# Patient Record
Sex: FEMALE | Employment: UNEMPLOYED | URBAN - METROPOLITAN AREA
[De-identification: names, ages, dates, MRNs, and addresses within clinical notes are randomized per-mention and may not be internally consistent; named-entity substitution may affect disease eponyms.]

---

## 2017-02-06 ENCOUNTER — PRE VISIT (OUTPATIENT)
Dept: MATERNAL FETAL MEDICINE | Facility: CLINIC | Age: 37
End: 2017-02-06

## 2017-02-08 ENCOUNTER — DOCUMENTATION ONLY (OUTPATIENT)
Dept: MATERNAL FETAL MEDICINE | Facility: CLINIC | Age: 37
End: 2017-02-08

## 2017-02-08 ENCOUNTER — OFFICE VISIT (OUTPATIENT)
Dept: MATERNAL FETAL MEDICINE | Facility: CLINIC | Age: 37
End: 2017-02-08
Attending: OBSTETRICS & GYNECOLOGY
Payer: COMMERCIAL

## 2017-02-08 DIAGNOSIS — Z31.69 ENCOUNTER FOR PRECONCEPTION CONSULTATION: ICD-10-CM

## 2017-02-08 NOTE — NURSING NOTE
Pt presents to [facility] for preconception consult due to PPROM previable delivery of twins, GDMA2, AMA. Pt very tearful today. Here with partner. States she is wanting to get pregnant again soon. Met with Dr. Nuñez and Dr. Ruelas. See consult note in epic for today's recommendations and discussion. Questions answered. No further follow up at [facility] at this time. Robinson stable. Janine Xiong RN

## 2017-02-08 NOTE — PROGRESS NOTES
Dear Dr. Quintero,    Thank you for your request for maternal fetal medicine consult for Ms. Major for a history of previable PPROM.     HPI:  Sylvain Major is a 36 year old  with a history of di di twin gestation and previable PPROM at 19w2d with subsequent expectant management until 21w5d. Her pregnancy was also notable for T2DM and AMA. When she initially present at 19w2d, her cervix was noted to be closed with anhydramnios of twin A. She elected for expectant management of PPROM, and was discharged home and at 21w5d began to have vaginal bleeding and pressure. She was admitted with a WBC of 18.2 and vaginal bleeding consistent with possible abruption. She progressed spontaneously to 8cm. She had an uncomplicated  at 21w5d of twin A. She desired interval and expectant management of twin B. Approximately 24 hours later, she experienced contractions, vaginal bleeding and twin B presented with a bulging bag of water. She then spontaneously delivered twin B. Both placentas delivered spontaneously without complication. Autopsy was not performed, but limited external examination of fetuses revealed no anomalies and weight/foot length consistent with gestational age. Placental pathology revealed chorioamnionitis and possible abruption.    Regarding her history of T2DM, she was diagnosed in 2015 while undergoing IVF stimulation and on glucocorticoids. Her A1C at that time was 6.7, meeting criteria for DM2 diagnosis. However, the patient denies a formal diagnosis of DM2 and has never been treated or formally followed up for this. A1C was repeated in pregnancy and was . Her last A1C was 6.3. She currently takes no diabetes medication. She denies any history of renal or cardiovascular disease but has not been screened.    Obstetric History       T0      TAB0   SAB0   E0   M0   L0       # Outcome Date GA Lbr Jose/2nd Weight Sex Delivery Anes PTL Lv   1  16 21w6d 05:13  / 00:24 0.4 kg (14.1 oz) M Vag-Bree[facility] IV [facility] Y ND      Name: FRANCYBABYPatricia JASMINE      Apgar1:  2                Apgar5: 1        GynHx:  - Denies h/o STI  - Denies abnormal pap smear or cervical procedure history    Patient Active Problem List   Diagnosis     CARDIOVASCULAR SCREENING; LDL GOAL LESS THAN 160      premature rupture of membranes (PPROM) delivered, current hospitalization     Insulin controlled gestational diabetes mellitus (GDM) in second trimester     Type 2 diabetes mellitus affecting pregnancy, antepartum     Dichorionic diamniotic twin gestation      premature rupture of membranes (PPROM) with unknown onset of labor     Oligohydramnios antepartum, second trimester, fetus 1     PROM (premature rupture of membranes)     No past surgical history on file.    Meds:  Prenatal vitamins    Allergies:   No Known Allergies    PFH:  - Noncontributory    SocHx:  - Denies tobacco, etoh and illicit drug use    Assessment and Plan:  Sylvain Major is a 36 year old  here for preconception counseling with history of PPROM at 19w2d. We discussed that the patient s history is not consistent with cervical insufficiency. She had no cervical dilation,  labor, or bulging membranes that would be consistent with cervical insufficiency at the time of presentation. At this time it is reasonable to consider close cervical length monitoring with serial transvaginal ultrasounds. We discussed that if the cervix did begin to shorten then an indicated cerclage could be placed. While vaginal progesterone and 17 OHP injections and prophylactic cerclage have not been studied in PPROM prior to 20 weeks, there is likely some element of  labor behind these events, and it would be reasonable to initiate 17 Hydroxyprogesterone Caproate injections at 16 weeks and weekly until 37 weeks. We reviewed that use of 17-OHP has been shown to reduce risk of recurrent delivery by 30  percent.    The patient did have questions regarding precautions, and many questions regarding what may have caused the PPROM and  delivery. We discussed that since there is no etiology to explain the 19w2d week PPROM other than twin gestation and possible chorioamnionitis, there are no specific precautions such as lifting restrictions or pelvic rest she should follow. The patient is pursuing single embryo transfer. We discussed that single embryo transfer does lower her risk of multiple gestation pregnancy, thereby reducing her risks of recurrent PPROM,  delivery, and other multiple pregnancy complications including hypertensive disorders, hemorrhage, hyperemesis, and  delivery. We do not suspect the presence of a subchorionic hematoma caused this. The patient inquired about the use of aspirin. It would be reasonable to initiate aspirin after 6 weeks gestation and continue 81 mg daily throughout pregnancy. Placental pathology revealed possible abruption and acute chorioamnionitis, however the infection and abruption most likely occurred secondary to the PPROM and were not likely the cause of these events.    We also discussed with the patient that pregestational diabetes mellitus is associated with an increased risk of miscarriage, stillbirth, macrosomia, and birth injury.  We discussed that a higher rate of birth defects is also present, and that the risk increases with increasing blood glucose. We discussed strict glucose control and frequent blood glucose monitoring. We discussed target glucose levels during pregnancy including fasting <90, 1 hour postprandial <140 and 2 hour postprandial <120. Due to the increased risk of birth defects, particularly cardiac defects, we recommend a level 2 ultrasound at 18-20 weeks and a fetal echocardiogram at 20-22 weeks. To evaluate growth, we recommend a comprehensive growth ultrasound every four weeks thereafter. We discussed that for mothers with type  2 diabetes, an increased risk for preeclampsia, gestational hypertension, and  delivery exist. We discussed the need for baseline laboratory evaluation including CBC with platelets, creatinine, and urine protein to creatinine ratio.  We would also recommend an ophthalmologic evaluation annually.  We would also recommend weekly  testing starting at 32 weeks gestation. She voices understanding and will obtain testing for diabetes prior to pursuing IVF stimulation and transfer.    Recommendations for pregnancy:  1) Serial transvaginal ultrasounds: 16w limited anatomy scan and transvaginal, 18w transvaginal, 20w comprehensive anatomy ultrasound and transvaginal ultrasound.   2) Optimize nutrition, consult with nutritionist recommended prior to conception and in early pregnancy  3) Recommend annual ophthalmologic examination to evaluate for presence of retinal vascular disease.  4) Obtain a baseline EKG in early pregnancy  5) Optimize blood glucose control prior to pregnancy. Establish and continue close follow up with endocrinology.   6) Baseline creatinine and urine protein:creatine ratio in early pregnancy  7) Imaging: Level two comprehensive ultrasound at 18-20 weeks, fetal echocardiography at 20-22 weeks, serial growth ultrasounds every 4 weeks  8)  surveillance with weekly BPP s starting at 32 weeks  9) 17-OHP injections starting at 16 weeks and continuing weekly until term  10) Reasonable to take 81 mg ASA daily after 6 weeks gestation     Thank you for the opportunity to participate in the care of this patient. If you have questions regarding today's evaluation or if we can be of further service, please contact the Maternal Fetal Medicine Clinic. Approximate face to face time was 45 minutes. The majority of time was spent on counseling and coordination of care of this patient.    VICENTEATTEMEKA    Ms. Major is a pleasant 36 year old female was seen today  in  consultation for   Patient Active Problem List   Diagnosis     CARDIOVASCULAR SCREENING; LDL GOAL LESS THAN 160      premature rupture of membranes (PPROM) delivered, current hospitalization     Insulin controlled gestational diabetes mellitus (GDM) in second trimester     Type 2 diabetes mellitus affecting pregnancy, antepartum     Dichorionic diamniotic twin gestation      premature rupture of membranes (PPROM) with unknown onset of labor     Oligohydramnios antepartum, second trimester, fetus 1     PROM (premature rupture of membranes)   .  I have reviewed the note and discussed the case with the resident.  I have personallyreviewed patient records and labs.  I agree with discussion and plan of care.    Chavez Nuñez M.D.

## 2017-03-17 ENCOUNTER — CARE COORDINATION (OUTPATIENT)
Dept: CASE MANAGEMENT | Facility: CLINIC | Age: 37
End: 2017-03-17

## 2017-03-24 ENCOUNTER — CARE COORDINATION (OUTPATIENT)
Dept: CASE MANAGEMENT | Facility: CLINIC | Age: 37
End: 2017-03-24

## 2017-08-01 ENCOUNTER — TRANSFERRED RECORDS (OUTPATIENT)
Dept: HEALTH INFORMATION MANAGEMENT | Facility: CLINIC | Age: 37
End: 2017-08-01

## 2017-08-01 LAB — PAP SMEAR - HIM PATIENT REPORTED: NEGATIVE

## 2017-09-13 ENCOUNTER — TRANSFERRED RECORDS (OUTPATIENT)
Dept: HEALTH INFORMATION MANAGEMENT | Facility: CLINIC | Age: 37
End: 2017-09-13

## 2017-09-13 LAB
ALT SERPL-CCNC: 59 IU/L (ref 12–68)
AST SERPL-CCNC: 28 IU/L (ref 12–37)
CHOLEST SERPL-MCNC: 260 MG/DL
CREAT SERPL-MCNC: 0.57 MG/DL (ref 0.55–1.02)
GFR SERPL CREATININE-BSD FRML MDRD: >60 ML/MIN/1.73M2
GLUCOSE SERPL-MCNC: 203 MG/DL (ref 74–106)
HBA1C MFR BLD: 9.1 % (ref 0–5.7)
HDLC SERPL-MCNC: 51 MG/DL
LDLC SERPL CALC-MCNC: 174 MG/DL
POTASSIUM SERPL-SCNC: 4 MMOL/L (ref 3.5–5.1)
TRIGL SERPL-MCNC: 177 MG/DL

## 2017-09-14 ENCOUNTER — TRANSFERRED RECORDS (OUTPATIENT)
Dept: HEALTH INFORMATION MANAGEMENT | Facility: CLINIC | Age: 37
End: 2017-09-14

## 2017-10-17 ENCOUNTER — OFFICE VISIT (OUTPATIENT)
Dept: FAMILY MEDICINE | Facility: CLINIC | Age: 37
End: 2017-10-17
Payer: COMMERCIAL

## 2017-10-17 VITALS
DIASTOLIC BLOOD PRESSURE: 82 MMHG | HEIGHT: 64 IN | BODY MASS INDEX: 25.1 KG/M2 | WEIGHT: 147 LBS | SYSTOLIC BLOOD PRESSURE: 125 MMHG | OXYGEN SATURATION: 98 % | TEMPERATURE: 97.7 F | HEART RATE: 48 BPM

## 2017-10-17 DIAGNOSIS — E78.5 HYPERLIPIDEMIA LDL GOAL <70: ICD-10-CM

## 2017-10-17 DIAGNOSIS — E11.9 TYPE 2 DIABETES MELLITUS WITHOUT COMPLICATION, WITHOUT LONG-TERM CURRENT USE OF INSULIN (H): Primary | ICD-10-CM

## 2017-10-17 DIAGNOSIS — R07.89 ATYPICAL CHEST PAIN: ICD-10-CM

## 2017-10-17 PROCEDURE — 99214 OFFICE O/P EST MOD 30 MIN: CPT | Performed by: INTERNAL MEDICINE

## 2017-10-17 PROCEDURE — 93000 ELECTROCARDIOGRAM COMPLETE: CPT | Performed by: INTERNAL MEDICINE

## 2017-10-17 RX ORDER — ATORVASTATIN CALCIUM 40 MG/1
40 TABLET, FILM COATED ORAL DAILY
Qty: 90 TABLET | Refills: 1 | Status: SHIPPED | OUTPATIENT
Start: 2017-10-17 | End: 2017-12-19

## 2017-10-17 RX ORDER — PRENATAL VIT/IRON FUM/FOLIC AC 27MG-0.8MG
1 TABLET ORAL DAILY
COMMUNITY
End: 2019-09-10

## 2017-10-17 NOTE — PROGRESS NOTES
SUBJECTIVE:   Sylvain Major is a 37 year old female who presents to clinic today for the following health issues:      Concern -  lab results   Onset: 2017      Description:   [facility] was sent to primary to go over results     Intensity:     Progression of Symptoms:      Accompanying Signs & Symptoms:      Previous history of similar problem:       Precipitating factors:   Worsened by:     Alleviating factors:  Improved by:     Therapies Tried and outcome:     Sylvain is a 38 y/o female with history of  premature rupture of membranes resulting in loss of dizygotic twins at 21 weeks gestation. She was found to have gestational diabetes and there was some concern that she may have had pregestational diabetes. She just had labs done recently which confirmed that she does have Diabetes - her A1c was 9.1%.     Concerned also about some chest pain she had in the last few days. Only lasted a few seconds and was substernal. Not related to exercise.     Had an eye exam within the last 12 months.     Problem list and histories reviewed & adjusted, as indicated.  Additional history: as documented    Patient Active Problem List   Diagnosis     CARDIOVASCULAR SCREENING; LDL GOAL LESS THAN 160      premature rupture of membranes (PPROM) delivered, current hospitalization     Insulin controlled gestational diabetes mellitus (GDM) in second trimester     Type 2 diabetes mellitus affecting pregnancy, antepartum     Dichorionic diamniotic twin gestation      premature rupture of membranes (PPROM) with unknown onset of labor     Oligohydramnios antepartum, second trimester, fetus 1     PROM (premature rupture of membranes)     No past surgical history on file.    Social History   Substance Use Topics     Smoking status: Never Smoker     Smokeless tobacco: Never Used     Alcohol use 0.0 oz/week     0 Standard drinks or equivalent per week      Comment: occ     Family  "History   Problem Relation Age of Onset     HEART DISEASE Mother      DIABETES Father      DIABETES Paternal Grandmother      DIABETES Paternal Grandfather              Reviewed and updated as needed this visit by clinical staff     Reviewed and updated as needed this visit by Provider         ROS:  Constitutional, HEENT, cardiovascular, pulmonary, gi and gu systems are negative, except as otherwise noted.      OBJECTIVE:   /82 (BP Location: Right arm, Cuff Size: Adult Regular)  Pulse (!) 48  Temp 97.7  F (36.5  C) (Oral)  Ht 5' 4\" (1.626 m)  Wt 147 lb (66.7 kg)  LMP 2017  SpO2 98%  BMI 25.23 kg/m2  Body mass index is 25.23 kg/(m^2).  GENERAL: healthy, alert and no distress  NECK: no adenopathy, no asymmetry, masses, or scars and thyroid normal to palpation  RESP: lungs clear to auscultation - no rales, rhonchi or wheezes  CV: regular rate and rhythm, normal S1 S2, no S3 or S4, no murmur, click or rub, no peripheral edema and peripheral pulses strong  PSYCH: mentation appears normal, affect normal/bright    Diagnostic Test Results:  ECG: Normal sinus rhythm, no ST changes    Review of outside labs:  Hemoglobin A1c 9.1%  TC: 260  LDL: 174  HDL: 51  T  ASSESSMENT/PLAN:     1. Type 2 diabetes mellitus without complication, without long-term current use of insulin (H)  38 y/o female with history of gestational diabetes now with Type 2 Diabetes. She is hoping to undergo in vitro fertilization in the future. Will plan to start her on Metformin and Lipitor today (though Lipitor would need to be discontinued prior to attempts at conception). Also recommending aspirin daily 81 mg.   - metFORMIN (GLUCOPHAGE) 500 MG tablet; Start taking 500 mg twice daily and after 2 weeks increase to 1000 mg twice daily.  Dispense: 120 tablet; Refill: 1  - DIABETES EDUCATOR REFERRAL  - atorvastatin (LIPITOR) 40 MG tablet; Take 1 tablet (40 mg) by mouth daily  Dispense: 90 tablet; Refill: 1  - aspirin 81 MG EC tablet; " Take 1 tablet (81 mg) by mouth daily  Dispense: 90 tablet; Refill: 3    2. Hyperlipidemia LDL goal <70  - atorvastatin (LIPITOR) 40 MG tablet; Take 1 tablet (40 mg) by mouth daily  Dispense: 90 tablet; Refill: 1    3. Atypical chest pain  Negative ECG. Unlikely cardiac.   - EKG 12-lead complete w/read - Clinics    Follow up in 2 month(s) or sooner if needed      Janine Barrett MD  [facility]

## 2017-10-17 NOTE — MR AVS SNAPSHOT
After Visit Summary   10/17/2017    Sylvain Major    MRN: [id number]           Patient Information     Date Of Birth          1980        Visit Information        Provider Department      10/17/2017 3:00 PM Janine Barrett MD [facility] [place]        Today's Diagnoses     Type 2 diabetes mellitus without complication, without long-term current use of insulin (H)    -  1    Hyperlipidemia LDL goal <70        Atypical chest pain           Follow-ups after your visit        Additional Services     DIABETES EDUCATOR REFERRAL       DIABETES SELF MANAGEMENT TRAINING (DSMT)      Your provider has referred you to Diabetes Education: FMG: Diabetes Education - All [facility] [phone]   [email or web address]     If an urgent visit is needed or A1C is above 12, Care Team to call the Diabetes  Education Team at [phone] or send an In Basket message to the Diabetes Education Pool (P DIAB ED-PATIENT CARE).    A  will call you to make your appointment. If it has been more than 3 business days since your referral was placed, please call the above phone number to schedule.    Type of training and number of hours: Previous Diagnosis: Follow-up DSMT - 2 hours.    Medicare covers: 10 hours of initial DSMT in 12 month period from the time of first visit, plus 2 hours of follow-up DSMT annually, and additional hours as requested for insulin training.    Diabetes Type: Type 2 - On Oral Medication             Diabetes Co-Morbidities: none               A1C Goal:  <7.0       A1C is: Lab Results       Component                Value               Date                       A1C                      6.3                 10/17/2016              Diabetes Education Topics: Comprehensive Knowledge Assessment and Instruction    Special Educational Needs Requiring Individual DSMT: None       MEDICAL NUTRITION THERAPY (MNT) for  Diabetes    Medical Nutrition Therapy with a Registered Dietitian can be provided in coordination with Diabetes Self-Management Training to assist in achieving optimal diabetes management.    MNT Type and Hours: Previous diagnosis: Annual follow-up MNT - 2 hours                       Medicare will cover: 3 hours initial MNT in 12 month period after first visit, plus 2 hours of follow-up MNT annually    Please be aware that coverage of these services is subject to the terms and limitations of your health insurance plan.  Call member services at your health plan to determine Diabetes Self-Management Training (Codes  &amp; ) and Medical Nutrition Therapy (Codes 22220 & 53218) benefits and ask which blood glucose monitor brands are covered by your plan.  Please bring the following with you to your appointment:    (1)  List of current medications   (2)  List of Blood Glucose Monitor brands that are covered by your insurance plan  (3)  Blood Glucose Monitor and log book  (4)   Food records for the 3 days prior to your visit    The Certified Diabetes Educator may make diabetes medication adjustments per the CDE Protocol and Collaborative Practice Agreement.                  Who to contact     If you have questions or need follow up information about today's clinic visit or your schedule please contact [facility] [place] directly at [phone].  Normal or non-critical lab and imaging results will be communicated to you by MyChart, letter or phone within 4 business days after the clinic has received the results. If you do not hear from us within 7 days, please contact the clinic through Instant[employer] or phone. If you have a critical or abnormal lab result, we will notify you by phone as soon as possible.  Submit refill requests through [employer] or call your pharmacy and they will forward the refill request to us. Please allow 3 business days for your refill to be completed.          Additional Information  "About Your Visit        [facility] Information     happin! lets you send messages to your doctor, view your test results, renew your prescriptions, schedule appointments and more. To sign up, go to [email or web address] . Click on \"Log in\" on the left side of the screen, which will take you to the Welcome page. Then click on \"Sign up Now\" on the right side of the page.     You will be asked to enter the access code listed below, as well as some personal information. Please follow the directions to create your username and password.     Your access code is: [email or web address]  Expires: 2018  9:52 AM     Your access code will  in 90 days. If you need help or a new code, please call your [facility] clinic or [phone].        Care EveryWhere ID     This is your Care EveryWhere ID. This could be used by other organizations to access your [facility] medical records  [id number]        Your Vitals Were     Pulse Temperature Height Last Period Pulse Oximetry BMI (Body Mass Index)    48 97.7  F (36.5  C) (Oral) 5' 4\" (1.626 m) 2017 98% 25.23 kg/m2       Blood Pressure from Last 3 Encounters:   10/17/17 125/82   16 97/61   10/25/16 110/68    Weight from Last 3 Encounters:   10/17/17 147 lb (66.7 kg)   10/25/16 153 lb (69.4 kg)   10/17/16 152 lb (68.9 kg)              We Performed the Following     DIABETES EDUCATOR REFERRAL     EKG 12-lead complete w/read - Clinics          Today's Medication Changes          These changes are accurate as of: 10/17/17 11:59 PM.  If you have any questions, ask your nurse or doctor.               Start taking these medicines.        Dose/Directions    atorvastatin 40 MG tablet   Commonly known as:  LIPITOR   Used for:  Type 2 diabetes mellitus without complication, without long-term current use of insulin (H), Hyperlipidemia LDL goal <70   Started by:  Janine Barrett MD        Dose:  40 mg   Take 1 tablet (40 mg) by mouth daily   Quantity:  90 tablet   Refills:  1       " metFORMIN 500 MG tablet   Commonly known as:  GLUCOPHAGE   Used for:  Type 2 diabetes mellitus without complication, without long-term current use of insulin (H)   Started by:  Janine Barrett MD        Start taking 500 mg twice daily and after 2 weeks increase to 1000 mg twice daily.   Quantity:  120 tablet   Refills:  1            Where to get your medicines      [facility] [address] [place] [address] AT [facility] [address], [facility] [address]    Hours:  24-hours Phone:  [phone]     atorvastatin 40 MG tablet    metFORMIN 500 MG tablet                Primary Care Provider    Physician No Ref-Primary       [address] [place] [address] DR  [place] [address]        Equal Access to Services     JOVON MURILLO AH: Hadii susy yang hadasho Sobreanna, waaxda luqadaha, qaybta kaalmada adeegyada, waxay citlaliin shashi manrique. So [facility] [phone].    ATENCIÓN: Si habla español, tiene a galvez disposición servicios gratuitos de asistencia lingüística. [facility] [phone].    We comply with applicable federal civil rights laws and [place] laws. We do not discriminate on the basis of race, color, national origin, age, disability, sex, sexual orientation, or gender identity.            Thank you!     Thank you for choosing [facility]  for your care. Our goal is always to provide you with excellent care. Hearing back from our patients is one way we can continue to improve our services. Please take a few minutes to complete the written survey that you may receive in the mail after your visit with us. Thank you!             Your Updated Medication List - Protect others around you: Learn how to safely use, store and throw away your medicines at www.disposemymeds.org.          This list is accurate as of: 10/17/17 11:59 PM.  Always use your most recent med list.                   Brand Name Dispense Instructions for use  Diagnosis    ACE/ARB NOT PRESCRIBED (INTENTIONAL)      ACE & ARB not prescribed due to Current Pregnancy        aspirin 81 MG EC tablet     90 tablet    Take 1 tablet (81 mg) by mouth daily    Type 2 diabetes mellitus without complication, without long-term current use of insulin (H)       atorvastatin 40 MG tablet    LIPITOR    90 tablet    Take 1 tablet (40 mg) by mouth daily    Type 2 diabetes mellitus without complication, without long-term current use of insulin (H), Hyperlipidemia LDL goal <70       FISH OIL PO           metFORMIN 500 MG tablet    GLUCOPHAGE    120 tablet    Start taking 500 mg twice daily and after 2 weeks increase to 1000 mg twice daily.    Type 2 diabetes mellitus without complication, without long-term current use of insulin (H)       prenatal multivitamin plus iron 27-0.8 MG Tabs per tablet      Take 1 tablet by mouth daily

## 2017-10-19 ENCOUNTER — TELEPHONE (OUTPATIENT)
Dept: FAMILY MEDICINE | Facility: CLINIC | Age: 37
End: 2017-10-19

## 2017-10-19 NOTE — TELEPHONE ENCOUNTER
Diabetes Education Scheduling Outreach #1:    Call to patient to schedule. Patient declined to schedule.        Alex Fischer  [facility]  Diabetes and Nutrition Scheduling

## 2017-10-20 PROBLEM — R07.89 ATYPICAL CHEST PAIN: Status: ACTIVE | Noted: 2017-10-20

## 2017-10-20 PROBLEM — E78.5 HYPERLIPIDEMIA LDL GOAL <70: Status: ACTIVE | Noted: 2017-10-20

## 2017-10-20 PROBLEM — E11.9 TYPE 2 DIABETES MELLITUS WITHOUT COMPLICATION, WITHOUT LONG-TERM CURRENT USE OF INSULIN (H): Status: ACTIVE | Noted: 2017-10-20

## 2017-11-22 ENCOUNTER — TELEPHONE (OUTPATIENT)
Dept: MATERNAL FETAL MEDICINE | Facility: CLINIC | Age: 37
End: 2017-11-22

## 2017-11-22 DIAGNOSIS — Z53.9 ERRONEOUS ENCOUNTER--DISREGARD: Primary | ICD-10-CM

## 2017-11-26 ENCOUNTER — HEALTH MAINTENANCE LETTER (OUTPATIENT)
Age: 37
End: 2017-11-26

## 2017-11-27 NOTE — TELEPHONE ENCOUNTER
Sylvain called regarding request for mfm consultation to be sent to her reproductive medicine clinic. I informed Sylvain that [facility] is faxing the MFM consultation to her reproductive medicine clinic. JUAN Tai at [facility] is faxing today.

## 2017-11-29 ENCOUNTER — TELEPHONE (OUTPATIENT)
Dept: MATERNAL FETAL MEDICINE | Facility: CLINIC | Age: 37
End: 2017-11-29

## 2017-11-29 NOTE — TELEPHONE ENCOUNTER
Spoke with Sylvain regarding her request for her MFM Consultation to be faxed to her IVF provider. I spoke with Abida at her primary MD office to verify they had received the consultation. Abida at primary MD office faxed MFM consult to Sylvain's IVF provider.

## 2017-12-11 ENCOUNTER — TELEPHONE (OUTPATIENT)
Dept: FAMILY MEDICINE | Facility: CLINIC | Age: 37
End: 2017-12-11

## 2017-12-11 DIAGNOSIS — E11.9 TYPE 2 DIABETES MELLITUS WITHOUT COMPLICATION, WITHOUT LONG-TERM CURRENT USE OF INSULIN (H): Primary | ICD-10-CM

## 2017-12-11 NOTE — TELEPHONE ENCOUNTER
Reason for Call: Request for an order or referral:    Order or referral being requested: LIPID/SUGAR Labs     Date needed: as soon as possible    Has the patient been seen by the PCP for this problem? YES    Additional comments: Pt was told at [facility] in October to return for follow up lab work in two months.     Phone number Patient can be reached at:  Home number on file [phone] (home)    Best Time:  Anytime     Can we leave a detailed message on this number?  YES    Call taken on 12/11/2017 at 2:55 PM by Viviana Heard

## 2017-12-12 NOTE — TELEPHONE ENCOUNTER
patient has appointment scheduled.    Ny Colunga RN  [facility]  [phone]

## 2017-12-12 NOTE — TELEPHONE ENCOUNTER
Orders placed. I would like patient to schedule a lab appointment and then a follow up with me to discuss results and review how things are going on her new medications.

## 2017-12-15 DIAGNOSIS — E11.9 TYPE 2 DIABETES MELLITUS WITHOUT COMPLICATION, WITHOUT LONG-TERM CURRENT USE OF INSULIN (H): ICD-10-CM

## 2017-12-15 LAB — HBA1C MFR BLD: 6.9 % (ref 4.3–6)

## 2017-12-15 PROCEDURE — 36415 COLL VENOUS BLD VENIPUNCTURE: CPT | Performed by: INTERNAL MEDICINE

## 2017-12-15 PROCEDURE — 80053 COMPREHEN METABOLIC PANEL: CPT | Performed by: INTERNAL MEDICINE

## 2017-12-15 PROCEDURE — 80061 LIPID PANEL: CPT | Performed by: INTERNAL MEDICINE

## 2017-12-15 PROCEDURE — 83036 HEMOGLOBIN GLYCOSYLATED A1C: CPT | Performed by: INTERNAL MEDICINE

## 2017-12-16 LAB
ALBUMIN SERPL-MCNC: 3.9 G/DL (ref 3.4–5)
ALP SERPL-CCNC: 68 U/L (ref 40–150)
ALT SERPL W P-5'-P-CCNC: 24 U/L (ref 0–50)
ANION GAP SERPL CALCULATED.3IONS-SCNC: 11 MMOL/L (ref 3–14)
AST SERPL W P-5'-P-CCNC: 14 U/L (ref 0–45)
BILIRUB SERPL-MCNC: 0.4 MG/DL (ref 0.2–1.3)
BUN SERPL-MCNC: 13 MG/DL (ref 7–30)
CALCIUM SERPL-MCNC: 8.6 MG/DL (ref 8.5–10.1)
CHLORIDE SERPL-SCNC: 107 MMOL/L (ref 94–109)
CHOLEST SERPL-MCNC: 108 MG/DL
CO2 SERPL-SCNC: 20 MMOL/L (ref 20–32)
CREAT SERPL-MCNC: 0.47 MG/DL (ref 0.52–1.04)
GFR SERPL CREATININE-BSD FRML MDRD: >90 ML/MIN/1.7M2
GLUCOSE SERPL-MCNC: 118 MG/DL (ref 70–99)
HDLC SERPL-MCNC: 50 MG/DL
LDLC SERPL CALC-MCNC: 38 MG/DL
NONHDLC SERPL-MCNC: 58 MG/DL
POTASSIUM SERPL-SCNC: 4 MMOL/L (ref 3.4–5.3)
PROT SERPL-MCNC: 7.5 G/DL (ref 6.8–8.8)
SODIUM SERPL-SCNC: 138 MMOL/L (ref 133–144)
TRIGL SERPL-MCNC: 99 MG/DL

## 2017-12-19 ENCOUNTER — OFFICE VISIT (OUTPATIENT)
Dept: FAMILY MEDICINE | Facility: CLINIC | Age: 37
End: 2017-12-19
Payer: COMMERCIAL

## 2017-12-19 VITALS
WEIGHT: 145 LBS | HEART RATE: 108 BPM | SYSTOLIC BLOOD PRESSURE: 110 MMHG | DIASTOLIC BLOOD PRESSURE: 70 MMHG | BODY MASS INDEX: 24.89 KG/M2 | TEMPERATURE: 97.1 F

## 2017-12-19 DIAGNOSIS — E78.5 HYPERLIPIDEMIA LDL GOAL <70: ICD-10-CM

## 2017-12-19 DIAGNOSIS — E11.9 TYPE 2 DIABETES MELLITUS WITHOUT COMPLICATION, WITHOUT LONG-TERM CURRENT USE OF INSULIN (H): Primary | ICD-10-CM

## 2017-12-19 PROCEDURE — 99214 OFFICE O/P EST MOD 30 MIN: CPT | Performed by: INTERNAL MEDICINE

## 2017-12-19 RX ORDER — ATORVASTATIN CALCIUM 40 MG/1
40 TABLET, FILM COATED ORAL DAILY
Qty: 90 TABLET | Refills: 1 | Status: SHIPPED | OUTPATIENT
Start: 2017-12-19 | End: 2018-04-13

## 2017-12-19 RX ORDER — DESOGESTREL AND ETHINYL ESTRADIOL 0.15-0.03
1 KIT ORAL DAILY
Refills: 3 | COMMUNITY
Start: 2017-12-01 | End: 2018-04-13

## 2017-12-19 NOTE — PROGRESS NOTES
SUBJECTIVE:   Sylvain Major is a 37 year old female who presents to clinic today for the following health issues:      Diabetes Follow-up - discuss lab results       Patient is checking blood sugars: once times a week, AM fasting readings 130-150s     Diabetic concerns: None     Symptoms of hypoglycemia (low blood sugar): none     Paresthesias (numbness or burning in feet) or sores: No     Date of last diabetic eye exam: 2016    BP Readings from Last 2 Encounters:   17 110/70   10/17/17 125/82     Hemoglobin A1C (%)   Date Value   12/15/2017 6.9 (H)   2017 9.1 (H)     LDL Cholesterol Calculated (mg/dL)   Date Value   12/15/2017 38   2017 174 (H)         Amount of exercise or physical activity: None    Problems taking medications regularly: No    Medication side effects: none    Patient is hoping to start in-vitro fertilization but her physician won't start until her Diabetes and cholesterol is under better control. When I saw her last I put her on Metformin and Atorvastatin. Her A1c in September had been 9.1% and LDL cholesterol had been 174 mg/dL          Problem list and histories reviewed & adjusted, as indicated.  Additional history: as documented    Patient Active Problem List   Diagnosis     CARDIOVASCULAR SCREENING; LDL GOAL LESS THAN 160      premature rupture of membranes (PPROM) delivered, current hospitalization     Insulin controlled gestational diabetes mellitus (GDM) in second trimester     Type 2 diabetes mellitus affecting pregnancy, antepartum     Dichorionic diamniotic twin gestation      premature rupture of membranes (PPROM) with unknown onset of labor     Oligohydramnios antepartum, second trimester, fetus 1     PROM (premature rupture of membranes)     Type 2 diabetes mellitus without complication, without long-term current use of insulin (H)     Hyperlipidemia LDL goal <70     Atypical chest pain     No past surgical history on file.    Social  History   Substance Use Topics     Smoking status: Never Smoker     Smokeless tobacco: Never Used     Alcohol use 0.0 oz/week     0 Standard drinks or equivalent per week      Comment: occ     Family History   Problem Relation Age of Onset     HEART DISEASE Mother      DIABETES Father      DIABETES Paternal Grandmother      DIABETES Paternal Grandfather              Reviewed and updated as needed this visit by clinical staffTobacco  Allergies  Meds       Reviewed and updated as needed this visit by Provider         ROS:  Constitutional, HEENT, cardiovascular, pulmonary, gi and gu systems are negative, except as otherwise noted.      OBJECTIVE:   /70  Pulse 108  Temp 97.1  F (36.2  C) (Tympanic)  Wt 145 lb (65.8 kg)  LMP 11/30/2017 (Exact Date)  BMI 24.89 kg/m2  Body mass index is 24.89 kg/(m^2).  GENERAL: healthy, alert and no distress  RESP: lungs clear to auscultation - no rales, rhonchi or wheezes  CV: regular rate and rhythm, normal S1 S2, no S3 or S4, no murmur, click or rub, no peripheral edema and peripheral pulses strong  PSYCH: mentation appears normal, affect normal/bright    Diagnostic Test Results:  Hemoglobin A1c 6.9%  LDL 38    ASSESSMENT/PLAN:       1. Type 2 diabetes mellitus without complication, without long-term current use of insulin (H)  Doing well on Metformin 500 mg BID. Will continue at this dose for now. Will recheck A1c in 3 months.   - metFORMIN (GLUCOPHAGE) 500 MG tablet; Start taking 500 mg twice daily and after 2 weeks increase to 1000 mg twice daily.  Dispense: 120 tablet; Refill: 1  - atorvastatin (LIPITOR) 40 MG tablet; Take 1 tablet (40 mg) by mouth daily  Dispense: 90 tablet; Refill: 1    2. Hyperlipidemia LDL goal <70  Continuing statin for now but patient understands that she will need to stop this when she attempts IVF.   - atorvastatin (LIPITOR) 40 MG tablet; Take 1 tablet (40 mg) by mouth daily  Dispense: 90 tablet; Refill: 1    Follow up in 3 month(s) or sooner  if needed      Janine Barrett MD  [facility] [place]

## 2017-12-19 NOTE — MR AVS SNAPSHOT
"              After Visit Summary   2017    Sylvain Major    MRN: [id number]           Patient Information     Date Of Birth          1980        Visit Information        Provider Department      2017 4:20 PM Janine Barrett MD [facility] [place]        Today's Diagnoses     Type 2 diabetes mellitus without complication, without long-term current use of insulin (H)    -  1    Hyperlipidemia LDL goal <70           Follow-ups after your visit        Who to contact     If you have questions or need follow up information about today's clinic visit or your schedule please contact [facility] [place] directly at [phone].  Normal or non-critical lab and imaging results will be communicated to you by MyChart, letter or phone within 4 business days after the clinic has received the results. If you do not hear from us within 7 days, please contact the clinic through MyChart or phone. If you have a critical or abnormal lab result, we will notify you by phone as soon as possible.  Submit refill requests through linkedFA or call your pharmacy and they will forward the refill request to us. Please allow 3 business days for your refill to be completed.          Additional Information About Your Visit        MyChart Information     linkedFA lets you send messages to your doctor, view your test results, renew your prescriptions, schedule appointments and more. To sign up, go to [email or web address] . Click on \"Log in\" on the left side of the screen, which will take you to the Welcome page. Then click on \"Sign up Now\" on the right side of the page.     You will be asked to enter the access code listed below, as well as some personal information. Please follow the directions to create your username and password.     Your access code is: [email or web address]  Expires: 2018  8:52 AM     Your access code will  in 90 days. If you need help or a new code, please call your [place] " clinic or [phone].        Care EveryWhere ID     This is your Care EveryWhere ID. This could be used by other organizations to access your [place] medical records  [id number]        Your Vitals Were     Pulse Temperature Last Period BMI (Body Mass Index)          108 97.1  F (36.2  C) (Tympanic) 11/30/2017 (Exact Date) 24.89 kg/m2         Blood Pressure from Last 3 Encounters:   12/19/17 110/70   10/17/17 125/82   11/03/16 97/61    Weight from Last 3 Encounters:   12/19/17 145 lb (65.8 kg)   10/17/17 147 lb (66.7 kg)   10/25/16 153 lb (69.4 kg)              Today, you had the following     No orders found for display         Where to get your medicines      These medications were sent to [facility]/pharmacy #[id number] - [place], [place] - [address]  [address] [place] [address]     Phone:  [phone]     atorvastatin 40 MG tablet    metFORMIN 500 MG tablet          Primary Care Provider Office Phone # Fax #    Janine Barrett [phone]       [address]  [place] [address]        Equal Access to Services     ELMA MURILLO : Hadii susy ku hadasho Soomaali, waaxda luqadaha, qaybta kaalmada adeegyada, waxay citlaliin hayhelion donna manrique. So [facility] [phone].    ATENCIÓN: Si habla español, tiene a galvez disposición servicios gratuitos de asistencia lingüística. Llame al [phone].    We comply with applicable federal civil rights laws and [place] laws. We do not discriminate on the basis of race, color, national origin, age, disability, sex, sexual orientation, or gender identity.            Thank you!     Thank you for choosing [facility] [place]  for your care. Our goal is always to provide you with excellent care. Hearing back from our patients is one way we can continue to improve our services. Please take a few minutes to complete the written survey that you may receive in the mail after your visit with us. Thank you!             Your Updated  Medication List - Protect others around you: Learn how to safely use, store and throw away your medicines at www.disposemymeds.org.          This list is accurate as of: 12/19/17  8:32 PM.  Always use your most recent med list.                   Brand Name Dispense Instructions for use Diagnosis    ACE/ARB/ARNI NOT PRESCRIBED (INTENTIONAL)      ACE & ARB not prescribed due to Current Pregnancy        APRI 0.15-30 MG-MCG per tablet   Generic drug:  desogestrel-ethinyl estradiol      1 tablet daily        aspirin 81 MG EC tablet     90 tablet    Take 1 tablet (81 mg) by mouth daily    Type 2 diabetes mellitus without complication, without long-term current use of insulin (H)       atorvastatin 40 MG tablet    LIPITOR    90 tablet    Take 1 tablet (40 mg) by mouth daily    Type 2 diabetes mellitus without complication, without long-term current use of insulin (H), Hyperlipidemia LDL goal <70       FISH OIL PO           metFORMIN 500 MG tablet    GLUCOPHAGE    120 tablet    Start taking 500 mg twice daily and after 2 weeks increase to 1000 mg twice daily.    Type 2 diabetes mellitus without complication, without long-term current use of insulin (H)       prenatal multivitamin plus iron 27-0.8 MG Tabs per tablet      Take 1 tablet by mouth daily

## 2017-12-19 NOTE — NURSING NOTE
"Chief Complaint   Patient presents with     Diabetes     discuss lab results        Initial /70  Pulse 108  Temp 97.1  F (36.2  C) (Tympanic)  Wt 145 lb (65.8 kg)  LMP 11/30/2017 (Exact Date)  BMI 24.89 kg/m2 Estimated body mass index is 24.89 kg/(m^2) as calculated from the following:    Height as of 10/17/17: 5' 4\" (1.626 m).    Weight as of this encounter: 145 lb (65.8 kg).  Medication Reconciliation: complete    Current Outpatient Prescriptions   Medication Sig Dispense Refill     APRI 0.15-30 MG-MCG per tablet 1 tablet daily  3     Omega-3 Fatty Acids (FISH OIL PO)        Prenatal Vit-Fe Fumarate-FA (PRENATAL MULTIVITAMIN PLUS IRON) 27-0.8 MG TABS per tablet Take 1 tablet by mouth daily       metFORMIN (GLUCOPHAGE) 500 MG tablet Start taking 500 mg twice daily and after 2 weeks increase to 1000 mg twice daily. 120 tablet 1     atorvastatin (LIPITOR) 40 MG tablet Take 1 tablet (40 mg) by mouth daily 90 tablet 1     aspirin 81 MG EC tablet Take 1 tablet (81 mg) by mouth daily 90 tablet 3     ACE/ARB NOT PRESCRIBED, INTENTIONAL, ACE & ARB not prescribed due to Current Pregnancy (Patient not taking: Reported on 10/17/2017)         Dl WOLFF CMA  "

## 2017-12-19 NOTE — Clinical Note
Please abstract the following data from this visit with this patient into the appropriate field in Epic:  Eye exam with ophthalmology on this date: [facility] 12/2016

## 2017-12-19 NOTE — PROGRESS NOTES
"  SUBJECTIVE:   Sylvain Major is a 37 year old female who presents to clinic today for the following health issues:      Concern - Pt is here to discuss the results from her labs    {additional problems for provider to add:104330}    Problem list and histories reviewed & adjusted, as indicated.  Additional history: {NONE - AS DOCUMENTED:679888::\"as documented\"}    {HIST REVIEW/ LINKS 2:052579}    Reviewed and updated as needed this visit by clinical staff     Reviewed and updated as needed this visit by Provider         {PROVIDER CHARTING PREFERENCE:468952}  "

## 2017-12-19 NOTE — LETTER
[facility]          [address] [place] [address]                            [phone]  Fax: [phone]    Sylvain Major  [address] [address]    [id number]    December 19, 2017      To whom it may concern    Sylvain Major is a patient under my professional care. I am treating her for Diabetes and hyperlipidemia. She has responded very well to her medications (Metformin and Atorvastatin). I've attached her lab results for your review.       If you have any other questions or concerns please feel free to contact me at anytime.        Sincerely,        Angella Barrett MD

## 2017-12-19 NOTE — Clinical Note
Please abstract the following data from this visit with this patient into the appropriate field in Epic:  Pap smear done on this date: Aug 2017 (approximately), by this group: [facility], results were normal.

## 2017-12-21 ENCOUNTER — TELEPHONE (OUTPATIENT)
Dept: FAMILY MEDICINE | Facility: CLINIC | Age: 37
End: 2017-12-21

## 2017-12-21 DIAGNOSIS — E11.9 TYPE 2 DIABETES MELLITUS WITHOUT COMPLICATION, WITHOUT LONG-TERM CURRENT USE OF INSULIN (H): ICD-10-CM

## 2017-12-21 NOTE — TELEPHONE ENCOUNTER
Patient requesting 90 day supply. It appears sig needs to be changed as well. Changes made to pended med, please review and order if appropriate.   Orly Cho RN   [facility] - Triage

## 2017-12-21 NOTE — TELEPHONE ENCOUNTER
Patient takes metformin 500 mg (2 tab = 1000mg) BID.  Will need 360 tabs per 90 days supply.    Last sent for only 180 tabs as below -  New Rx sent for 360 tab as requested    metFORMIN (GLUCOPHAGE) 500 MG tablet 180 tablet 1 12/21/2017  No   Sig: Take 2 tablets (1,000 mg) by mouth 2 times daily (with meals)       Kelly Simpson RN

## 2017-12-21 NOTE — TELEPHONE ENCOUNTER
Reason for Call:  Medication or medication refill:    Do you use a [facility]?  Name of the pharmacy and phone number for the current request:  [facility] [address] - [phone]    Name of the medication requested: [facility] [place] called. want Dr Barrett to give prior authorization for medication metFORMIN (GLUCOPHAGE) 500 MG tablet. insurance require 3 months authorizations.pt is out of medication as of 12/21/2017.    Other request: na    Can we leave a detailed message on this number? NO    Phone number patient can be reached at: Other phone number: [phone]    Best Time: any    Call taken on 12/21/2017 at 11:02 AM by Jerome Pierre

## 2018-02-12 ENCOUNTER — OFFICE VISIT (OUTPATIENT)
Dept: FAMILY MEDICINE | Facility: CLINIC | Age: 38
End: 2018-02-12
Payer: COMMERCIAL

## 2018-02-12 VITALS
HEIGHT: 64 IN | SYSTOLIC BLOOD PRESSURE: 112 MMHG | TEMPERATURE: 98.4 F | WEIGHT: 142 LBS | DIASTOLIC BLOOD PRESSURE: 84 MMHG | OXYGEN SATURATION: 98 % | BODY MASS INDEX: 24.24 KG/M2 | HEART RATE: 104 BPM

## 2018-02-12 DIAGNOSIS — G43.909 MIGRAINE WITHOUT STATUS MIGRAINOSUS, NOT INTRACTABLE, UNSPECIFIED MIGRAINE TYPE: ICD-10-CM

## 2018-02-12 DIAGNOSIS — R52 BODY ACHES: Primary | ICD-10-CM

## 2018-02-12 LAB
FLUAV+FLUBV AG SPEC QL: NEGATIVE
FLUAV+FLUBV AG SPEC QL: NEGATIVE
SPECIMEN SOURCE: NORMAL

## 2018-02-12 PROCEDURE — 99213 OFFICE O/P EST LOW 20 MIN: CPT | Performed by: FAMILY MEDICINE

## 2018-02-12 PROCEDURE — 87804 INFLUENZA ASSAY W/OPTIC: CPT | Performed by: FAMILY MEDICINE

## 2018-02-12 RX ORDER — SUMATRIPTAN 25 MG/1
25-50 TABLET, FILM COATED ORAL
Qty: 18 TABLET | Refills: 1 | Status: SHIPPED | OUTPATIENT
Start: 2018-02-12 | End: 2018-11-14

## 2018-02-12 RX ORDER — CHOLECALCIFEROL (VITAMIN D3) 50 MCG
2000 TABLET ORAL
COMMUNITY
End: 2019-12-04

## 2018-02-12 NOTE — MR AVS SNAPSHOT
"              After Visit Summary   2/12/2018    Sylvain Major    MRN: [id number]           Patient Information     Date Of Birth          1980        Visit Information        Provider Department      2/12/2018 6:20 PM Moody Kaur, DO [facility]        Today's Diagnoses     Body aches    -  1    Migraine without status migrainosus, not intractable, unspecified migraine type        Viral syndrome           Follow-ups after your visit        Follow-up notes from your care team     Return if symptoms worsen or fail to improve.      Your next 10 appointments already scheduled     Feb 27, 2018  1:30 PM CST   (Arrive by 1:15 PM)   NEW DIABETES with Edilma Rodriguez MD   [facility] ([facility])    [address] [place] [address]  [address] [facility] [address] [phone]              Who to contact     If you have questions or need follow up information about today's clinic visit or your schedule please contact [facility] [place] directly at [phone].  Normal or non-critical lab and imaging results will be communicated to you by Textinglyhart, letter or phone within 4 business days after the clinic has received the results. If you do not hear from us within 7 days, please contact the clinic through Textinglyhart or phone. If you have a critical or abnormal lab result, we will notify you by phone as soon as possible.  Submit refill requests through Arlettie or call your pharmacy and they will forward the refill request to us. Please allow 3 business days for your refill to be completed.          Additional Information About Your Visit        Textinglyhart Information     Arlettie lets you send messages to your doctor, view your test results, renew your prescriptions, schedule appointments and more. To sign up, go to [email or web address] . Click on \"Log in\" on the left side of the screen, which will take you to the Welcome page. Then click on \"Sign up Now\" on the right " "side of the page.     You will be asked to enter the access code listed below, as well as some personal information. Please follow the directions to create your username and password.     Your access code is: [email or web address]  Expires: 2018  2:34 PM     Your access code will  in 90 days. If you need help or a new code, please call your [facility] or [phone].        Care EveryWhere ID     This is your Care EveryWhere ID. This could be used by other organizations to access your [place] medical records  [id number]        Your Vitals Were     Pulse Temperature Height Pulse Oximetry BMI (Body Mass Index)       104 98.4  F (36.9  C) (Oral) 5' 4\" (1.626 m) 98% 24.37 kg/m2        Blood Pressure from Last 3 Encounters:   18 112/84   17 110/70   10/17/17 125/82    Weight from Last 3 Encounters:   18 142 lb (64.4 kg)   17 145 lb (65.8 kg)   10/17/17 147 lb (66.7 kg)              We Performed the Following     Influenza A/B antigen          Today's Medication Changes          These changes are accurate as of 18  7:16 PM.  If you have any questions, ask your nurse or doctor.               Start taking these medicines.        Dose/Directions    SUMAtriptan 25 MG tablet   Commonly known as:  IMITREX   Used for:  Migraine without status migrainosus, not intractable, unspecified migraine type, Viral syndrome   Started by:  Moody Kaur,         Dose:  25-50 mg   Take 1-2 tablets (25-50 mg) by mouth at onset of headache for migraine May repeat in 2 hours. Max 8 tablets/24 hours.   Quantity:  18 tablet   Refills:  1            Where to get your medicines      These medications were sent to [facility]/pharmacy #[id number] - [place] - [address]  [address], [place] [address]     Phone:  [phone]     SUMAtriptan 25 MG tablet                Primary Care Provider Office Phone # Fax #    Janine Barrett [phone]       [address] [place] [address]        Equal Access to Services     [facility] : Hadii susy yang kyalhmemo Kiaali, wathierryda luqadaha, qaybta kaalmaalina bernal. So [facility] [phone].    ATENCIÓN: Si habla español, tiene a galvez disposición servicios gratuitos de asistencia lingüística. Jerardoame al [phone].    We comply with applicable federal civil rights laws and [place] laws. We do not discriminate on the basis of race, color, national origin, age, disability, sex, sexual orientation, or gender identity.            Thank you!     Thank you for choosing [facility] [place]  for your care. Our goal is always to provide you with excellent care. Hearing back from our patients is one way we can continue to improve our services. Please take a few minutes to complete the written survey that you may receive in the mail after your visit with us. Thank you!             Your Updated Medication List - Protect others around you: Learn how to safely use, store and throw away your medicines at www.disposemymeds.org.          This list is accurate as of 2/12/18  7:16 PM.  Always use your most recent med list.                   Brand Name Dispense Instructions for use Diagnosis    ACE/ARB/ARNI NOT PRESCRIBED (INTENTIONAL)      ACE & ARB not prescribed due to Current Pregnancy        APRI 0.15-30 MG-MCG per tablet   Generic drug:  desogestrel-ethinyl estradiol      1 tablet daily        aspirin 81 MG EC tablet     90 tablet    Take 1 tablet (81 mg) by mouth daily    Type 2 diabetes mellitus without complication, without long-term current use of insulin (H)       atorvastatin 40 MG tablet    LIPITOR    90 tablet    Take 1 tablet (40 mg) by mouth daily    Type 2 diabetes mellitus without complication, without long-term current use of insulin (H), Hyperlipidemia LDL goal <70       FISH OIL PO           metFORMIN 500 MG tablet    GLUCOPHAGE    360 tablet    Take 2 tablets (1,000 mg) by mouth 2 times daily  (with meals)    Type 2 diabetes mellitus without complication, without long-term current use of insulin (H)       prenatal multivitamin plus iron 27-0.8 MG Tabs per tablet      Take 1 tablet by mouth daily        SUMAtriptan 25 MG tablet    IMITREX    18 tablet    Take 1-2 tablets (25-50 mg) by mouth at onset of headache for migraine May repeat in 2 hours. Max 8 tablets/24 hours.    Migraine without status migrainosus, not intractable, unspecified migraine type, Viral syndrome       vitamin D 2000 UNITS tablet      Take 2,000 Units by mouth

## 2018-02-13 NOTE — PROGRESS NOTES
SUBJECTIVE:   Sylvain Major is a 37 year old female who presents to clinic today for the following health issues:      Acute Illness   Acute illness concerns: headache/Body aches   Onset: X4 Days     Fever: YES- 99    Chills/Sweats: YES, both - not terrible though    Headache (location?): YES- migraine on Friday , right side of forehead and around eyes which got worse last night      Sinus Pressure:no    Conjunctivitis:  no    Ear Pain: no    Rhinorrhea: no     Congestion: no     Sore Throat: no      Cough: no    Wheeze: no    Decreased Appetite: YES- no taste, but drinking fluids okay.    Nausea: YES    Vomiting: no    Diarrhea:  no    Dysuria/Freq.: no    Fatigue/Achiness: YES- body ache and shoulder pain     Sick/Strep Exposure: no     Therapies Tried and outcome: Excedrin,advil - slight relief not helping headache        On Friday, she states she had a right-sided headache surrounding her right eye.  She thought it was sinus pressure or a migraine.  She tried taking Excedrin Migraine over the weekend without significant relief she also tried adding Advil without any difference.  This morning had body aches along with her headache.  She states that with typical migraines will have an aura.  She has not had this with this current headache.    Problem list and histories reviewed & adjusted, as indicated.  Additional history: as documented    Patient Active Problem List   Diagnosis     CARDIOVASCULAR SCREENING; LDL GOAL LESS THAN 160      premature rupture of membranes (PPROM) delivered, current hospitalization     Insulin controlled gestational diabetes mellitus (GDM) in second trimester     Type 2 diabetes mellitus affecting pregnancy, antepartum     Dichorionic diamniotic twin gestation      premature rupture of membranes (PPROM) with unknown onset of labor     Oligohydramnios antepartum, second trimester, fetus 1     PROM (premature rupture of membranes)     Type 2 diabetes mellitus  "without complication, without long-term current use of insulin (H)     Hyperlipidemia LDL goal <70     Atypical chest pain     History reviewed. No pertinent surgical history.    Social History   Substance Use Topics     Smoking status: Never Smoker     Smokeless tobacco: Never Used     Alcohol use 0.0 oz/week     0 Standard drinks or equivalent per week      Comment: occ     Family History   Problem Relation Age of Onset     HEART DISEASE Mother      DIABETES Father      DIABETES Paternal Grandmother      DIABETES Paternal Grandfather            Reviewed and updated as needed this visit by clinical staff  Tobacco  Allergies  Meds  Problems  Med Hx  Surg Hx  Fam Hx  Soc Hx        Reviewed and updated as needed this visit by Provider  Allergies  Meds  Problems         ROS:  Constitutional, HEENT, cardiovascular, pulmonary, gi and gu systems are negative, except as otherwise noted.    OBJECTIVE:     /84  Pulse 104  Temp 98.4  F (36.9  C) (Oral)  Ht 5' 4\" (1.626 m)  Wt 142 lb (64.4 kg)  SpO2 98%  BMI 24.37 kg/m2  Body mass index is 24.37 kg/(m^2).  GENERAL: healthy, alert and no distress  EYES: Eyes grossly normal to inspection, PERRL and conjunctivae and sclerae normal  HENT: ear canals and TM's normal, nose and mouth without ulcers or lesions  NECK: no adenopathy and no asymmetry, masses, or scars  RESP: lungs clear to auscultation - no rales, rhonchi or wheezes  CV: regular rate and rhythm, normal S1 S2, no S3 or S4, no murmur, click or rub, no peripheral edema and peripheral pulses strong  ABDOMEN: soft, nontender, no hepatosplenomegaly, no masses and bowel sounds normal  MS: no gross musculoskeletal defects noted, no edema  NEURO: Normal strength and tone, sensory exam grossly normal, mentation intact, oriented times 3, speech normal and cranial nerves 2-12 intact    Diagnostic Test Results:  Influenza Ag - negative    ASSESSMENT/PLAN:   1. Body aches: negative flu testing.  - Influenza A/B " antigen    2. Migraine without status migrainosus, not intractable, unspecified migraine type: Unilateral headache with accompanying nausea.  She does have history of migraines.  Will try treating with sumatriptan.  Discussed drinking plenty fluids and getting rest.  If symptoms not helped with this regimen, return to clinic for further evaluation.  - SUMAtriptan (IMITREX) 25 MG tablet; Take 1-2 tablets (25-50 mg) by mouth at onset of headache for migraine May repeat in 2 hours. Max 8 tablets/24 hours.  Dispense: 18 tablet; Refill: 1    Moody Kaur DO  [facility]

## 2018-02-13 NOTE — NURSING NOTE
"Chief Complaint   Patient presents with     Headache       Initial /84  Pulse 104  Temp 98.4  F (36.9  C) (Oral)  Ht 5' 4\" (1.626 m)  Wt 142 lb (64.4 kg)  SpO2 98%  BMI 24.37 kg/m2 Estimated body mass index is 24.37 kg/(m^2) as calculated from the following:    Height as of this encounter: 5' 4\" (1.626 m).    Weight as of this encounter: 142 lb (64.4 kg).  Medication Reconciliation: complete   Nathaly Torres Certified Medical Assistant    "

## 2018-02-27 ENCOUNTER — OFFICE VISIT (OUTPATIENT)
Dept: ENDOCRINOLOGY | Facility: CLINIC | Age: 38
End: 2018-02-27
Payer: COMMERCIAL

## 2018-02-27 VITALS
HEIGHT: 62 IN | DIASTOLIC BLOOD PRESSURE: 83 MMHG | HEART RATE: 102 BPM | SYSTOLIC BLOOD PRESSURE: 128 MMHG | BODY MASS INDEX: 25.86 KG/M2 | WEIGHT: 140.5 LBS

## 2018-02-27 DIAGNOSIS — Z13.29 SCREENING FOR THYROID DISORDER: ICD-10-CM

## 2018-02-27 DIAGNOSIS — E28.2 PCOS (POLYCYSTIC OVARIAN SYNDROME): ICD-10-CM

## 2018-02-27 DIAGNOSIS — E11.9 TYPE 2 DIABETES MELLITUS WITHOUT COMPLICATION, WITHOUT LONG-TERM CURRENT USE OF INSULIN (H): Primary | ICD-10-CM

## 2018-02-27 LAB — HBA1C MFR BLD: 6.1 % (ref 4.3–6)

## 2018-02-27 RX ORDER — GLUCOSAMINE HCL/CHONDROITIN SU 500-400 MG
1 CAPSULE ORAL DAILY
Qty: 100 EACH | Refills: 3 | Status: SHIPPED | OUTPATIENT
Start: 2018-02-27 | End: 2019-12-04

## 2018-02-27 NOTE — MR AVS SNAPSHOT
"              After Visit Summary   2018    Sylvain Major    MRN: [id number]           Patient Information     Date Of Birth          1980        Visit Information        Provider Department      2018 1:30 PM Edilma Rodriguez MD [facility]        Today's Diagnoses     Type 2 diabetes mellitus without complication, without long-term current use of insulin (H)    -  1    Screening for thyroid disorder        PCOS (polycystic ovarian syndrome)           Follow-ups after your visit        Follow-up notes from your care team     Return in about 4 months (around 2018).      Who to contact     Please call your clinic at [phone] to:    Ask questions about your health    Make or cancel appointments    Discuss your medicines    Learn about your test results    Speak to your doctor            Additional Information About Your Visit        MyChart Information     Doctolib is an electronic gateway that provides easy, online access to your medical records. With Doctolib, you can request a clinic appointment, read your test results, renew a prescription or communicate with your care team.     To sign up for Do[employer]olib visit the website at [email or web address]   You will be asked to enter the access code listed below, as well as some personal information. Please follow the directions to create your username and password.     Your access code is: [email or web address]  Expires: 2018  2:34 PM     Your access code will  in 90 days. If you need help or a new code, please contact your [facility] or call [phone] for assistance.        Care EveryWhere ID     This is your Care EveryWhere ID. This could be used by other organizations to access your [place] medical records  [id number]        Your Vitals Were     Pulse Height BMI (Body Mass Index)             102 1.581 m (5' 2.25\") 25.49 kg/m2          Blood Pressure from Last 3 Encounters:   18 128/83 "   02/12/18 112/84   12/19/17 110/70    Weight from Last 3 Encounters:   02/27/18 63.7 kg (140 lb 8 oz)   02/12/18 64.4 kg (142 lb)   12/19/17 65.8 kg (145 lb)              We Performed the Following     Hemoglobin A1c POCT          Today's Medication Changes          These changes are accurate as of 2/27/18 11:59 PM.  If you have any questions, ask your nurse or doctor.               Start taking these medicines.        Dose/Directions    BLOOD GLUCOSE TEST STRIPS Strp   Used for:  Type 2 diabetes mellitus without complication, without long-term current use of insulin (H)        Dose:  1 strip   1 strip by Lancet route daily   Quantity:  100 each   Refills:  3         These medicines have changed or have updated prescriptions.        Dose/Directions    * metFORMIN 500 MG tablet   Commonly known as:  GLUCOPHAGE   This may have changed:  Another medication with the same name was added. Make sure you understand how and when to take each.   Used for:  Type 2 diabetes mellitus without complication, without long-term current use of insulin (H)        Dose:  1000 mg   Take 2 tablets (1,000 mg) by mouth 2 times daily (with meals)   Quantity:  360 tablet   Refills:  0       * metFORMIN 1000 MG tablet   Commonly known as:  GLUCOPHAGE   This may have changed:  You were already taking a medication with the same name, and this prescription was added. Make sure you understand how and when to take each.   Used for:  Type 2 diabetes mellitus without complication, without long-term current use of insulin (H)        Dose:  1000 mg   Take 1 tablet (1,000 mg) by mouth 2 times daily (with meals)   Quantity:  180 tablet   Refills:  3       * Notice:  This list has 2 medication(s) that are the same as other medications prescribed for you. Read the directions carefully, and ask your doctor or other care provider to review them with you.         Where to get your medicines      These medications were sent to [facility]/pharmacy #[id number] - [place],  [place] - [address] [facility]  [address] [facility] [place] [address]     Phone:  [phone]     BLOOD GLUCOSE TEST STRIPS Strp    metFORMIN 1000 MG tablet                Primary Care Provider Office Phone # Fax #    Janine Barrett [phone] [address]  [place] [address]        Equal Access to Services     [facility]: Hadii aad ku hadasho Soomaali, waaxda luqadaha, qaybta kaalmada adeegyada, waxay idiin hayaan adeeg kharash la'aan ah. So [facility] [phone].    ATENCIÓN: Si skyler staton, tiene a galvez disposición servicios gratuitos de asistencia lingüística. Llame al [phone].    We comply with applicable federal civil rights laws and [place] laws. We do not discriminate on the basis of race, color, national origin, age, disability, sex, sexual orientation, or gender identity.            Thank you!     Thank you for choosing [facility] ENDO[facility]  for your care. Our goal is always to provide you with excellent care. Hearing back from our patients is one way we can continue to improve our services. Please take a few minutes to complete the written survey that you may receive in the mail after your visit with us. Thank you!             Your Updated Medication List - Protect others around you: Learn how to safely use, store and throw away your medicines at www.disposemymeds.org.          This list is accurate as of 2/27/18 11:59 PM.  Always use your most recent med list.                   Brand Name Dispense Instructions for use Diagnosis    ACE/ARB/ARNI NOT PRESCRIBED (INTENTIONAL)      ACE & ARB not prescribed due to Current Pregnancy        APRI 0.15-30 MG-MCG per tablet   Generic drug:  desogestrel-ethinyl estradiol      1 tablet daily        aspirin 81 MG EC tablet     90 tablet    Take 1 tablet (81 mg) by mouth daily    Type 2 diabetes mellitus without complication, without long-term current use of insulin (H)       atorvastatin 40 MG tablet    LIPITOR    90 tablet     Take 1 tablet (40 mg) by mouth daily    Type 2 diabetes mellitus without complication, without long-term current use of insulin (H), Hyperlipidemia LDL goal <70       BLOOD GLUCOSE TEST STRIPS Strp     100 each    1 strip by Lancet route daily    Type 2 diabetes mellitus without complication, without long-term current use of insulin (H)       FISH OIL PO           * metFORMIN 500 MG tablet    GLUCOPHAGE    360 tablet    Take 2 tablets (1,000 mg) by mouth 2 times daily (with meals)    Type 2 diabetes mellitus without complication, without long-term current use of insulin (H)       * metFORMIN 1000 MG tablet    GLUCOPHAGE    180 tablet    Take 1 tablet (1,000 mg) by mouth 2 times daily (with meals)    Type 2 diabetes mellitus without complication, without long-term current use of insulin (H)       prenatal multivitamin plus iron 27-0.8 MG Tabs per tablet      Take 1 tablet by mouth daily        SUMAtriptan 25 MG tablet    IMITREX    18 tablet    Take 1-2 tablets (25-50 mg) by mouth at onset of headache for migraine May repeat in 2 hours. Max 8 tablets/24 hours.    Migraine without status migrainosus, not intractable, unspecified migraine type       vitamin D 2000 UNITS tablet      Take 2,000 Units by mouth        * Notice:  This list has 2 medication(s) that are the same as other medications prescribed for you. Read the directions carefully, and ask your doctor or other care provider to review them with you.

## 2018-02-27 NOTE — LETTER
Patient:  Sylvain Major  :   1980  MRN:     [id number]        Ms.Dhananjanie Major  [address] [address]        2018    Dear doctors,    I saw Sylvain today at [facility].   Her A1C was 6.1%, she is compliant to metformin.   We will further increase the dose of metformin to 1000 mg BID and currently she is safe to be pregnant at this level of A1C. She needs to come back as soon as she becomes pregnant.             Edilma Rodriguez MD

## 2018-02-27 NOTE — LETTER
2018       RE: Sylvain Major  [address] [address]     Dear Colleague,    Thank you for referring your patient, Sylvain Major, to the [facility] END[facility] at [facility]. Please see a copy of my visit note below.                                                                               - Endocrinology Initial Consultation -    Reason for visit/consult: Diabetes type 2    Primary care provider: Janine Barrett    HPI: A 36 yo female here for DM2.  Different from her PCP.   Patient was diagnosed gestational diabetes in  while she was on twin pregnancy secondary to IVF.  She was on NPH insulin.  In 19 weeks, she lost one baby due to membrane rupture, and a few weeks later she lost second baby.     Patient had a history of PCO S, diagnosed age 21.  She  over 6 years and has had infertility.  She went to [facility] since .  So far she underwent fresh embryo transfer in  -did not work, 1 frozen embryo transfer -did not work.  2016 frozen eggs transfer worked, however she had a miscarriage described above.   Currently seen by Dr. Xie OB/GYN at [facility]. Since her miscarriage, she could not concentrate her health care, and she was told to take care DM and dynlipidemina.  Her hemoglobin A1c 2017 was 9.1%. 2017 started metformin 500 mg BID.  Recent fastin-118 all the time.  Of note, [place] is [place], strong family history of diabetes type 2 in her father side.     Today A1C 6.1%.    Current Regimens:  Metformin 500 mg BID    Life Style:  Wake up: 6:30  Breakfast:2 eggs, sausages, low carb bread, tuna salad  Lunch: brown rice, [place]  Snack: fruits and tea  Dinner: burger, salad  Bedtime: 10:30    Exercise:  Boxing class twice a week    DM complications:  Retinopathy: no  Nephropathy: no  Neuropathy: no  Most recent LDL:   LDL Cholesterol Calculated   Date Value Ref Range  "Status   12/15/2017 38 <100 mg/dL Final     Comment:     Desirable:       <100 mg/dl       Past Medical/Surgical History:  Past Medical History:   Diagnosis Date     CARDIOVASCULAR SCREENING; LDL GOAL LESS THAN 160 1/2/2013     No past surgical history on file.    Allergies:  No Known Allergies    Current Medications   Current Outpatient Prescriptions   Medication     metFORMIN (GLUCOPHAGE) 1000 MG tablet     Glucose Blood (BLOOD GLUCOSE TEST STRIPS) STRP     Cholecalciferol (VITAMIN D) 2000 UNITS tablet     SUMAtriptan (IMITREX) 25 MG tablet     metFORMIN (GLUCOPHAGE) 500 MG tablet     APRI 0.15-30 MG-MCG per tablet     atorvastatin (LIPITOR) 40 MG tablet     Omega-3 Fatty Acids (FISH OIL PO)     Prenatal Vit-Fe Fumarate-FA (PRENATAL MULTIVITAMIN PLUS IRON) 27-0.8 MG TABS per tablet     aspirin 81 MG EC tablet     ACE/ARB NOT PRESCRIBED, INTENTIONAL,     No current facility-administered medications for this visit.        Family History:  Family History   Problem Relation Age of Onset     HEART DISEASE Mother      DIABETES Father      DIABETES Paternal Grandmother      DIABETES Paternal Grandfather        Social History:  Social History   Substance Use Topics     Smoking status: Never Smoker     Smokeless tobacco: Never Used     Alcohol use 0.0 oz/week     0 Standard drinks or equivalent per week      Comment: occ   Live with , Job: , working home.     ROS:  Full review of systems taken with the help of the intake sheet. Otherwise a complete 14 point review of systems was taken and is negative unless stated in the history above.      Physical Exam:   Blood pressure 128/83, pulse 102, height 1.581 m (5' 2.25\"), weight 63.7 kg (140 lb 8 oz)  General: well appearing, no acute distress, pleasant and conversant,   Mental Status/neuro: alert and oriented  Face: symmetrical, normal facial color  Eyes: anicteric, PERRL, no proptosis or lid lag  Neck: suppler, no lymphadenopahty, no acanthosis " nigricans  Thyroid: normal size and texture, no nodule palpable, no bruits  Heart: regular rhythm, S1S2, no murmur appreciated  Lung: clear to auscultation bilaterally  Abdomen: soft, NT/ND, no hepatomegaly  Legs: no swelling or edema  Feet: no deformities or ulcers, 2+ DP pulses, normal monofilament sensation      Labs : I reviewed data from epic and extract and summarize the pertinent data here.        10/15/2015 18:04 10/17/2016 15:56 9/13/2017 00:00 12/15/2017 07:52   Hemoglobin A1C 6.7 (H) 6.3 (H) 9.1 (H) 6.9 (H)     Lab Results   Component Value Date     12/15/2017      Lab Results   Component Value Date    POTASSIUM 4.0 12/15/2017     Lab Results   Component Value Date    CHLORIDE 107 12/15/2017     Lab Results   Component Value Date    CARMELA 8.6 12/15/2017     Lab Results   Component Value Date    CO2 20 12/15/2017     Lab Results   Component Value Date    BUN 13 12/15/2017     Lab Results   Component Value Date    CR 0.47 12/15/2017     Lab Results   Component Value Date     12/15/2017     Lab Results   Component Value Date    A1C 6.9 12/15/2017       Glucose Log: Patient did not bring glucometer today, but recalled fasting glucose in the morning 118-126.  One hour postprandial 140-150.       Assessment and Plan  37 year old female with diabetes type 2, infertility, PCO S    #Now she is compliant to her medication and diet, her A1c much improved from 9.1 to 6.1 percent for the past 6 months.  I think it is safe to proceed next for fertility treatment,     -I wrote a letter to her OB and IVF clinic today  -Increase metformin from 500 twice daily to 1000 mg twice daily  -Recheck A1c in 6 weeks, as well as TSH, free T4, prolactin level  -Instructed the patient to return to clinic once she becomes pregnant    #PCO S  - Currently on Metformin, will increase to 1000 mg bid      I spent 45 minutes with this patient face to face and explained the conditions and plans (more than 50% of time was  counseling/coordination of care, goal of management of DM during the pregnancy, diet) . The patient understood and is satisfied with today's visit. Return to clinic with me to be determined.       ---------Copy of letter ------------------  Patient:  Sylvain Major  :   1980  MRN:     [id number]        Ms.Dhananjanie Major  [address] [facility] [address]        2018    Dear doctors,    I saw Sylvain today at [facility].   Her A1C was 6.1%, she is compliant to metformin.   We will further increase the dose of metformin to 1000 mg BID and currently she is safe to be pregnant at this level of A1C. She needs to come back as soon as she becomes pregnant.             MD Edilma Ng MD  Staff Physician  Endocrinology and Metabolism  License: [id number]

## 2018-02-27 NOTE — NURSING NOTE
"Chief Complaint   Patient presents with     Consult     DIABETES       Initial /83  Pulse 102  Ht 1.581 m (5' 2.25\")  Wt 63.7 kg (140 lb 8 oz)  BMI 25.49 kg/m2 Estimated body mass index is 25.49 kg/(m^2) as calculated from the following:    Height as of this encounter: 1.581 m (5' 2.25\").    Weight as of this encounter: 63.7 kg (140 lb 8 oz).  Medication Reconciliation: complete       Performed A1C test - patient tolerated well.    Narda Wang, CMA       "

## 2018-02-27 NOTE — PROGRESS NOTES
- Endocrinology Initial Consultation -    Reason for visit/consult: Diabetes type 2    Primary care provider: Janine Barrett    HPI: A 36 yo female here for DM2.  Different from her PCP.   Patient was diagnosed gestational diabetes in  while she was on twin pregnancy secondary to IVF.  She was on NPH insulin.  In 19 weeks, she lost one baby due to membrane rupture, and a few weeks later she lost second baby.     Patient had a history of PCO S, diagnosed age 21.  She  over 6 years and has had infertility.  She went to [facility] clinic since .  So far she underwent fresh embryo transfer in  -did not work, 1 frozen embryo transfer -did not work.  2016 frozen eggs transfer worked, however she had a miscarriage described above.   Currently seen by Dr. Xie OB/GYN at [facility]. Since her miscarriage, she could not concentrate her health care, and she was told to take care DM and dynlipidemina.  Her hemoglobin A1c 2017 was 9.1%. 2017 started metformin 500 mg BID.  Recent fastin-118 all the time.  Of note, she is [place], strong family history of diabetes type 2 in her father side.     Today A1C 6.1%.    Current Regimens:  Metformin 500 mg BID    Life Style:  Wake up: 6:30  Breakfast:2 eggs, sausages, low carb bread, tuna salad  Lunch: brown rice, [place]  Snack: fruits and tea  Dinner: burger, salad  Bedtime: 10:30    Exercise:  Boxing class twice a week    DM complications:  Retinopathy: no  Nephropathy: no  Neuropathy: no  Most recent LDL:   LDL Cholesterol Calculated   Date Value Ref Range Status   12/15/2017 38 <100 mg/dL Final     Comment:     Desirable:       <100 mg/dl       Past Medical/Surgical History:  Past Medical History:   Diagnosis Date     CARDIOVASCULAR SCREENING; LDL GOAL LESS THAN 160 2013     No past surgical history on file.    Allergies:  No Known  "Allergies    Current Medications   Current Outpatient Prescriptions   Medication     metFORMIN (GLUCOPHAGE) 1000 MG tablet     Glucose Blood (BLOOD GLUCOSE TEST STRIPS) STRP     Cholecalciferol (VITAMIN D) 2000 UNITS tablet     SUMAtriptan (IMITREX) 25 MG tablet     metFORMIN (GLUCOPHAGE) 500 MG tablet     APRI 0.15-30 MG-MCG per tablet     atorvastatin (LIPITOR) 40 MG tablet     Omega-3 Fatty Acids (FISH OIL PO)     Prenatal Vit-Fe Fumarate-FA (PRENATAL MULTIVITAMIN PLUS IRON) 27-0.8 MG TABS per tablet     aspirin 81 MG EC tablet     ACE/ARB NOT PRESCRIBED, INTENTIONAL,     No current facility-administered medications for this visit.        Family History:  Family History   Problem Relation Age of Onset     HEART DISEASE Mother      DIABETES Father      DIABETES Paternal Grandmother      DIABETES Paternal Grandfather        Social History:  Social History   Substance Use Topics     Smoking status: Never Smoker     Smokeless tobacco: Never Used     Alcohol use 0.0 oz/week     0 Standard drinks or equivalent per week      Comment: occ   Live with , Job: , working home.     ROS:  Full review of systems taken with the help of the intake sheet. Otherwise a complete 14 point review of systems was taken and is negative unless stated in the history above.      Physical Exam:   Blood pressure 128/83, pulse 102, height 1.581 m (5' 2.25\"), weight 63.7 kg (140 lb 8 oz)  General: well appearing, no acute distress, pleasant and conversant,   Mental Status/neuro: alert and oriented  Face: symmetrical, normal facial color  Eyes: anicteric, PERRL, no proptosis or lid lag  Neck: suppler, no lymphadenopahty, no acanthosis nigricans  Thyroid: normal size and texture, no nodule palpable, no bruits  Heart: regular rhythm, S1S2, no murmur appreciated  Lung: clear to auscultation bilaterally  Abdomen: soft, NT/ND, no hepatomegaly  Legs: no swelling or edema  Feet: no deformities or ulcers, 2+ DP pulses, normal " monofilament sensation      Labs : I reviewed data from epic and extract and summarize the pertinent data here.        10/15/2015 18:04 10/17/2016 15:56 2017 00:00 12/15/2017 07:52   Hemoglobin A1C 6.7 (H) 6.3 (H) 9.1 (H) 6.9 (H)     Lab Results   Component Value Date     12/15/2017      Lab Results   Component Value Date    POTASSIUM 4.0 12/15/2017     Lab Results   Component Value Date    CHLORIDE 107 12/15/2017     Lab Results   Component Value Date    CARMELA 8.6 12/15/2017     Lab Results   Component Value Date    CO2 20 12/15/2017     Lab Results   Component Value Date    BUN 13 12/15/2017     Lab Results   Component Value Date    CR 0.47 12/15/2017     Lab Results   Component Value Date     12/15/2017     Lab Results   Component Value Date    A1C 6.9 12/15/2017       Glucose Log: Patient did not bring glucometer today, but recalled fasting glucose in the morning 118-126.  One hour postprandial 140-150.       Assessment and Plan  37 year old female with diabetes type 2, infertility, PCO S    #Now she is compliant to her medication and diet, her A1c much improved from 9.1 to 6.1 percent for the past 6 months.  I think it is safe to proceed next for fertility treatment,     -I wrote a letter to her OB and IVF clinic today  -Increase metformin from 500 twice daily to 1000 mg twice daily  -Recheck A1c in 6 weeks, as well as TSH, free T4, prolactin level  -Instructed the patient to return to clinic once she becomes pregnant    #PCO S  - Currently on Metformin, will increase to 1000 mg bid      I spent 45 minutes with this patient face to face and explained the conditions and plans (more than 50% of time was counseling/coordination of care, goal of management of DM during the pregnancy, diet) . The patient understood and is satisfied with today's visit. Return to clinic with me to be determined.       ---------Copy of letter ------------------  Patient:  Sylvain Major  :   1980  MRN:      [id number]        Ms.Dhananjanie Major  [address] [address]        February 27, 2018    Dear doctors,    I saw Sylvain today at [facility].   Her A1C was 6.1%, she is compliant to metformin.   We will further increase the dose of metformin to 1000 mg BID and currently she is safe to be pregnant at this level of A1C. She needs to come back as soon as she becomes pregnant.             MD Edilma Ng MD  Staff Physician  Endocrinology and Metabolism  License: [id number]

## 2018-02-28 ENCOUNTER — TELEPHONE (OUTPATIENT)
Dept: ENDOCRINOLOGY | Facility: CLINIC | Age: 38
End: 2018-02-28

## 2018-02-28 NOTE — TELEPHONE ENCOUNTER
Pt called regarding metformin dose.    Pt understood that her metformin was to be increased.    Pt is currently taking 1000 mg twice daily.    Please callback pt at [phone], to clarify dose & update Rx.

## 2018-03-01 NOTE — TELEPHONE ENCOUNTER
02/27/2018 Notes state: -Increase metformin from 500 twice daily to 1000 mg twice daily    Pt states she has been taking 1000mg 2x daily since October 2017 and is requesting further clarification - as Dr Rodriguez stated she was increasing dose.     Forwarded to Dr Rodriguez for additional clarification.

## 2018-03-12 ENCOUNTER — DOCUMENTATION ONLY (OUTPATIENT)
Dept: ENDOCRINOLOGY | Facility: CLINIC | Age: 38
End: 2018-03-12

## 2018-03-12 NOTE — PROGRESS NOTES
I talked with the patient, patient mentioned she is already metformin 1000 mg twice daily since October 2017 and concerning whether need to increase more dose or not.   I instructed her to check fasting glucose in the bedtime glucose this week and send us back for the number so that we will consider, could to be very small dose of Levemir since, she is planning to go for fertility treatment.     Edilma Rodriguez MD  Staff Physician  Endocrinology and Metabolism  [facility]  License: [id number]  Pager: [phone]

## 2018-03-19 DIAGNOSIS — E11.9 TYPE 2 DIABETES MELLITUS WITHOUT COMPLICATION, WITHOUT LONG-TERM CURRENT USE OF INSULIN (H): ICD-10-CM

## 2018-03-19 NOTE — TELEPHONE ENCOUNTER
"Requested Prescriptions   Pending Prescriptions Disp Refills     metFORMIN (GLUCOPHAGE) 500 MG tablet [Pharmacy Med Name: METFORMIN  MG TABLET]  Last Written Prescription Date:  2/27/18  Last Fill Quantity: 180,  # refills: 3   Last office visit: 2/12/2018 with prescribing provider:  Vincent   Future Office Visit:     360 tablet 0     Sig: TAKE 2 TABLETS (1,000 MG) BY MOUTH 2 TIMES DAILY (WITH MEALS)    Biguanide Agents Failed    3/19/2018  2:12 AM       Failed - Patient has had a Microalbumin in the past 12 mos.    No lab results found.         Passed - Blood pressure less than 140/90 in past 6 months    BP Readings from Last 3 Encounters:   02/27/18 128/83   02/12/18 112/84   12/19/17 110/70                Passed - Patient has documented LDL within the past 12 mos.    Recent Labs   Lab Test  12/15/17   0752   LDL  38            Passed - Patient is age 10 or older       Passed - Patient has documented A1c within the specified period of time.    Recent Labs   Lab Test  12/15/17   0752   A1C  6.9*            Passed - Patient's CR is NOT>1.4 OR Patient's EGFR is NOT<45 within past 12 mos.    Recent Labs   Lab Test  12/15/17   0752   GFRESTIMATED  >90   GFRESTBLACK  >90       Recent Labs   Lab Test  12/15/17   0752   CR  0.47*            Passed - Patient does NOT have a diagnosis of CHF.       Passed - Patient is not pregnant       Passed - Patient has not had a positive pregnancy test within the past 12 mos.        Passed - Recent (6 mo) or future (30 days) visit within the authorizing provider's specialty    Patient had office visit in the last 6 months or has a visit in the next 30 days with authorizing provider or within the authorizing provider's specialty.  See \"Patient Info\" tab in inbasket, or \"Choose Columns\" in Meds & Orders section of the refill encounter.              "

## 2018-04-05 DIAGNOSIS — E11.9 TYPE 2 DIABETES MELLITUS WITHOUT COMPLICATION, UNSPECIFIED LONG TERM INSULIN USE STATUS: Primary | ICD-10-CM

## 2018-04-05 RX ORDER — INSULIN DETEMIR 100 [IU]/ML
10 INJECTION, SOLUTION SUBCUTANEOUS EVERY MORNING
Qty: 15 ML | Refills: 3 | Status: SHIPPED | OUTPATIENT
Start: 2018-04-05 | End: 2018-11-14

## 2018-04-05 NOTE — PROGRESS NOTES
Dear Dr. Rodriguez, So sorry about the delay of this update. I was down with a flu and missed glucose reading many times hence I repeated the readings from the very beginning. Please see below for fasting and before bed glucose readings   3/26/2018    8am:  141mg/dl       10pm : 160mg/dl   3/27/2018    7am : 133mg/dl       11pm : 132mg/dl   3/28/2018    7:25am : 130mg/dl   11pm : 110 mg/dl   3/29/2019    7:20am : 130mg/dl   10:30pm : 128mg/dl     Also my IVF calendar has been set up and I am scheduled for the retrieval on 4/23/2018 (tentatively) and the transfer is scheduled for June.     Thank you!   Aaron Major         By responding this message, I talked with her and will start levemir 10 units daily and check am fasting daily before empryo transfer.   She will make appointment to see Dr. Ana Alicea in June.     Edilma Rodriguez MD  Staff Physician  Endocrinology and Metabolism  [facility]  License: [id number]  Pager: [phone]

## 2018-04-13 ENCOUNTER — OFFICE VISIT (OUTPATIENT)
Dept: FAMILY MEDICINE | Facility: CLINIC | Age: 38
End: 2018-04-13
Payer: COMMERCIAL

## 2018-04-13 VITALS
HEIGHT: 62 IN | TEMPERATURE: 96.4 F | HEART RATE: 102 BPM | OXYGEN SATURATION: 97 % | WEIGHT: 138 LBS | DIASTOLIC BLOOD PRESSURE: 85 MMHG | SYSTOLIC BLOOD PRESSURE: 126 MMHG | BODY MASS INDEX: 25.4 KG/M2

## 2018-04-13 DIAGNOSIS — E11.9 TYPE 2 DIABETES MELLITUS WITHOUT COMPLICATION, WITHOUT LONG-TERM CURRENT USE OF INSULIN (H): ICD-10-CM

## 2018-04-13 DIAGNOSIS — E28.2 PCOS (POLYCYSTIC OVARIAN SYNDROME): ICD-10-CM

## 2018-04-13 DIAGNOSIS — Z01.818 PREOP GENERAL PHYSICAL EXAM: Primary | ICD-10-CM

## 2018-04-13 LAB
ANION GAP SERPL CALCULATED.3IONS-SCNC: 10 MMOL/L (ref 3–14)
BUN SERPL-MCNC: 8 MG/DL (ref 7–30)
CALCIUM SERPL-MCNC: 9.3 MG/DL (ref 8.5–10.1)
CHLORIDE SERPL-SCNC: 108 MMOL/L (ref 94–109)
CO2 SERPL-SCNC: 21 MMOL/L (ref 20–32)
CREAT SERPL-MCNC: 0.47 MG/DL (ref 0.52–1.04)
ERYTHROCYTE [DISTWIDTH] IN BLOOD BY AUTOMATED COUNT: 13.3 % (ref 10–15)
GFR SERPL CREATININE-BSD FRML MDRD: >90 ML/MIN/1.7M2
GLUCOSE SERPL-MCNC: 85 MG/DL (ref 70–99)
HBA1C MFR BLD: 6.1 % (ref 0–5.6)
HCT VFR BLD AUTO: 39.2 % (ref 35–47)
HGB BLD-MCNC: 13.1 G/DL (ref 11.7–15.7)
MCH RBC QN AUTO: 30.5 PG (ref 26.5–33)
MCHC RBC AUTO-ENTMCNC: 33.4 G/DL (ref 31.5–36.5)
MCV RBC AUTO: 91 FL (ref 78–100)
PLATELET # BLD AUTO: 259 10E9/L (ref 150–450)
POTASSIUM SERPL-SCNC: 3.8 MMOL/L (ref 3.4–5.3)
PROLACTIN SERPL-MCNC: 6 UG/L (ref 3–27)
RBC # BLD AUTO: 4.29 10E12/L (ref 3.8–5.2)
SODIUM SERPL-SCNC: 139 MMOL/L (ref 133–144)
T4 FREE SERPL-MCNC: 1.18 NG/DL (ref 0.76–1.46)
TSH SERPL DL<=0.005 MIU/L-ACNC: 2.08 MU/L (ref 0.4–4)
WBC # BLD AUTO: 11.4 10E9/L (ref 4–11)

## 2018-04-13 PROCEDURE — 84439 ASSAY OF FREE THYROXINE: CPT | Performed by: INTERNAL MEDICINE

## 2018-04-13 PROCEDURE — 80048 BASIC METABOLIC PNL TOTAL CA: CPT | Performed by: INTERNAL MEDICINE

## 2018-04-13 PROCEDURE — 36415 COLL VENOUS BLD VENIPUNCTURE: CPT | Performed by: INTERNAL MEDICINE

## 2018-04-13 PROCEDURE — 84443 ASSAY THYROID STIM HORMONE: CPT | Performed by: INTERNAL MEDICINE

## 2018-04-13 PROCEDURE — 84146 ASSAY OF PROLACTIN: CPT | Performed by: INTERNAL MEDICINE

## 2018-04-13 PROCEDURE — 85027 COMPLETE CBC AUTOMATED: CPT | Performed by: INTERNAL MEDICINE

## 2018-04-13 PROCEDURE — 83036 HEMOGLOBIN GLYCOSYLATED A1C: CPT | Performed by: INTERNAL MEDICINE

## 2018-04-13 PROCEDURE — 99214 OFFICE O/P EST MOD 30 MIN: CPT | Performed by: INTERNAL MEDICINE

## 2018-04-13 NOTE — PROGRESS NOTES
[facility] [place] [facility]  [address] [address]  [phone]  Dept: [phone]    PRE-OP EVALUATION:  Today's date: 2018    Sylvain Major (: 1980) presents for pre-operative evaluation assessment as requested by Dr. jimenez   She requires evaluation and anesthesia risk assessment prior to undergoing surgery/procedure for treatment of  Egg retrieval ivf     Proposed Surgery/ Procedure: ivf   Date of Surgery/ Procedure: 2018   Time of Surgery/ Procedure: unknown   Hospital/Surgical Facility:  [facility]   Fax number for surgical facility: [phone]   Primary Physician: Janine Barrett  Type of Anesthesia Anticipated: General    Patient has a Health Care Directive or Living Will:  NO    1. NO - Do you have a history of heart attack, stroke, stent, bypass or surgery on an artery in the head, neck, heart or legs?  2. NO - Do you ever have any pain or discomfort in your chest?  3. NO - Do you have a history of  Heart Failure?  4. NO - Are you troubled by shortness of breath when: walking on the level, up a slight hill or at night?  5. NO - Do you currently have a cold, bronchitis or other respiratory infection?  6. NO - Do you have a cough, shortness of breath or wheezing?  7. NO - Do you sometimes get pains in the calves of your legs when you walk?  8. NO - Do you or anyone in your family have previous history of blood clots?  9. NO - Do you or does anyone in your family have a serious bleeding problem such as prolonged bleeding following surgeries or cuts?  10. NO - Have you ever had problems with anemia or been told to take iron pills?  11. NO - Have you had any abnormal blood loss such as black, tarry or bloody stools, or abnormal vaginal bleeding?  12. NO - Have you ever had a blood transfusion?  13. NO - Have you or any of your relatives ever had problems with anesthesia?  14. NO - Do you have sleep apnea, excessive snoring or daytime  drowsiness?  15. NO - Do you have any prosthetic heart valves?  16. NO - Do you have prosthetic joints?  17. NO - Is there any chance that you may be pregnant?      HPI:     HPI related to upcoming procedure: Julia is a 38 y/o female with PCOS and Type 2 Diabetes who has undergone IVF in the past but unfortunately experienced two intra-uterine demises. She was diagnosed with Diabetes during that pregnancy. She has been seeing endocrinology and myself to get better control of her blood sugars and her cholesterol prior to attempting another IVF.     Fasting blood sugars are ranging 110's - 130 mg/dL. She saw her endocrinologist recently and had an A1c which was 6.1%. Metformin was increased 1000 mg BID. She then noted her post-prandial sugars were a little elevated and was advised to start Levemir 10 units in the morning after the egg retrieval.     See problem list for active medical problems.  Problems all longstanding and stable, except as noted/documented.  See ROS for pertinent symptoms related to these conditions.                                                                                                                                                          .    MEDICAL HISTORY:     Patient Active Problem List    Diagnosis Date Noted     Type 2 diabetes mellitus without complication, without long-term current use of insulin (H) 10/20/2017     Priority: Medium     Hyperlipidemia LDL goal <70 10/20/2017     Priority: Medium     Atypical chest pain 10/20/2017     Priority: Medium     PROM (premature rupture of membranes) 2016     Priority: Medium     Type 2 diabetes mellitus affecting pregnancy, antepartum 10/31/2016     Priority: Medium     Dichorionic diamniotic twin gestation 10/31/2016     Priority: Medium      premature rupture of membranes (PPROM) with unknown onset of labor 10/31/2016     Priority: Medium     PPROM fetus 1 10/16/16 (19+2 wks)       Oligohydramnios antepartum, second  trimester, fetus 1 10/31/2016     Priority: Medium     Insulin controlled gestational diabetes mellitus (GDM) in second trimester 10/25/2016     Priority: Medium      premature rupture of membranes (PPROM) delivered, current hospitalization 10/17/2016     Priority: Medium     CARDIOVASCULAR SCREENING; LDL GOAL LESS THAN 160 2013     Priority: Medium      Past Medical History:   Diagnosis Date     CARDIOVASCULAR SCREENING; LDL GOAL LESS THAN 160 2013     No past surgical history on file.  Current Outpatient Prescriptions   Medication Sig Dispense Refill     LEVEMIR FLEXPEN/FLEXTOUCH 100 UNIT/ML soln Inject 10 Units Subcutaneous every morning 15 mL 3     insulin pen needle (BD NANNETTE U/F) 32G X 4 MM Use 1 pen needles daily or as directed. 100 each 3     metFORMIN (GLUCOPHAGE) 1000 MG tablet Take 1 tablet (1,000 mg) by mouth 2 times daily (with meals) 180 tablet 3     Glucose Blood (BLOOD GLUCOSE TEST STRIPS) STRP 1 strip by Lancet route daily 100 each 3     Cholecalciferol (VITAMIN D) 2000 UNITS tablet Take 2,000 Units by mouth       SUMAtriptan (IMITREX) 25 MG tablet Take 1-2 tablets (25-50 mg) by mouth at onset of headache for migraine May repeat in 2 hours. Max 8 tablets/24 hours. 18 tablet 1     metFORMIN (GLUCOPHAGE) 500 MG tablet Take 2 tablets (1,000 mg) by mouth 2 times daily (with meals) 360 tablet 0     APRI 0.15-30 MG-MCG per tablet 1 tablet daily  3     atorvastatin (LIPITOR) 40 MG tablet Take 1 tablet (40 mg) by mouth daily 90 tablet 1     Omega-3 Fatty Acids (FISH OIL PO)        Prenatal Vit-Fe Fumarate-FA (PRENATAL MULTIVITAMIN PLUS IRON) 27-0.8 MG TABS per tablet Take 1 tablet by mouth daily       aspirin 81 MG EC tablet Take 1 tablet (81 mg) by mouth daily 90 tablet 3     ACE/ARB NOT PRESCRIBED, INTENTIONAL, ACE & ARB not prescribed due to Current Pregnancy       OTC products: None, except as noted above    No Known Allergies   Latex Allergy: NO    Social History   Substance Use Topics  "    Smoking status: Never Smoker     Smokeless tobacco: Never Used     Alcohol use 0.0 oz/week     0 Standard drinks or equivalent per week      Comment: occ     History   Drug Use No       REVIEW OF SYSTEMS:   CONSTITUTIONAL: NEGATIVE for fever, chills, change in weight  ENT/MOUTH: NEGATIVE for ear, mouth and throat problems  RESP: NEGATIVE for significant cough or SOB  CV: NEGATIVE for chest pain, palpitations or peripheral edema    EXAM:   /85 (BP Location: Right arm, Cuff Size: Adult Regular)  Pulse 102  Temp 96.4  F (35.8  C) (Oral)  Ht 5' 2.25\" (1.581 m)  Wt 138 lb (62.6 kg)  SpO2 97%  BMI 25.04 kg/m2  GENERAL APPEARANCE: healthy, alert and no distress  HENT: ear canals and TM's normal and nose and mouth without ulcers or lesions  RESP: lungs clear to auscultation - no rales, rhonchi or wheezes  CV: regular rate and rhythm, normal S1 S2, no S3 or S4 and no murmur, click or rub   ABDOMEN: soft, nontender, no HSM or masses and bowel sounds normal  NEURO: Normal strength and tone, sensory exam grossly normal, mentation intact and speech normal    DIAGNOSTICS:     Labs Resulted Today:   Results for orders placed or performed in visit on 04/13/18   Hemoglobin A1c   Result Value Ref Range    Hemoglobin A1C 6.1 (H) 0 - 5.6 %   Prolactin   Result Value Ref Range    Prolactin 6 3 - 27 ug/L   TSH   Result Value Ref Range    TSH 2.08 0.40 - 4.00 mU/L   T4, free   Result Value Ref Range    T4 Free 1.18 0.76 - 1.46 ng/dL   Basic metabolic panel   Result Value Ref Range    Sodium 139 133 - 144 mmol/L    Potassium 3.8 3.4 - 5.3 mmol/L    Chloride 108 94 - 109 mmol/L    Carbon Dioxide 21 20 - 32 mmol/L    Anion Gap 10 3 - 14 mmol/L    Glucose 85 70 - 99 mg/dL    Urea Nitrogen 8 7 - 30 mg/dL    Creatinine 0.47 (L) 0.52 - 1.04 mg/dL    GFR Estimate >90 >60 mL/min/1.7m2    GFR Estimate If Black >90 >60 mL/min/1.7m2    Calcium 9.3 8.5 - 10.1 mg/dL   CBC with platelets   Result Value Ref Range    WBC 11.4 (H) 4.0 - " 11.0 10e9/L    RBC Count 4.29 3.8 - 5.2 10e12/L    Hemoglobin 13.1 11.7 - 15.7 g/dL    Hematocrit 39.2 35.0 - 47.0 %    MCV 91 78 - 100 fl    MCH 30.5 26.5 - 33.0 pg    MCHC 33.4 31.5 - 36.5 g/dL    RDW 13.3 10.0 - 15.0 %    Platelet Count 259 150 - 450 10e9/L       Recent Labs   Lab Test  12/15/17   0752 09/13/17 11/03/16   1213  11/01/16   2313  10/17/16   1556  10/15/15   1804   HGB   --    --   11.9  13.2  12.5  13.8   PLT   --    --    --   194  215   --    INR   --    --    --   1.06   --    --    NA  138   --    --    --    --   137   POTASSIUM  4.0  4.0   --    --    --   3.6   CR  0.47*  0.57   --    --    --   0.50*   A1C  6.9*  9.1*   --    --   6.3*  6.7*      IMPRESSION:   Reason for surgery/procedure: IVF retrieval  Diagnosis/reason for consult: Pre-op for the above    The proposed surgical procedure is considered INTERMEDIATE risk.    REVISED CARDIAC RISK INDEX  The patient has the following serious cardiovascular risks for perioperative complications such as (MI, PE, VFib and 3  AV Block):  No serious cardiac risks  INTERPRETATION: 0 risks: Class I (very low risk - 0.4% complication rate)    The patient has the following additional risks for perioperative complications:  No identified additional risks      ICD-10-CM    1. Preop general physical exam Z01.818        RECOMMENDATIONS:       --Patient is to take all scheduled medications on the day of surgery EXCEPT for modifications listed below.    Diabetes Medication Use  -----Hold usual oral and non-insulin diabetic meds (e.g. Metformin, Actos, Glipizide) while NPO.       APPROVAL GIVEN to proceed with proposed procedure, without further diagnostic evaluation       Signed Electronically by: Janine Barrett MD    Copy of this evaluation report is provided to requesting physician.    [facility] Preop Guidelines    Revised Cardiac Risk Index

## 2018-04-13 NOTE — MR AVS SNAPSHOT
After Visit Summary   4/13/2018    Sylvain Major    MRN: [id number]           Patient Information     Date Of Birth          1980        Visit Information        Provider Department      4/13/2018 9:20 AM Janine Barrett MD [facility] [place]        Today's Diagnoses     Preop general physical exam    -  1    Type 2 diabetes mellitus without complication, without long-term current use of insulin (H)        PCOS (polycystic ovarian syndrome)          Care Instructions      Before Your Surgery      Call your surgeon if there is any change in your health. This includes signs of a cold or flu (such as a sore throat, runny nose, cough, rash or fever).    Do not smoke, drink alcohol or take over the counter medicine (unless your surgeon or primary care doctor tells you to) for the 24 hours before and after surgery.    If you take prescribed drugs: Follow your doctor s orders about which medicines to take and which to stop until after surgery.    Eating and drinking prior to surgery: follow the instructions from your surgeon    Take a shower or bath the night before surgery. Use the soap your surgeon gave you to gently clean your skin. If you do not have soap from your surgeon, use your regular soap. Do not shave or scrub the surgery site.  Wear clean pajamas and have clean sheets on your bed.           Follow-ups after your visit        Your next 10 appointments already scheduled     May 09, 2018 10:30 AM CDT   (Arrive by 10:15 AM)   RETURN DIABETES with ISIDRA Morocho [facility] ([facility])    [address] [place] [id number] [phone]              Who to contact     If you have questions or need follow up information about today's clinic visit or your schedule please contact [facility] [place] directly at [phone].  Normal or non-critical lab and imaging results will be communicated  "to you by [employer], letter or phone within 4 business days after the clinic has received the results. If you do not hear from us within 7 days, please contact the clinic through [employer] or phone. If you have a critical or abnormal lab result, we will notify you by phone as soon as possible.  Submit refill requests through [employer] or call your pharmacy and they will forward the refill request to us. Please allow 3 business days for your refill to be completed.          Additional Information About Your Visit        [employer] Information     [employer] gives you secure access to your electronic health record. If you see a primary care provider, you can also send messages to your care team and make appointments. If you have questions, please call your primary care clinic.  If you do not have a primary care provider, please call [phone] and they will assist you.        Care EveryWhere ID     This is your Care EveryWhere ID. This could be used by other organizations to access your [place] [facility] medical records  [id number]        Your Vitals Were     Pulse Temperature Height Pulse Oximetry BMI (Body Mass Index)       102 96.4  F (35.8  C) (Oral) 5' 2.25\" (1.581 m) 97% 25.04 kg/m2        Blood Pressure from Last 3 Encounters:   04/13/18 126/85   02/27/18 128/83   02/12/18 112/84    Weight from Last 3 Encounters:   04/13/18 138 lb (62.6 kg)   02/27/18 140 lb 8 oz (63.7 kg)   02/12/18 142 lb (64.4 kg)              We Performed the Following     Basic metabolic panel     CBC with platelets     Hemoglobin A1c     Prolactin     T4, free     TSH          Today's Medication Changes          These changes are accurate as of 4/13/18 11:59 PM.  If you have any questions, ask your nurse or doctor.               Stop taking these medicines if you haven't already. Please contact your care team if you have questions.     atorvastatin 40 MG tablet   Commonly known as:  LIPITOR   Stopped by:  Janine Barrett MD                    Primary " Care Provider Office Phone # Fax #    Janine Barrett [phone] [address]  [place] [address]        Equal Access to Services     JOVON MURILLO : Hadii aad ku hadevyo Soomaali, waaxda luqadaha, qaybta kaalmada adeegyada, waxdenisha godwinin vandanan donna huff laMurrayannalise manrique. So [facility] [phone].    ATENCIÓN: Si habla español, tiene a galvez disposición servicios gratuitos de asistencia lingüística. Llame al [phone].    We comply with applicable federal civil rights laws and [place] laws. We do not discriminate on the basis of race, color, national origin, age, disability, sex, sexual orientation, or gender identity.            Thank you!     Thank you for choosing [facility] [place]  for your care. Our goal is always to provide you with excellent care. Hearing back from our patients is one way we can continue to improve our services. Please take a few minutes to complete the written survey that you may receive in the mail after your visit with us. Thank you!             Your Updated Medication List - Protect others around you: Learn how to safely use, store and throw away your medicines at www.disposemymeds.org.          This list is accurate as of 4/13/18 11:59 PM.  Always use your most recent med list.                   Brand Name Dispense Instructions for use Diagnosis    ACE/ARB/ARNI NOT PRESCRIBED (INTENTIONAL)      ACE & ARB not prescribed due to Current Pregnancy        aspirin 81 MG EC tablet     90 tablet    Take 1 tablet (81 mg) by mouth daily    Type 2 diabetes mellitus without complication, without long-term current use of insulin (H)       BLOOD GLUCOSE TEST STRIPS Strp     100 each    1 strip by Lancet route daily    Type 2 diabetes mellitus without complication, without long-term current use of insulin (H)       FISH OIL PO           insulin pen needle 32G X 4 MM    BD NANNETTE U/F    100 each    Use 1 pen needles daily or as directed.    Type 2 diabetes  mellitus without complication, unspecified long term insulin use status (H)       LEVEMIR FLEXPEN/FLEXTOUCH 100 UNIT/ML injection   Generic drug:  insulin detemir     15 mL    Inject 10 Units Subcutaneous every morning    Type 2 diabetes mellitus without complication, unspecified long term insulin use status (H)       metFORMIN 1000 MG tablet    GLUCOPHAGE    180 tablet    Take 1 tablet (1,000 mg) by mouth 2 times daily (with meals)    Type 2 diabetes mellitus without complication, without long-term current use of insulin (H)       prenatal multivitamin plus iron 27-0.8 MG Tabs per tablet      Take 1 tablet by mouth daily        SUMAtriptan 25 MG tablet    IMITREX    18 tablet    Take 1-2 tablets (25-50 mg) by mouth at onset of headache for migraine May repeat in 2 hours. Max 8 tablets/24 hours.    Migraine without status migrainosus, not intractable, unspecified migraine type       vitamin D 2000 units tablet      Take 2,000 Units by mouth

## 2018-05-22 ENCOUNTER — TRANSFERRED RECORDS (OUTPATIENT)
Dept: HEALTH INFORMATION MANAGEMENT | Facility: CLINIC | Age: 38
End: 2018-05-22

## 2018-06-02 ENCOUNTER — OFFICE VISIT (OUTPATIENT)
Dept: URGENT CARE | Facility: URGENT CARE | Age: 38
End: 2018-06-02
Payer: COMMERCIAL

## 2018-06-02 VITALS
TEMPERATURE: 98.2 F | RESPIRATION RATE: 16 BRPM | DIASTOLIC BLOOD PRESSURE: 70 MMHG | BODY MASS INDEX: 25.04 KG/M2 | HEART RATE: 100 BPM | WEIGHT: 138 LBS | SYSTOLIC BLOOD PRESSURE: 110 MMHG

## 2018-06-02 DIAGNOSIS — L29.9 ITCHING: ICD-10-CM

## 2018-06-02 DIAGNOSIS — L23.9 ALLERGIC CONTACT DERMATITIS, UNSPECIFIED TRIGGER: Primary | ICD-10-CM

## 2018-06-02 PROCEDURE — 99213 OFFICE O/P EST LOW 20 MIN: CPT | Performed by: PHYSICIAN ASSISTANT

## 2018-06-02 RX ORDER — CETIRIZINE HYDROCHLORIDE 10 MG/1
10 TABLET ORAL EVERY EVENING
Qty: 30 TABLET | Refills: 1 | Status: SHIPPED | OUTPATIENT
Start: 2018-06-02 | End: 2019-12-04

## 2018-06-02 RX ORDER — TRIAMCINOLONE ACETONIDE 1 MG/ML
LOTION TOPICAL 3 TIMES DAILY
Qty: 60 ML | Refills: 0 | Status: SHIPPED | OUTPATIENT
Start: 2018-06-02 | End: 2019-09-10

## 2018-06-02 RX ORDER — METHYLPREDNISOLONE 4 MG
TABLET, DOSE PACK ORAL
Qty: 21 TABLET | Refills: 0 | Status: ON HOLD | OUTPATIENT
Start: 2018-06-02 | End: 2018-11-13

## 2018-06-02 NOTE — MR AVS SNAPSHOT
After Visit Summary   6/2/2018    Sylvain Major    MRN: [id number]           Patient Information     Date Of Birth          1980        Visit Information        Provider Department      6/2/2018 3:05 PM Elpidio Mcbride PA-C [facility]        Today's Diagnoses     Allergic contact dermatitis, unspecified trigger    -  1    Itching           Follow-ups after your visit        Who to contact     If you have questions or need follow up information about today's clinic visit or your schedule please contact [facility] [place] directly at [phone].  Normal or non-critical lab and imaging results will be communicated to you by [employer], letter or phone within 4 business days after the clinic has received the results. If you do not hear from us within 7 days, please contact the clinic through [employer] or phone. If you have a critical or abnormal lab result, we will notify you by phone as soon as possible.  Submit refill requests through [employer] or call your pharmacy and they will forward the refill request to us. Please allow 3 business days for your refill to be completed.          Additional Information About Your Visit        MyChart Information     [employer] gives you secure access to your electronic health record. If you see a primary care provider, you can also send messages to your care team and make appointments. If you have questions, please call your primary care clinic.  If you do not have a primary care provider, please call [phone] and they will assist you.        Care EveryWhere ID     This is your Care EveryWhere ID. This could be used by other organizations to access your [place] medical records  [id number]        Your Vitals Were     Pulse Temperature Respirations BMI (Body Mass Index)          100 98.2  F (36.8  C) (Oral) 16 25.04 kg/m2         Blood Pressure from Last 3 Encounters:   06/02/18 110/70   04/13/18 126/85    02/27/18 128/83    Weight from Last 3 Encounters:   06/02/18 138 lb (62.6 kg)   04/13/18 138 lb (62.6 kg)   02/27/18 140 lb 8 oz (63.7 kg)              Today, you had the following     No orders found for display         Today's Medication Changes          These changes are accurate as of 6/2/18  3:59 PM.  If you have any questions, ask your nurse or doctor.               Start taking these medicines.        Dose/Directions    cetirizine 10 MG tablet   Commonly known as:  zyrTEC   Used for:  Allergic contact dermatitis, unspecified trigger, Itching   Started by:  Elpidio Mcbride PA-C        Dose:  10 mg   Take 1 tablet (10 mg) by mouth every evening   Quantity:  30 tablet   Refills:  1       methylPREDNISolone 4 MG tablet   Commonly known as:  MEDROL DOSEPAK   Used for:  Allergic contact dermatitis, unspecified trigger, Itching   Started by:  Elpidio Mcbride PA-C        Follow package instructions   Quantity:  21 tablet   Refills:  0       triamcinolone 0.1 % lotion   Commonly known as:  KENALOG   Used for:  Allergic contact dermatitis, unspecified trigger, Itching   Started by:  Elpidio Mcbride PA-C        Apply topically 3 times daily   Quantity:  60 mL   Refills:  0            Where to get your medicines      [facility] [address] [place] [address] AT [place] [address] & [address] [address]    Hours:  24-hours Phone:  [phone]     cetirizine 10 MG tablet    methylPREDNISolone 4 MG tablet    triamcinolone 0.1 % lotion                Primary Care Provider Office Phone # Fax #    Janine Barrett [phone]        [address]  [place] [address]        Equal Access to Services     ELMA MURILLO AH: Yadira Araujo, walondon muñiz, qaybta kaalmada belkis, alina manrique. So [facility] [phone].    ATENCIÓN: Si habla español, tiene a galvez disposición  servicios gratuitos de asistencia lingüística. Zelalem day [phone].    We comply with applicable federal civil rights laws and [place] laws. We do not discriminate on the basis of race, color, national origin, age, disability, sex, sexual orientation, or gender identity.            Thank you!     Thank you for choosing [facility]  for your care. Our goal is always to provide you with excellent care. Hearing back from our patients is one way we can continue to improve our services. Please take a few minutes to complete the written survey that you may receive in the mail after your visit with us. Thank you!             Your Updated Medication List - Protect others around you: Learn how to safely use, store and throw away your medicines at www.disposemymeds.org.          This list is accurate as of 6/2/18  3:59 PM.  Always use your most recent med list.                   Brand Name Dispense Instructions for use Diagnosis    ACE/ARB/ARNI NOT PRESCRIBED (INTENTIONAL)      ACE & ARB not prescribed due to Current Pregnancy        aspirin 81 MG EC tablet     90 tablet    Take 1 tablet (81 mg) by mouth daily    Type 2 diabetes mellitus without complication, without long-term current use of insulin (H)       BLOOD GLUCOSE TEST STRIPS Strp     100 each    1 strip by Lancet route daily    Type 2 diabetes mellitus without complication, without long-term current use of insulin (H)       cetirizine 10 MG tablet    zyrTEC    30 tablet    Take 1 tablet (10 mg) by mouth every evening    Allergic contact dermatitis, unspecified trigger, Itching       FISH OIL PO           insulin pen needle 32G X 4 MM    BD NANNETTE U/F    100 each    Use 1 pen needles daily or as directed.    Type 2 diabetes mellitus without complication, unspecified long term insulin use status (H)       LEVEMIR FLEXPEN/FLEXTOUCH 100 UNIT/ML injection   Generic drug:  insulin detemir     15 mL    Inject 10 Units Subcutaneous every  morning    Type 2 diabetes mellitus without complication, unspecified long term insulin use status (H)       metFORMIN 1000 MG tablet    GLUCOPHAGE    180 tablet    Take 1 tablet (1,000 mg) by mouth 2 times daily (with meals)    Type 2 diabetes mellitus without complication, without long-term current use of insulin (H)       methylPREDNISolone 4 MG tablet    MEDROL DOSEPAK    21 tablet    Follow package instructions    Allergic contact dermatitis, unspecified trigger, Itching       prenatal multivitamin plus iron 27-0.8 MG Tabs per tablet      Take 1 tablet by mouth daily        SUMAtriptan 25 MG tablet    IMITREX    18 tablet    Take 1-2 tablets (25-50 mg) by mouth at onset of headache for migraine May repeat in 2 hours. Max 8 tablets/24 hours.    Migraine without status migrainosus, not intractable, unspecified migraine type       triamcinolone 0.1 % lotion    KENALOG    60 mL    Apply topically 3 times daily    Allergic contact dermatitis, unspecified trigger, Itching       vitamin D 2000 units tablet      Take 2,000 Units by mouth

## 2018-06-02 NOTE — PROGRESS NOTES
SUBJECTIVE:  Sylvain Major is a 37 year old female who presents to the clinic today for a rash.  Onset of rash was 2 week(s) ago.   Rash is still present.  Location of the rash: exposed areas of arms and legs.  Quality/symptoms of rash: itching   Symptoms are mild to moderate and rash seems to be ongoing.  Previous history of a similar rash? yes  Recent exposure history: none known  Recent new medications: none  Associated symptoms include: itching and rash.    Past Medical History:   Diagnosis Date     CARDIOVASCULAR SCREENING; LDL GOAL LESS THAN 160 1/2/2013     ALLERGIES   No Known Allergies     Social History   Substance Use Topics     Smoking status: Never Smoker     Smokeless tobacco: Never Used     Alcohol use 0.0 oz/week     0 Standard drinks or equivalent per week      Comment: occ       ROS:  CONSTITUTIONAL:NEGATIVE for fever, chills, change in weight  INTEGUMENTARY/SKIN: POSITIVE for itching on exposed arms and legs  ENT/MOUTH: NEGATIVE for ear, mouth and throat problems  RESP:NEGATIVE for significant cough or SOB  CV: NEGATIVE for chest pain, palpitations or peripheral edema  GI: NEGATIVE for nausea, abdominal pain, heartburn, or change in bowel habits  MUSCULOSKELETAL: NEGATIVE for significant arthralgias or myalgia  NEURO: NEGATIVE for weakness, dizziness or paresthesias    EXAM:   /70 (Cuff Size: Adult Regular)  Pulse 100  Temp 98.2  F (36.8  C) (Oral)  Resp 16  Wt 138 lb (62.6 kg)  BMI 25.04 kg/m2  GENERAL: alert, no acute distress.  SKIN: Rash description:    Distribution: localized  Location: upper and lower legs bilaterally    Color: skin color,  Lesion type: macular, blotchy with itching  GENERAL APPEARANCE: healthy, alert and no distress  EYES: EOMI,  PERRL, conjunctiva clear  NECK: supple, non-tender to palpation, no adenopathy noted  RESP: lungs clear to auscultation - no rales, rhonchi or wheezes  CV: regular rates and rhythm, normal S1 S2, no murmur  noted    ASSESSMENT/PLAN:    ICD-10-CM    1. Allergic contact dermatitis, unspecified trigger L23.9 cetirizine (ZYRTEC) 10 MG tablet     methylPREDNISolone (MEDROL DOSEPAK) 4 MG tablet     triamcinolone (KENALOG) 0.1 % lotion   2. Itching L29.9 cetirizine (ZYRTEC) 10 MG tablet     methylPREDNISolone (MEDROL DOSEPAK) 4 MG tablet     triamcinolone (KENALOG) 0.1 % lotion       Orders Placed This Encounter     cetirizine (ZYRTEC) 10 MG tablet     methylPREDNISolone (MEDROL DOSEPAK) 4 MG tablet     triamcinolone (KENALOG) 0.1 % lotion       1) See today's orders.  2) Follow-up with primary clinic if not improving

## 2018-06-03 DIAGNOSIS — E78.5 HYPERLIPIDEMIA LDL GOAL <70: ICD-10-CM

## 2018-06-03 DIAGNOSIS — E11.9 TYPE 2 DIABETES MELLITUS WITHOUT COMPLICATION, WITHOUT LONG-TERM CURRENT USE OF INSULIN (H): ICD-10-CM

## 2018-06-04 RX ORDER — ATORVASTATIN CALCIUM 40 MG/1
TABLET, FILM COATED ORAL
Qty: 90 TABLET | Refills: 1 | OUTPATIENT
Start: 2018-06-04

## 2018-06-04 NOTE — TELEPHONE ENCOUNTER
Patient states that she is no longer taking atorvastatin. Stopped by Dr. Barrera due to patient starting IVF. Patient states that Dr. Barrett is aware that it would be stopped with IVF. Patient states that she is continuing metformin and Levemir. Kaylynn Victoria RN

## 2018-06-04 NOTE — TELEPHONE ENCOUNTER
atorvastatin (LIPITOR) 40 MG tablet      Last Written Prescription Date:  ?  Last Fill Quantity: ?,   # refills: ?  Last Office Visit: ?  Future Office visit:       Routing refill request to provider for review/approval because:  Drug not active on patient's medication list

## 2018-06-04 NOTE — TELEPHONE ENCOUNTER
Please call and check with the patient or pharmacy, what dose she is taking. When was it prescribed. When was the last time it was filled.

## 2018-10-01 ENCOUNTER — PRE VISIT (OUTPATIENT)
Dept: MATERNAL FETAL MEDICINE | Facility: CLINIC | Age: 38
End: 2018-10-01

## 2018-10-05 ENCOUNTER — TRANSFERRED RECORDS (OUTPATIENT)
Dept: HEALTH INFORMATION MANAGEMENT | Facility: CLINIC | Age: 38
End: 2018-10-05

## 2018-10-08 ENCOUNTER — HOSPITAL ENCOUNTER (OUTPATIENT)
Dept: ULTRASOUND IMAGING | Facility: CLINIC | Age: 38
Discharge: HOME | End: 2018-10-08
Attending: OBSTETRICS & GYNECOLOGY | Admitting: OBSTETRICS & GYNECOLOGY
Payer: COMMERCIAL

## 2018-10-08 ENCOUNTER — OFFICE VISIT (OUTPATIENT)
Dept: FAMILY MEDICINE | Facility: CLINIC | Age: 38
End: 2018-10-08
Payer: COMMERCIAL

## 2018-10-08 ENCOUNTER — OFFICE VISIT (OUTPATIENT)
Dept: MATERNAL FETAL MEDICINE | Facility: CLINIC | Age: 38
End: 2018-10-08
Attending: OBSTETRICS & GYNECOLOGY
Payer: COMMERCIAL

## 2018-10-08 VITALS
WEIGHT: 148 LBS | TEMPERATURE: 97.4 F | DIASTOLIC BLOOD PRESSURE: 70 MMHG | SYSTOLIC BLOOD PRESSURE: 130 MMHG | HEART RATE: 78 BPM | RESPIRATION RATE: 14 BRPM | HEIGHT: 62 IN | BODY MASS INDEX: 27.23 KG/M2 | OXYGEN SATURATION: 99 %

## 2018-10-08 DIAGNOSIS — O26.90 PREGNANCY RELATED CONDITION, ANTEPARTUM: ICD-10-CM

## 2018-10-08 DIAGNOSIS — O09.812 PREGNANCY RESULTING FROM IN VITRO FERTILIZATION IN SECOND TRIMESTER: ICD-10-CM

## 2018-10-08 DIAGNOSIS — J01.00 ACUTE NON-RECURRENT MAXILLARY SINUSITIS: Primary | ICD-10-CM

## 2018-10-08 DIAGNOSIS — O09.522 ELDERLY MULTIGRAVIDA IN SECOND TRIMESTER: ICD-10-CM

## 2018-10-08 DIAGNOSIS — O09.892 HISTORY OF PRETERM DELIVERY, CURRENTLY PREGNANT IN SECOND TRIMESTER: Primary | ICD-10-CM

## 2018-10-08 DIAGNOSIS — O09.812 PREGNANCY RESULTING FROM ASSISTED REPRODUCTIVE TECHNOLOGY IN SECOND TRIMESTER: ICD-10-CM

## 2018-10-08 PROCEDURE — 76811 OB US DETAILED SNGL FETUS: CPT

## 2018-10-08 PROCEDURE — 99213 OFFICE O/P EST LOW 20 MIN: CPT | Performed by: INTERNAL MEDICINE

## 2018-10-08 PROCEDURE — G0463 HOSPITAL OUTPT CLINIC VISIT: HCPCS

## 2018-10-08 PROCEDURE — 76817 TRANSVAGINAL US OBSTETRIC: CPT | Performed by: OBSTETRICS & GYNECOLOGY

## 2018-10-08 PROCEDURE — G0463 HOSPITAL OUTPT CLINIC VISIT: HCPCS | Mod: 25

## 2018-10-08 NOTE — MR AVS SNAPSHOT
After Visit Summary   10/8/2018    Sylvain Major    MRN: [id number]           Patient Information     Date Of Birth          1980        Visit Information        Provider Department      10/8/2018 9:15 AM Jaquan Black MD [facility]        Today's Diagnoses     History of  delivery, currently pregnant in second trimester    -  1    Pregnancy resulting from assisted reproductive technology in second trimester        Pregnancy resulting from in vitro fertilization in second trimester        Elderly multigravida in second trimester           Follow-ups after your visit        Your next 10 appointments already scheduled     Oct 08, 2018  6:40 PM CDT   Office Visit with Janine Barrett MD   [facility] ([facility])    [address] [place] [id number] [phone]           Bring a current list of meds and any records pertaining to this visit. For Physicals, please bring immunization records and any forms needing to be filled out. Please arrive 10 minutes early to complete paperwork.            Oct 25, 2018 10:15 AM CDT   MFM US OB TRANSVAGINAL with [id number]   [facility] ([facility])    [address] [place] [id number]   [phone]           Wear comfortable clothes and leave your valuables at home.            Oct 25, 2018 11:00 AM CDT   MFM READ SCREEN FETAL EHCO Ar[place]ondo with [id number]   [facility] ([facility])    [address] [place] [id number]   [phone]            Oct 25, 2018 11:30 AM CDT   Radiology MD with SH LESLEY CHRISTIAN   [facility] ([facility])    [address] [place] [id number]   [phone]           Please arrive at the time given  for your first appointment. This visit is used internally to schedule the physician's time during your ultrasound.              Future tests that were ordered for you today     Open Future Orders        Priority Expected Expires Ordered    MFM US OB Transvaginal Routine 10/25/2018 8/8/2019 10/8/2018    MFM Read Screen Fetal Echo Single Routine  8/8/2019 10/8/2018            Who to contact     If you have questions or need follow up information about today's clinic visit or your schedule please contact [facility] directly at [phone].  Normal or non-critical lab and imaging results will be communicated to you by Wortalhart, letter or phone within 4 business days after the clinic has received the results. If you do not hear from us within 7 days, please contact the clinic through [employer] or phone. If you have a critical or abnormal lab result, we will notify you by phone as soon as possible.  Submit refill requests through Maclear or call your pharmacy and they will forward the refill request to us. Please allow 3 business days for your refill to be completed.          Additional Information About Your Visit        MyChart Information     Maclear gives you secure access to your electronic health record. If you see a primary care provider, you can also send messages to your care team and make appointments. If you have questions, please call your primary care clinic.  If you do not have a primary care provider, please call [phone] and they will assist you.        Care EveryWhere ID     This is your Care EveryWhere ID. This could be used by other organizations to access your [place] medical records  [id number]        Your Vitals Were     Last Period                   05/29/2018            Blood Pressure from Last 3 Encounters:   06/02/18 110/70   04/13/18 126/85   02/27/18 128/83    Weight from Last 3 Encounters:   06/02/18 62.6 kg (138 lb)   04/13/18 62.6 kg (138 lb)   02/27/18  63.7 kg (140 lb 8 oz)               Primary Care Provider Office Phone # Fax #    Peter Quintero [phone]       [facility]ANNESSA [facility]LOYDFAGN [facility] [address] [facility] [address]  [facility] [address]        Equal Access to Services     JOVON MURILLO : Hadii aad ku hadasho Soomaali, waaxda luqadaha, qaybta kaalmada adeegyada, waxay citlaliin hayhelion donna fahadvikash kilpatrick . So [facility] [phone].    ATENCIÓN: Si habla español, tiene a galvez disposición servicios gratuitos de asistencia lingüística. Jerardofrancesca al [phone].    We comply with applicable federal civil rights laws and [place] laws. We do not discriminate on the basis of race, color, national origin, age, disability, sex, sexual orientation, or gender identity.            Thank you!     Thank you for choosing [facility]  for your care. Our goal is always to provide you with excellent care. Hearing back from our patients is one way we can continue to improve our services. Please take a few minutes to complete the written survey that you may receive in the mail after your visit with us. Thank you!             Your Updated Medication List - Protect others around you: Learn how to safely use, store and throw away your medicines at www.disposemymeds.org.          This list is accurate as of 10/8/18  1:16 PM.  Always use your most recent med list.                   Brand Name Dispense Instructions for use Diagnosis    ACE/ARB/ARNI NOT PRESCRIBED (INTENTIONAL)      ACE & ARB not prescribed due to Current Pregnancy        aspirin 81 MG EC tablet     90 tablet    Take 1 tablet (81 mg) by mouth daily    Type 2 diabetes mellitus without complication, without long-term current use of insulin (H)       BLOOD GLUCOSE TEST STRIPS Strp     100 each    1 strip by Lancet route daily    Type 2 diabetes mellitus without complication, without long-term current use of insulin (H)       cetirizine 10 MG tablet    zyrTEC    30 tablet    Take 1 tablet (10  mg) by mouth every evening    Allergic contact dermatitis, unspecified trigger, Itching       FISH OIL PO           insulin pen needle 32G X 4 MM    BD NANNETTE U/F    100 each    Use 1 pen needles daily or as directed.    Type 2 diabetes mellitus without complication, unspecified long term insulin use status       LEVEMIR FLEXPEN/FLEXTOUCH 100 UNIT/ML injection   Generic drug:  insulin detemir     15 mL    Inject 10 Units Subcutaneous every morning    Type 2 diabetes mellitus without complication, unspecified long term insulin use status       metFORMIN 1000 MG tablet    GLUCOPHAGE    180 tablet    Take 1 tablet (1,000 mg) by mouth 2 times daily (with meals)    Type 2 diabetes mellitus without complication, without long-term current use of insulin (H)       methylPREDNISolone 4 MG tablet    MEDROL DOSEPAK    21 tablet    Follow package instructions    Allergic contact dermatitis, unspecified trigger, Itching       prenatal multivitamin plus iron 27-0.8 MG Tabs per tablet      Take 1 tablet by mouth daily        SUMAtriptan 25 MG tablet    IMITREX    18 tablet    Take 1-2 tablets (25-50 mg) by mouth at onset of headache for migraine May repeat in 2 hours. Max 8 tablets/24 hours.    Migraine without status migrainosus, not intractable, unspecified migraine type       triamcinolone 0.1 % lotion    KENALOG    60 mL    Apply topically 3 times daily    Allergic contact dermatitis, unspecified trigger, Itching       vitamin D 2000 units tablet      Take 2,000 Units by mouth

## 2018-10-08 NOTE — MR AVS SNAPSHOT
After Visit Summary   10/8/2018    Sylvain Major    MRN: [id number]           Patient Information     Date Of Birth          1980        Visit Information        Provider Department      10/8/2018 6:40 PM Janine Barrett MD [facility]        Today's Diagnoses     Acute non-recurrent maxillary sinusitis    -  1       Follow-ups after your visit        Follow-up notes from your care team     Return in about 1 week (around 10/15/2018).      Your next 10 appointments already scheduled     Oct 25, 2018 10:15 AM CDT   MFM US OB TRANSVAGINAL with [id number]   [facility] ([facility])    [address] [place] [phone]   [phone]           Wear comfortable clothes and leave your valuables at home.            Oct 25, 2018 11:00 AM CDT   MFM READ SCREEN FETAL EHCO [place]redondo with [id number]   [facility] ([facility])    [address] [place] [phone]   [phone]            Oct 25, 2018 11:30 AM CDT   Radiology MD with  MFM MD   [facility] Maternal Fetal Medicine [facility] ([facility])    [address] [place] [phone]   [phone]           Please arrive at the time given for your first appointment. This visit is used internally to schedule the physician's time during your ultrasound.              Future tests that were ordered for you today     Open Future Orders        Priority Expected Expires Ordered    MFM US OB Transvaginal Routine 10/25/2018 8/8/2019 10/8/2018    MFM Read Screen Fetal Echo Single Routine  8/8/2019 10/8/2018            Who to contact     If you have questions or need follow up information about today's clinic visit or your schedule please contact [facility] directly at [phone].  Normal or non-critical lab and  "imaging results will be communicated to you by MyChart, letter or phone within 4 business days after the clinic has received the results. If you do not hear from us within 7 days, please contact the clinic through Proviation or phone. If you have a critical or abnormal lab result, we will notify you by phone as soon as possible.  Submit refill requests through Proviation or call your pharmacy and they will forward the refill request to us. Please allow 3 business days for your refill to be completed.          Additional Information About Your Visit        [employer]Pal Information     Proviation gives you secure access to your electronic health record. If you see a primary care provider, you can also send messages to your care team and make appointments. If you have questions, please call your primary care clinic.  If you do not have a primary care provider, please call [phone] and they will assist you.        Care EveryWhere ID     This is your Care EveryWhere ID. This could be used by other organizations to access your [facility] medical records  [id number]        Your Vitals Were     Pulse Temperature Respirations Height Last Period Pulse Oximetry    78 97.4  F (36.3  C) (Oral) 14 5' 2.25\" (1.581 m) 05/29/2018 99%    BMI (Body Mass Index)                   26.85 kg/m2            Blood Pressure from Last 3 Encounters:   10/08/18 130/70   06/02/18 110/70   04/13/18 126/85    Weight from Last 3 Encounters:   10/08/18 148 lb (67.1 kg)   06/02/18 138 lb (62.6 kg)   04/13/18 138 lb (62.6 kg)              Today, you had the following     No orders found for display         Today's Medication Changes          These changes are accurate as of 10/8/18  6:59 PM.  If you have any questions, ask your nurse or doctor.               Start taking these medicines.        Dose/Directions    amoxicillin-clavulanate 875-125 MG per tablet   Commonly known as:  AUGMENTIN   Used for:  Acute non-recurrent maxillary sinusitis   Started by:  Arnold " Janine Villagomez MD        Dose:  1 tablet   Take 1 tablet by mouth 2 times daily   Quantity:  10 tablet   Refills:  0            Where to get your medicines      These medications were sent to [facility] [id number]  [place], [place] - [address] AT [facility] [address]  [address], [place] [address]    Hours:  24-hours Phone:  [phone]     amoxicillin-clavulanate 875-125 MG per tablet                Primary Care Provider Office Phone # Fax #    Peter Quintero [phone]       [facility]ANN[facility]NORS [facility] [address]  [facility] [phone]        Equal Access to Services     JOVON MURILLO AH: Hadii aad celia hadasho Soomaali, waaxda luqadaha, qaybta kaalmada adeegyada, alina manrique. So [facility] [phone].    ATENCIÓN: Si habla español, tiene a galvez disposición servicios gratuitos de asistencia lingüística. [facility] [phone].    We comply with applicable federal civil rights laws and [place] laws. We do not discriminate on the basis of race, color, national origin, age, disability, sex, sexual orientation, or gender identity.            Thank you!     Thank you for choosing [facility]  for your care. Our goal is always to provide you with excellent care. Hearing back from our patients is one way we can continue to improve our services. Please take a few minutes to complete the written survey that you may receive in the mail after your visit with us. Thank you!             Your Updated Medication List - Protect others around you: Learn how to safely use, store and throw away your medicines at www.disposemymeds.org.          This list is accurate as of 10/8/18  6:59 PM.  Always use your most recent med list.                   Brand Name Dispense Instructions for use Diagnosis    ACE/ARB/ARNI NOT PRESCRIBED (INTENTIONAL)      ACE & ARB not prescribed due to Current Pregnancy        amoxicillin-clavulanate 875-125 MG per  tablet    AUGMENTIN    10 tablet    Take 1 tablet by mouth 2 times daily    Acute non-recurrent maxillary sinusitis       aspirin 81 MG EC tablet     90 tablet    Take 1 tablet (81 mg) by mouth daily    Type 2 diabetes mellitus without complication, without long-term current use of insulin (H)       BLOOD GLUCOSE TEST STRIPS Strp     100 each    1 strip by Lancet route daily    Type 2 diabetes mellitus without complication, without long-term current use of insulin (H)       cetirizine 10 MG tablet    zyrTEC    30 tablet    Take 1 tablet (10 mg) by mouth every evening    Allergic contact dermatitis, unspecified trigger, Itching       FISH OIL PO           insulin pen needle 32G X 4 MM    BD NANNETTE U/F    100 each    Use 1 pen needles daily or as directed.    Type 2 diabetes mellitus without complication, unspecified long term insulin use status       LEVEMIR FLEXPEN/FLEXTOUCH 100 UNIT/ML injection   Generic drug:  insulin detemir     15 mL    Inject 10 Units Subcutaneous every morning    Type 2 diabetes mellitus without complication, unspecified long term insulin use status       metFORMIN 1000 MG tablet    GLUCOPHAGE    180 tablet    Take 1 tablet (1,000 mg) by mouth 2 times daily (with meals)    Type 2 diabetes mellitus without complication, without long-term current use of insulin (H)       methylPREDNISolone 4 MG tablet    MEDROL DOSEPAK    21 tablet    Follow package instructions    Allergic contact dermatitis, unspecified trigger, Itching       prenatal multivitamin plus iron 27-0.8 MG Tabs per tablet      Take 1 tablet by mouth daily        SUMAtriptan 25 MG tablet    IMITREX    18 tablet    Take 1-2 tablets (25-50 mg) by mouth at onset of headache for migraine May repeat in 2 hours. Max 8 tablets/24 hours.    Migraine without status migrainosus, not intractable, unspecified migraine type       triamcinolone 0.1 % lotion    KENALOG    60 mL    Apply topically 3 times daily    Allergic contact dermatitis,  unspecified trigger, Itching       vitamin D 2000 units tablet      Take 2,000 Units by mouth

## 2018-10-08 NOTE — PROGRESS NOTES
Please see full imaging report from ViewPoint program under imaging tab.    Jaquan Black MD  Maternal Fetal Medicine    [facility] consultation    Dear Dr. Quintero    Thank you for requesting a consultation on Aaron regarding a history of PPROM, abruption and previable delivery of twins in a prior pregnancy as well as well controlled pregestational diabetes.    As you know she is a 38 year old,  with an IVF intrauterine pregnancy at 18 weeks 5 days.    I spent 20 minutes with Aaron and her  during today's visit, with > 50% of this time spent in face to face consultation and counseling regarding periviable pregnancy loss and diabetes.     OB history as follows:   2016 - pregnancy complicated by bleeding and diagnosis of IVF twin gestation. Subchorionic hemorrhage noted. PPROM at ~ 19 weeks with initial expectant management. Delivered at 21w5d with demise of both twins, and examination of placenta with suspected triple I, and abruption. Twin 1 delivered with initial attempt at expectant management after delivery (twin 1 was PPROM twin) but unsuccessful.  No dilated cervix noted at time of PPROM. Denies history of prior uterine procedures or cervical procedures.     PMH: Medical: type II diabetes. Followed by endocrinology. Most recent A1C = 6.1%. Denies end organ disease. Currently on novolog 11 units lunch, 6 units dinner, and NPH 30 units, as well as metformin 2000 mg.   Surgical: None. Allergies: NKA  FH: Strong family history of diabetes  SH: Supportive partner, denies ETOH, Tobacco or Drug use during the pregnancy. No history of substance use concerns.   ROS: No HEENT c/o of HA, visual/hearing disturbances, throat, nasal or mouth problems. No RESP  c/o of cough, wheezing or SOB. No CV c/o chest pain or palpitations. No GI c/o of N,V,D,C, dyspepsia. No  c/o hematuria, frequency, vaginal lesions, bleeding or discharge. No NEURO symptoms of seizures, weakness, numbness, tingling or sensory/motor loss. No  PSYCH c/o anxiety, depression or mood changes. No MS c/o of joint swelling or pain, muscle cramping/weakness. No HEME c/o bleeding or clotting disorders. No SKIN rashes, itching or sores. No c/o LYMPH node enlargement.    PHYSICAL EXAM: deferred  GENERAL: Well developed, gravid female.  US: TVUS - normal cervical length    IMPRESSION: 37 yo  at 18 weeks 5 days with prior previable delivery at 21 weeks after PPROM of twin 1 at 19 weeks and subsequent abruption and chorioamnionitis. Although 17-OHP has not been shown to be effective in twin pregnancy to reduce the risk of recurrent PPROM/PTB, data are limited on management of a longoria gestation with a history of a prior pregnancy loss after previable PPROM with twins. Aaron notes she did have a diagnosis of subchorionic hemorrhage in that pregnancy, which certainly may also affect the risk of PPROM. Regardless, she has a longoria pregnancy currently with a history of a pregnancy with PPROM, and therefore I do see some benefit in considering weekly 17OHP treatment, with minimal risk to the patient and pregnancy and potential benefit. I have recommended that she consider weekly 17OHP to start prior to 22 weeks and to continue until 36 weeks. We have contacted your office today with this recommendation as well. I have also recommended a follow up cervical length, which we will plan to do at the time of her fetal echo (for IVF) in ~ 2 weeks. We briefly discussed cervical cerclage placement if her cervix measures < 2.5 cm, which is not the case today.     RECOMMENDATIONS:  1. Start 17OHP weekly prior to 22 weeks and continue until 36 weeks. Patient is interested in home administration (did own IVF injections) and encouraged to discuss with Dr. Quintero's office - will likely depend on insurance coverage  2. Fetal echo for IVF and diabetes  3. Follow up cervical length in two weeks - will determine at that time if any additional cervical lengths are warranted  4.  Serial US for diabetes - growth every 4 weeks, twice weekly BPPs at 32 weeks, delivery at 39 weeks, sooner if complications. We assume that these follow up US will be performed with Dr. Quintero's office but we are happy to see her back if preferred.     Thank you for the courtesy of this consult,     Jaquan Black MD  Maternal Fetal Medicine

## 2018-10-08 NOTE — PROGRESS NOTES
SUBJECTIVE:   Sylvain Major is a 38 year old female who presents to clinic today for the following health issues:      ED/UC Followup:    Facility: [facility]  Date of visit: 10/04/18  Reason for visit: URI  Current Status: Unchanged        Developed URI symptoms a week ago. Julia is 19 weeks pregnant right now through IVF (she previously had intra-uterine demise of twins).     Taking Robitussin with Tylenol for her symptoms.      Problem list and histories reviewed & adjusted, as indicated.  Additional history: as documented    Patient Active Problem List   Diagnosis     CARDIOVASCULAR SCREENING; LDL GOAL LESS THAN 160      premature rupture of membranes (PPROM) delivered, current hospitalization     Insulin controlled gestational diabetes mellitus (GDM) in second trimester     Type 2 diabetes mellitus affecting pregnancy, antepartum     Dichorionic diamniotic twin gestation      premature rupture of membranes (PPROM) with unknown onset of labor     Oligohydramnios antepartum, second trimester, fetus 1     PROM (premature rupture of membranes)     Type 2 diabetes mellitus without complication, without long-term current use of insulin (H)     Hyperlipidemia LDL goal <70     Atypical chest pain     PCOS (polycystic ovarian syndrome)     History reviewed. No pertinent surgical history.    Social History   Substance Use Topics     Smoking status: Never Smoker     Smokeless tobacco: Never Used     Alcohol use 0.0 oz/week     0 Standard drinks or equivalent per week      Comment: occ     Family History   Problem Relation Age of Onset     HEART DISEASE Mother      Diabetes Father      Diabetes Paternal Grandmother      Diabetes Paternal Grandfather            Reviewed and updated as needed this visit by clinical staff       Reviewed and updated as needed this visit by Provider         ROS:  Constitutional, HEENT, cardiovascular, pulmonary, gi and gu systems are negative, except as  "otherwise noted.    OBJECTIVE:     /70 (BP Location: Right arm, Patient Position: Sitting, Cuff Size: Adult Regular)  Pulse 78  Temp 97.4  F (36.3  C) (Oral)  Resp 14  Ht 5' 2.25\" (1.581 m)  Wt 148 lb (67.1 kg)  LMP 05/29/2018  SpO2 99%  BMI 26.85 kg/m2  Body mass index is 26.85 kg/(m^2).  GENERAL: healthy, alert and no distress  HENT: ear canals and TM's normal, nasal mucosa edematous, oropharynx with post-nasal drainage, maxillary sinus tenderess.  NECK: no adenopathy  RESP: lungs clear to auscultation - no rales, rhonchi or wheezes  CV: regular rate and rhythm, normal S1 S2, no S3 or S4, no murmur, click or rub, no peripheral edema and peripheral pulses strong    Diagnostic Test Results:  none     ASSESSMENT/PLAN:     1. Acute non-recurrent maxillary sinusitis  Currently 19 weeks pregnant through IVF. Augmentin is safe in pregnancy. Will prescribe for 5 days. Continue with saline spray and Robitussin PRN.  - amoxicillin-clavulanate (AUGMENTIN) 875-125 MG per tablet; Take 1 tablet by mouth 2 times daily  Dispense: 10 tablet; Refill: 0    Follow up if no improvement in symptoms      Janine Barrett MD  [facility]  "

## 2018-10-08 NOTE — NURSING NOTE
[facility] called to inform that patient will need to start 17OHP injections within the next 2 weeks per Dr. Black.  Alcira, from [facility] given patient information and will pass along formal consultation to Dr. Quintero as soon as it's received from Dr. Black.  They will reach out to the patient and her insurance once Dr. Quintero can review consultation to start the process.

## 2018-10-25 ENCOUNTER — HOSPITAL ENCOUNTER (OUTPATIENT)
Dept: ULTRASOUND IMAGING | Facility: CLINIC | Age: 38
Discharge: HOME | End: 2018-10-25
Attending: OBSTETRICS & GYNECOLOGY | Admitting: OBSTETRICS & GYNECOLOGY
Payer: COMMERCIAL

## 2018-10-25 ENCOUNTER — OFFICE VISIT (OUTPATIENT)
Dept: MATERNAL FETAL MEDICINE | Facility: CLINIC | Age: 38
End: 2018-10-25
Attending: OBSTETRICS & GYNECOLOGY
Payer: COMMERCIAL

## 2018-10-25 DIAGNOSIS — O09.892 HISTORY OF PRETERM DELIVERY, CURRENTLY PREGNANT IN SECOND TRIMESTER: ICD-10-CM

## 2018-10-25 DIAGNOSIS — O24.119 TYPE 2 DIABETES MELLITUS AFFECTING PREGNANCY, ANTEPARTUM: ICD-10-CM

## 2018-10-25 DIAGNOSIS — O09.892 HISTORY OF PRETERM DELIVERY, CURRENTLY PREGNANT IN SECOND TRIMESTER: Primary | ICD-10-CM

## 2018-10-25 DIAGNOSIS — O09.812 PREGNANCY RESULTING FROM IN VITRO FERTILIZATION IN SECOND TRIMESTER: ICD-10-CM

## 2018-10-25 PROCEDURE — 76817 TRANSVAGINAL US OBSTETRIC: CPT

## 2018-10-25 NOTE — PROGRESS NOTES
"Please see \"Imaging\" tab under \"Chart Review\" for details of today's US.    aCrin Marr, DO    "

## 2018-10-25 NOTE — MR AVS SNAPSHOT
After Visit Summary   10/25/2018    Sylvain Major    MRN: [id number]           Patient Information     Date Of Birth          1980        Visit Information        Provider Department      10/25/2018 11:30 AM Carin Marr DO [facility] Maternal Fetal Medicine - [facility]        Today's Diagnoses     History of  delivery, currently pregnant in second trimester    -  1    Type 2 diabetes mellitus affecting pregnancy, antepartum        Pregnancy resulting from in vitro fertilization in second trimester           Follow-ups after your visit        Your next 10 appointments already scheduled     Oct 31, 2018 11:00 AM CDT   MFM US COMPRE SINGLE F/U with [id number]   [facility] Maternal Fetal Medicine Ultrasound - [place] ([facility])    [address]  [facility] [address] [phone]           Wear comfortable clothes and leave your valuables at home.            Oct 31, 2018 11:30 AM CDT   Radiology MD with UR M MD   [facility] Maternal Fetal Medicine - [facility])    [address]  [facility] [address] [phone]           Please arrive at the time given for your first appointment. This visit is used internally to schedule the physician's time during your ultrasound.            Oct 31, 2018 11:45 AM CDT   Ech Fetal Complete* with Hector, [id number]   [facility] Echo[facility]EKG ([facility])    [address] [place] [address]  [facility] [address]                 Future tests that were ordered for you today     Open Future Orders        Priority Expected Expires Ordered    MFM US Comprehensive Single F/U Routine 10/31/2018 10/25/2019 10/25/2018    Echo Fetal Complete-[facility] Routine  10/25/2019 10/25/2018            Who to contact     If you have questions or need follow up information about today's clinic visit or your schedule please contact [facility] MATERNAL  FETAL MEDICINE [facility] directly at [phone].  Normal or non-critical lab and imaging results will be communicated to you by MyChart, letter or phone within 4 business days after the clinic has received the results. If you do not hear from us within 7 days, please contact the clinic through Welloc[employer] or phone. If you have a critical or abnormal lab result, we will notify you by phone as soon as possible.  Submit refill requests through [employer] or call your pharmacy and they will forward the refill request to us. Please allow 3 business days for your refill to be completed.          Additional Information About Your Visit        WellocitiesharTopDeejays Information     [employer] gives you secure access to your electronic health record. If you see a primary care provider, you can also send messages to your care team and make appointments. If you have questions, please call your primary care clinic.  If you do not have a primary care provider, please call [phone] and they will assist you.        Care EveryWhere ID     This is your Care EveryWhere ID. This could be used by other organizations to access your [place] medical records  [id number]        Your Vitals Were     Last Period                   05/29/2018            Blood Pressure from Last 3 Encounters:   10/08/18 130/70   06/02/18 110/70   04/13/18 126/85    Weight from Last 3 Encounters:   10/08/18 67.1 kg (148 lb)   06/02/18 62.6 kg (138 lb)   04/13/18 62.6 kg (138 lb)               Primary Care Provider Office Phone # Fax #    Peter Quintero [phone]       [facility] [address]  [facility] [address]        Equal Access to Services     [facility] : Hadii aad celia hadashmemo Sobreanna, waaxda luqadaha, qaybta kaalmada belkis, alina manrique. So [facility] [phone].    ATENCIÓN: Si habla español, tiene a galvez disposición servicios gratuitos de asistencia lingüística. Llame al [phone].    We comply  with applicable federal civil rights laws and [place] laws. We do not discriminate on the basis of race, color, national origin, age, disability, sex, sexual orientation, or gender identity.            Thank you!     Thank you for choosing [facility]  for your care. Our goal is always to provide you with excellent care. Hearing back from our patients is one way we can continue to improve our services. Please take a few minutes to complete the written survey that you may receive in the mail after your visit with us. Thank you!             Your Updated Medication List - Protect others around you: Learn how to safely use, store and throw away your medicines at www.disposemymeds.org.          This list is accurate as of 10/25/18  5:16 PM.  Always use your most recent med list.                   Brand Name Dispense Instructions for use Diagnosis    ACE/ARB/ARNI NOT PRESCRIBED (INTENTIONAL)      ACE & ARB not prescribed due to Current Pregnancy        amoxicillin-clavulanate 875-125 MG per tablet    AUGMENTIN    10 tablet    Take 1 tablet by mouth 2 times daily    Acute non-recurrent maxillary sinusitis       aspirin 81 MG EC tablet     90 tablet    Take 1 tablet (81 mg) by mouth daily    Type 2 diabetes mellitus without complication, without long-term current use of insulin (H)       BLOOD GLUCOSE TEST STRIPS Strp     100 each    1 strip by Lancet route daily    Type 2 diabetes mellitus without complication, without long-term current use of insulin (H)       cetirizine 10 MG tablet    zyrTEC    30 tablet    Take 1 tablet (10 mg) by mouth every evening    Allergic contact dermatitis, unspecified trigger, Itching       FISH OIL PO           insulin pen needle 32G X 4 MM    BD NANNETTE U/F    100 each    Use 1 pen needles daily or as directed.    Type 2 diabetes mellitus without complication, unspecified long term insulin use status       LEVEMIR FLEXPEN/FLEXTOUCH 100 UNIT/ML injection    Generic drug:  insulin detemir     15 mL    Inject 10 Units Subcutaneous every morning    Type 2 diabetes mellitus without complication, unspecified long term insulin use status       metFORMIN 1000 MG tablet    GLUCOPHAGE    180 tablet    Take 1 tablet (1,000 mg) by mouth 2 times daily (with meals)    Type 2 diabetes mellitus without complication, without long-term current use of insulin (H)       methylPREDNISolone 4 MG tablet    MEDROL DOSEPAK    21 tablet    Follow package instructions    Allergic contact dermatitis, unspecified trigger, Itching       prenatal multivitamin plus iron 27-0.8 MG Tabs per tablet      Take 1 tablet by mouth daily        SUMAtriptan 25 MG tablet    IMITREX    18 tablet    Take 1-2 tablets (25-50 mg) by mouth at onset of headache for migraine May repeat in 2 hours. Max 8 tablets/24 hours.    Migraine without status migrainosus, not intractable, unspecified migraine type       triamcinolone 0.1 % lotion    KENALOG    60 mL    Apply topically 3 times daily    Allergic contact dermatitis, unspecified trigger, Itching       vitamin D 2000 units tablet      Take 2,000 Units by mouth

## 2018-10-31 ENCOUNTER — OFFICE VISIT (OUTPATIENT)
Dept: MATERNAL FETAL MEDICINE | Facility: CLINIC | Age: 38
End: 2018-10-31
Attending: OBSTETRICS & GYNECOLOGY
Payer: COMMERCIAL

## 2018-10-31 ENCOUNTER — HOSPITAL ENCOUNTER (OUTPATIENT)
Dept: CARDIOLOGY | Facility: CLINIC | Age: 38
End: 2018-10-31
Attending: OBSTETRICS & GYNECOLOGY
Payer: COMMERCIAL

## 2018-10-31 ENCOUNTER — HOSPITAL ENCOUNTER (OUTPATIENT)
Dept: ULTRASOUND IMAGING | Facility: CLINIC | Age: 38
Discharge: HOME | End: 2018-10-31
Attending: OBSTETRICS & GYNECOLOGY | Admitting: SOCIAL WORKER
Payer: COMMERCIAL

## 2018-10-31 DIAGNOSIS — O24.119 TYPE 2 DIABETES MELLITUS AFFECTING PREGNANCY, ANTEPARTUM: ICD-10-CM

## 2018-10-31 DIAGNOSIS — Z87.51 HISTORY OF PRETERM DELIVERY: ICD-10-CM

## 2018-10-31 DIAGNOSIS — O09.892 HISTORY OF PRETERM DELIVERY, CURRENTLY PREGNANT IN SECOND TRIMESTER: ICD-10-CM

## 2018-10-31 DIAGNOSIS — O09.812 PREGNANCY RESULTING FROM IN VITRO FERTILIZATION IN SECOND TRIMESTER: ICD-10-CM

## 2018-10-31 DIAGNOSIS — O09.522 ELDERLY MULTIGRAVIDA IN SECOND TRIMESTER: ICD-10-CM

## 2018-10-31 DIAGNOSIS — O24.119 TYPE 2 DIABETES MELLITUS AFFECTING PREGNANCY, ANTEPARTUM: Primary | ICD-10-CM

## 2018-10-31 PROCEDURE — 76816 OB US FOLLOW-UP PER FETUS: CPT

## 2018-10-31 PROCEDURE — 76825 ECHO EXAM OF FETAL HEART: CPT

## 2018-10-31 PROCEDURE — 76817 TRANSVAGINAL US OBSTETRIC: CPT | Performed by: OBSTETRICS & GYNECOLOGY

## 2018-10-31 NOTE — MR AVS SNAPSHOT
After Visit Summary   10/31/2018    Sylvain Major    MRN: [id number]           Patient Information     Date Of Birth          1980        Visit Information        Provider Department      10/31/2018 11:30 AM Day Gaona MD [facility] Maternal Fetal Medicine [facility]        Today's Diagnoses     Type 2 diabetes mellitus affecting pregnancy, antepartum    -  1    Pregnancy resulting from in vitro fertilization in second trimester        Elderly multigravida in second trimester        History of  delivery        History of  delivery, currently pregnant in second trimester           Follow-ups after your visit        Future tests that were ordered for you today     Open Future Orders        Priority Expected Expires Ordered    MFM US OB Transvaginal Routine  2019 10/31/2018            Who to contact     If you have questions or need follow up information about today's clinic visit or your schedule please contact [facility] MATERNAL FETAL MEDICINE [facility] directly at [phone].  Normal or non-critical lab and imaging results will be communicated to you by MyChart, letter or phone within 4 business days after the clinic has received the results. If you do not hear from us within 7 days, please contact the clinic through GemPhones[employer] or phone. If you have a critical or abnormal lab result, we will notify you by phone as soon as possible.  Submit refill requests through [employer] or call your pharmacy and they will forward the refill request to us. Please allow 3 business days for your refill to be completed.          Additional Information About Your Visit        MyChart Information     [employer] gives you secure access to your electronic health record. If you see a primary care provider, you can also send messages to your care team and make appointments. If you have questions, please call your primary care clinic.  If you do not have a primary care provider, please call  [phone] and they will assist you.        Care EveryWhere ID     This is your Care EveryWhere ID. This could be used by other organizations to access your [place] medical records  [id number]        Your Vitals Were     Last Period                   05/29/2018            Blood Pressure from Last 3 Encounters:   10/08/18 130/70   06/02/18 110/70   04/13/18 126/85    Weight from Last 3 Encounters:   10/08/18 67.1 kg (148 lb)   06/02/18 62.6 kg (138 lb)   04/13/18 62.6 kg (138 lb)               Primary Care Provider Office Phone # Fax #    Peter Quintero [phone]       [facility] [address]  [facility] [address]        Equal Access to Services     JOVON MURILLO : Hadii aad ku hadasho Soomaali, waaxda luqadaha, qaybta kaalmada adeegyada, alina kilpatrick . So [facility] [phone].    ATENCIÓN: Si habla español, tiene a galvez disposición servicios gratuitos de asistencia lingüística. Zelalem al [phone].    We comply with applicable federal civil rights laws and [place] laws. We do not discriminate on the basis of race, color, national origin, age, disability, sex, sexual orientation, or gender identity.            Thank you!     Thank you for choosing [facility]  for your care. Our goal is always to provide you with excellent care. Hearing back from our patients is one way we can continue to improve our services. Please take a few minutes to complete the written survey that you may receive in the mail after your visit with us. Thank you!             Your Updated Medication List - Protect others around you: Learn how to safely use, store and throw away your medicines at www.disposemymeds.org.          This list is accurate as of 10/31/18  1:08 PM.  Always use your most recent med list.                   Brand Name Dispense Instructions for use Diagnosis    ACE/ARB/ARNI NOT PRESCRIBED (INTENTIONAL)      ACE & ARB not  prescribed due to Current Pregnancy        amoxicillin-clavulanate 875-125 MG per tablet    AUGMENTIN    10 tablet    Take 1 tablet by mouth 2 times daily    Acute non-recurrent maxillary sinusitis       aspirin 81 MG EC tablet     90 tablet    Take 1 tablet (81 mg) by mouth daily    Type 2 diabetes mellitus without complication, without long-term current use of insulin (H)       BLOOD GLUCOSE TEST STRIPS Strp     100 each    1 strip by Lancet route daily    Type 2 diabetes mellitus without complication, without long-term current use of insulin (H)       cetirizine 10 MG tablet    zyrTEC    30 tablet    Take 1 tablet (10 mg) by mouth every evening    Allergic contact dermatitis, unspecified trigger, Itching       FISH OIL PO           insulin pen needle 32G X 4 MM    BD NANNETTE U/F    100 each    Use 1 pen needles daily or as directed.    Type 2 diabetes mellitus without complication, unspecified long term insulin use status       LEVEMIR FLEXPEN/FLEXTOUCH 100 UNIT/ML injection   Generic drug:  insulin detemir     15 mL    Inject 10 Units Subcutaneous every morning    Type 2 diabetes mellitus without complication, unspecified long term insulin use status       metFORMIN 1000 MG tablet    GLUCOPHAGE    180 tablet    Take 1 tablet (1,000 mg) by mouth 2 times daily (with meals)    Type 2 diabetes mellitus without complication, without long-term current use of insulin (H)       methylPREDNISolone 4 MG tablet    MEDROL DOSEPAK    21 tablet    Follow package instructions    Allergic contact dermatitis, unspecified trigger, Itching       prenatal multivitamin plus iron 27-0.8 MG Tabs per tablet      Take 1 tablet by mouth daily        SUMAtriptan 25 MG tablet    IMITREX    18 tablet    Take 1-2 tablets (25-50 mg) by mouth at onset of headache for migraine May repeat in 2 hours. Max 8 tablets/24 hours.    Migraine without status migrainosus, not intractable, unspecified migraine type       triamcinolone 0.1 % lotion    KENALOG     60 mL    Apply topically 3 times daily    Allergic contact dermatitis, unspecified trigger, Itching       vitamin D 2000 units tablet      Take 2,000 Units by mouth

## 2018-10-31 NOTE — CONSULTS
[facility]   Heart Center Fetal Consult Note    Patient:  Sylvain Major MRN:  [id number]   YOB: 1980 Age:  38 year old   Date of Visit:  10/31/2018 PCP:  Peter Quintero MD     Dear Dr. Marr:    I had the pleasure of seeing Sylvain Major at the [facility] on 10/31/2018 in fetal cardiology consultation today. She presented today accompanied by her . As you know, she is a 38 year old  at 22w0d who presented for fetal echocardiogram today because of maternal diabetes.    I performed and interpreted the fetal echocardiogram today, which demonstrated normal fetal cardiac anatomy, ventricular size and systolic function as well as normal heart rate and rhythm.     The  findings on today's exam were discussed. The routine limitations of fetal echocardiography were also reviewed, namely the inability to rule out small defects of the atrial or ventricular septum, coarctation of the aorta, minor valve abnormalities, or partial anomalous pulmonary venous return.     No additional fetal echocardiograms required, unless new concerns arise.    I appreciate the opportunity of participating in Sylvain Major 's care.    This visit was separate from the performance and interpretation of the ultrasound. The majority of the time (>50%) was spent in counseling and coordination of care. I spent 20 minutes in face-to-face time reviewing the above considerations.    Jaja Vasquez MD  [facility]  Pediatric Cardiology  [address] [place] [address], 5th floor, [facility] [address]  Phone # [phone]  Pager # [phone]  Fax [phone]

## 2018-10-31 NOTE — PROGRESS NOTES
Please see ultrasound report under imaging tab for details on ultrasound performed today.    Day Gaona MD  , OB/GYN  Maternal-Fetal Medicine  [email or web address]  [phone] (Academic office)  [phone] (Pager)

## 2018-11-12 ENCOUNTER — HOSPITAL ENCOUNTER (OUTPATIENT)
Facility: CLINIC | Age: 38
Discharge: HOME | End: 2018-11-13
Attending: OBSTETRICS & GYNECOLOGY | Admitting: OBSTETRICS & GYNECOLOGY
Payer: COMMERCIAL

## 2018-11-12 DIAGNOSIS — O34.32 CERVICAL INSUFFICIENCY DURING PREGNANCY IN SECOND TRIMESTER, ANTEPARTUM: Primary | ICD-10-CM

## 2018-11-12 DIAGNOSIS — O24.119 TYPE 2 DIABETES MELLITUS AFFECTING PREGNANCY, ANTEPARTUM: ICD-10-CM

## 2018-11-12 DIAGNOSIS — E11.9 TYPE 2 DIABETES MELLITUS WITHOUT COMPLICATION, WITHOUT LONG-TERM CURRENT USE OF INSULIN (H): ICD-10-CM

## 2018-11-12 LAB
ABO + RH BLD: NORMAL
ABO + RH BLD: NORMAL
ALBUMIN UR-MCNC: NEGATIVE MG/DL
APPEARANCE UR: CLEAR
BACTERIA #/AREA URNS HPF: ABNORMAL /HPF
BASOPHILS # BLD AUTO: 0 10E9/L (ref 0–0.2)
BASOPHILS NFR BLD AUTO: 0.1 %
BILIRUB UR QL STRIP: NEGATIVE
BLD GP AB SCN SERPL QL: NORMAL
BLOOD BANK CMNT PATIENT-IMP: NORMAL
COLOR UR AUTO: ABNORMAL
DIFFERENTIAL METHOD BLD: ABNORMAL
EOSINOPHIL # BLD AUTO: 0.1 10E9/L (ref 0–0.7)
EOSINOPHIL NFR BLD AUTO: 0.4 %
ERYTHROCYTE [DISTWIDTH] IN BLOOD BY AUTOMATED COUNT: 13.5 % (ref 10–15)
GLUCOSE BLDC GLUCOMTR-MCNC: 106 MG/DL (ref 70–99)
GLUCOSE BLDC GLUCOMTR-MCNC: 132 MG/DL (ref 70–99)
GLUCOSE BLDC GLUCOMTR-MCNC: 151 MG/DL (ref 70–99)
GLUCOSE BLDC GLUCOMTR-MCNC: 64 MG/DL (ref 70–99)
GLUCOSE UR STRIP-MCNC: NEGATIVE MG/DL
HBA1C MFR BLD: 4.9 % (ref 0–5.6)
HCT VFR BLD AUTO: 39.1 % (ref 35–47)
HGB BLD-MCNC: 13 G/DL (ref 11.7–15.7)
HGB UR QL STRIP: NEGATIVE
IMM GRANULOCYTES # BLD: 0.2 10E9/L (ref 0–0.4)
IMM GRANULOCYTES NFR BLD: 1.4 %
KETONES UR STRIP-MCNC: 80 MG/DL
LEUKOCYTE ESTERASE UR QL STRIP: NEGATIVE
LYMPHOCYTES # BLD AUTO: 3.5 10E9/L (ref 0.8–5.3)
LYMPHOCYTES NFR BLD AUTO: 25.9 %
MCH RBC QN AUTO: 30.7 PG (ref 26.5–33)
MCHC RBC AUTO-ENTMCNC: 33.2 G/DL (ref 31.5–36.5)
MCV RBC AUTO: 92 FL (ref 78–100)
MONOCYTES # BLD AUTO: 0.7 10E9/L (ref 0–1.3)
MONOCYTES NFR BLD AUTO: 5.2 %
MUCOUS THREADS #/AREA URNS LPF: PRESENT /LPF
NEUTROPHILS # BLD AUTO: 9 10E9/L (ref 1.6–8.3)
NEUTROPHILS NFR BLD AUTO: 67 %
NITRATE UR QL: NEGATIVE
NRBC # BLD AUTO: 0 10*3/UL
NRBC BLD AUTO-RTO: 0 /100
PH UR STRIP: 6 PH (ref 5–7)
PLATELET # BLD AUTO: 213 10E9/L (ref 150–450)
RBC # BLD AUTO: 4.23 10E12/L (ref 3.8–5.2)
RBC #/AREA URNS AUTO: 0 /HPF (ref 0–2)
SOURCE: ABNORMAL
SP GR UR STRIP: 1.01 (ref 1–1.03)
SPECIMEN EXP DATE BLD: NORMAL
SPECIMEN SOURCE: NORMAL
SQUAMOUS #/AREA URNS AUTO: 2 /HPF (ref 0–1)
UROBILINOGEN UR STRIP-MCNC: NORMAL MG/DL (ref 0–2)
WBC # BLD AUTO: 13.4 10E9/L (ref 4–11)
WBC #/AREA URNS AUTO: 1 /HPF (ref 0–5)
WET PREP SPEC: NORMAL

## 2018-11-12 PROCEDURE — 86850 RBC ANTIBODY SCREEN: CPT | Performed by: OBSTETRICS & GYNECOLOGY

## 2018-11-12 PROCEDURE — 83036 HEMOGLOBIN GLYCOSYLATED A1C: CPT | Performed by: OBSTETRICS & GYNECOLOGY

## 2018-11-12 PROCEDURE — 87591 N.GONORRHOEAE DNA AMP PROB: CPT | Performed by: OBSTETRICS & GYNECOLOGY

## 2018-11-12 PROCEDURE — 99233 SBSQ HOSP IP/OBS HIGH 50: CPT | Performed by: NURSE PRACTITIONER

## 2018-11-12 PROCEDURE — 25000128 H RX IP 250 OP 636: Performed by: OBSTETRICS & GYNECOLOGY

## 2018-11-12 PROCEDURE — 25000131 ZZH RX MED GY IP 250 OP 636 PS 637: Performed by: OBSTETRICS & GYNECOLOGY

## 2018-11-12 PROCEDURE — 87210 SMEAR WET MOUNT SALINE/INK: CPT | Performed by: OBSTETRICS & GYNECOLOGY

## 2018-11-12 PROCEDURE — 36415 COLL VENOUS BLD VENIPUNCTURE: CPT | Performed by: OBSTETRICS & GYNECOLOGY

## 2018-11-12 PROCEDURE — 12000028 ZZH R&B OB UMMC

## 2018-11-12 PROCEDURE — 25000132 ZZH RX MED GY IP 250 OP 250 PS 637: Performed by: OBSTETRICS & GYNECOLOGY

## 2018-11-12 PROCEDURE — 00000146 ZZHCL STATISTIC GLUCOSE BY METER IP

## 2018-11-12 PROCEDURE — G0463 HOSPITAL OUTPT CLINIC VISIT: HCPCS

## 2018-11-12 PROCEDURE — 86901 BLOOD TYPING SEROLOGIC RH(D): CPT | Performed by: OBSTETRICS & GYNECOLOGY

## 2018-11-12 PROCEDURE — 85025 COMPLETE CBC W/AUTO DIFF WBC: CPT | Performed by: OBSTETRICS & GYNECOLOGY

## 2018-11-12 PROCEDURE — 87653 STREP B DNA AMP PROBE: CPT | Performed by: OBSTETRICS & GYNECOLOGY

## 2018-11-12 PROCEDURE — 25000132 ZZH RX MED GY IP 250 OP 250 PS 637: Performed by: STUDENT IN AN ORGANIZED HEALTH CARE EDUCATION/TRAINING PROGRAM

## 2018-11-12 PROCEDURE — 86900 BLOOD TYPING SEROLOGIC ABO: CPT | Performed by: OBSTETRICS & GYNECOLOGY

## 2018-11-12 PROCEDURE — 81001 URINALYSIS AUTO W/SCOPE: CPT | Performed by: OBSTETRICS & GYNECOLOGY

## 2018-11-12 PROCEDURE — 87491 CHLMYD TRACH DNA AMP PROBE: CPT | Performed by: OBSTETRICS & GYNECOLOGY

## 2018-11-12 RX ORDER — NICOTINE POLACRILEX 4 MG
15-30 LOZENGE BUCCAL
Status: DISCONTINUED | OUTPATIENT
Start: 2018-11-12 | End: 2018-11-13 | Stop reason: HOSPADM

## 2018-11-12 RX ORDER — ONDANSETRON 2 MG/ML
4 INJECTION INTRAMUSCULAR; INTRAVENOUS EVERY 6 HOURS PRN
Status: DISCONTINUED | OUTPATIENT
Start: 2018-11-12 | End: 2018-11-13 | Stop reason: HOSPADM

## 2018-11-12 RX ORDER — BETAMETHASONE SODIUM PHOSPHATE AND BETAMETHASONE ACETATE 3; 3 MG/ML; MG/ML
12 INJECTION, SUSPENSION INTRA-ARTICULAR; INTRALESIONAL; INTRAMUSCULAR; SOFT TISSUE EVERY 24 HOURS
Status: DISCONTINUED | OUTPATIENT
Start: 2018-11-12 | End: 2018-11-13

## 2018-11-12 RX ORDER — DEXTROSE MONOHYDRATE 25 G/50ML
25-50 INJECTION, SOLUTION INTRAVENOUS
Status: DISCONTINUED | OUTPATIENT
Start: 2018-11-12 | End: 2018-11-13 | Stop reason: HOSPADM

## 2018-11-12 RX ORDER — DOCUSATE SODIUM 100 MG/1
100 CAPSULE, LIQUID FILLED ORAL 2 TIMES DAILY
Status: DISCONTINUED | OUTPATIENT
Start: 2018-11-12 | End: 2018-11-13 | Stop reason: HOSPADM

## 2018-11-12 RX ADMIN — PROGESTERONE 200 MG: 200 CAPSULE ORAL at 21:54

## 2018-11-12 RX ADMIN — Medication 12.5 MG: at 20:37

## 2018-11-12 RX ADMIN — BETAMETHASONE SODIUM PHOSPHATE AND BETAMETHASONE ACETATE 12 MG: 3; 3 INJECTION, SUSPENSION INTRA-ARTICULAR; INTRALESIONAL; INTRAMUSCULAR at 20:11

## 2018-11-12 RX ADMIN — DOCUSATE SODIUM 100 MG: 100 CAPSULE, LIQUID FILLED ORAL at 20:07

## 2018-11-12 RX ADMIN — INSULIN HUMAN 30 UNITS: 100 INJECTION, SUSPENSION SUBCUTANEOUS at 23:02

## 2018-11-12 RX ADMIN — METFORMIN HYDROCHLORIDE 1000 MG: 500 TABLET, FILM COATED ORAL at 20:34

## 2018-11-12 NOTE — H&P
[facility]  OB History and Physical      Sylvain Major MRN# [id number]   Age: 38 year old YOB: 1980     CC:  Cervical insuffiencey     HPI:  Ms. Sylvain Major is a 38 year old  at 23w5d by embryo transfer, who presented today to her clinic today for repeat TVUS for CL assessment given her history of  delivery in the previous pregnancy. Her cervix noted to be short with funeling (shortest residual length measurement was 18 mm). She previously had serial cervical length assessments and her cervix was approximately 3.6 cm and closed. Denies ctx, VB, LOF. Reports good FM.    Pregnancy Complications:  1.  OBH of IVF twin pregnancy in 2016 that was complicated with Previable PPROM at 19 weeks with fetal demise of twin A and delivery at twin A at 21w5d followed by delivery of twin B at 21w6d. Twin B was liveborn and lived approximately 4 hours. That pregnancy was complicated by subchorionic hemorrhage in the first trimester. Placental pathology showed triple I and abruption.  She has been followed by 1-2 weeks TVUS for CL in the current pregnancy. [place] was declined by her insurance.  2. T2DM. Diagnosed in 2017, currently on Metformin 1000 mg bid and Insulin novolog 11 u/lucnh, 16 unit(s)/dinner and NPH 30 unit(s) at bedtime.   3. AMA. Low risk NIPT, and MSAFP     Prenatal Labs:   Lab Results   Component Value Date    ABO O 2016    RH  Pos 2016    AS Neg 2016    HEPBANG Neg 2016    CHPCRT  10/17/2016     Negative   Negative for C. trachomatis rRNA by transcription mediated amplification.   A negative result by transcription mediated amplification does not preclude the   presence of C. trachomatis infection because results are dependent on proper   and adequate collection, absence of inhibitors, and sufficient rRNA to be   detected.      GCPCRT  10/17/2016     Negative   Negative for N. gonorrhoeae rRNA by transcription  mediated amplification.   A negative result by transcription mediated amplification does not preclude the   presence of N. gonorrhoeae infection because results are dependent on proper   and adequate collection, absence of inhibitors, and sufficient rRNA to be   detected.      HGB 13.1 2018       GBS Status:   Lab Results   Component Value Date    GBS  10/17/2016     Negative  No GBS DNA detected, presumed negative for GBS or number of bacteria may be   below the limit of detection of the assay.   Assay performed on incubated broth culture of specimen using [employer] real-time   PCR.         Ultrasounds  1. Normal anatomy US @18 weeks     OB History  Obstetric History       T0      L0     SAB0   TAB0   Ectopic0   Multiple0   Live Births1       # Outcome Date GA Lbr Jose/2nd Weight Sex Delivery Anes PTL Lv   2 Current            1  16 21w6d 05:13 / 00:24 0.4 kg (14.1 oz) M Vag-Breech IV [facility]ION[facility] Y ND      Name: SONIA SEN      Apgar1:  2                Apgar5: 1          PMHx:   Past Medical History:   Diagnosis Date     CARDIOVASCULAR SCREENING; LDL GOAL LESS THAN 160 2013     PSHx:   No past surgical history on file.  Meds:   Prescriptions Prior to Admission   Medication Sig Dispense Refill Last Dose     aspirin 81 MG EC tablet Take 1 tablet (81 mg) by mouth daily 90 tablet 3 Taking     cetirizine (ZYRTEC) 10 MG tablet Take 1 tablet (10 mg) by mouth every evening 30 tablet 1 Taking     Cholecalciferol (VITAMIN D) 2000 UNITS tablet Take 2,000 Units by mouth   Taking     NPH 30 units QHS        Novalog  11 units with lunch  6 units with dinner        metFORMIN (GLUCOPHAGE) 1000 MG tablet Take 1 tablet (1,000 mg) by mouth 2 times daily (with meals) 180 tablet 3 Taking     Omega-3 Fatty Acids (FISH OIL PO)    Taking     Prenatal Vit-Fe Fumarate-FA (PRENATAL MULTIVITAMIN PLUS IRON) 27-0.8 MG TABS per tablet Take 1 tablet by mouth daily   Taking     Allergies:  No Known  Allergies   FmHx:   Family History   Problem Relation Age of Onset     HEART DISEASE Mother      Diabetes Father      Diabetes Paternal Grandmother      Diabetes Paternal Grandfather      SocHx: She denies any tobacco, alcohol, or other drug use during this pregnancy.    ROS:   Complete 10-point ROS negative except as noted in HPI. She denies headache, blurry vision, chest pain, shortness of breath, RUQ pain, nausea, vomiting, dysuria, hematuria or extremity edema.    PE:  Vit: No data found.     Gen: Well-appearing, NAD, comfortable   CV: rrr, no mrg   Pulm: Ctab, no wheezes or crackles   Abd: Soft, gravid, non-tender, +BS   Ext: no LE edema b/l  Cx: SVE performed and cultures collected          FHT: Baseline 130, mod variability, + accelerations , no decelerations   [place]: no contractions noted    Assessment  Ms. Sylvain Major is a 38 year old , at 23w5d by embryo transfer dating, who presents with short cervix in the setting of previable PPROM of twin gestation in the prior pregnancy.    We had a thorough discussion with Aaron and her partner about options of management. We discussed vaginal progesterone and cervical cerclage. We discussed that a recent metanalysis done comparing both regimens in pregnancies with history of longoria pregnancy with PTB showed similar pregnancy and  outcomes. We discussed that given her prior pregnancy was twin gestation that it complicates the management decisions for the first pregnancy. Aaron had questions about our personal experience of both options outcomes in pregnancies with similar situation. We stated that our experience has been similar knowing that vaginal progesterone is newer form of treatment, but we have seen pregnancies make it to term and who end up delivering in few weeks with both options of management. We explained risks associated with cerclage placement including infection/bleeding/tissue injurry/PTL/PPROM.   We discussed steroids for  FLM, and the chance for one more course in in >1 weeks in case of imminent delivery. We discussed steroids effect on FS and chances of Insulin dose increment as a result of the expected hyperglycemia.  We will obtain both NICU and Endocrine consult while inpatient     Plan  # Cervical insuffiencey  - Start vaginal progesterone 200 mg at bedtime   - Betamethasone x2 q24 hr, 1st dose now  - Continuous uterine monitoring overnight   - Repeat TV US in am   - NICU consult    # T2DM  - Continue home dose Novolog and NPH (Novolog 11u /lunch, 6 unit(s)/dinner, and NPH 30 unit(s) bedtime)  - Novolog sliding scale   - FS checks 4 times daily  - Endocrine consult in the am  - ADA diet  - Repeat A1C now    # Fetal well being  - Fetal monitoring tid  - currently cat I with baseline 130, mod variability, + accel, no decel  - Will obtain recent prenatal records     The patient was discussed with Dr. Roy  who is in agreement with the treatment plan.    Eveline Garcia MD  Maternal-Fetal Medicine fellow  November 12, 2018     Physician Attestation   I, Arcelia Roy, saw this patient with the resident and agree with the resident/fellow's findings and plan of care as documented in the note.      I personally reviewed vital signs, medications, labs and imaging.    Key findings: The patient is known to me from the previous pregnancy. Prior pregnancy complicated by IVF conception of di-di twins with PPROM of fetus 1 at 19 weeks gestation. She was managed expectantly, but delivered fetus 1 stillborn at 21 5/7 weeks and fetus 2 was liveborn at 21 6/7 weeks gestation. She has been followed with serial cervical lengths in the current pregnancy and was noted to have cervical funneling with a shortened residual length of 1.8 cm today, which was a change of a cervical length of 3.6 cm two weeks prior. She denies any signs or symptoms of labor or infection. No concern for PPROM. We discussed at length the risks, benefits and alternatives  of vaginal progesterone supplementation versus placement of cervical cerclage. We discussed that cervical cerclage is only recommended through 23 6/7 weeks gestation so if vaginal progesterone fails and there is progressive cervical shortening that cervical cerclage would not be an option after that time. We discussed the regardless of the treatment, she is at high risk for  birth given both her history and the current shortened cervical length and therefore a course of betamethasone is recommended at this time. We will consult Endocrinology for assistance with glucose management while inpatient. Anticipate increasing insulin requirements with administration of betamethasone and may require insulin infusion.    After our discussion the couple are unsure if they desire placement of cerclage. We will plan to begin with vaginal progesterone supplementation 200 mg PV daily now and repeat the cervical length assessment in the morning. If there is progressive shortening then without evidence of  labor then cerclage may be the better option at that time. We will again review the findings and discuss the option of cervical cerclage tomorrow morning and if there continues to be no evidence of  labor or infection cervical cerclage can be performed tomorrow if the couple would opt to proceed. We will have Aaron remain NPO after 4 am in the event that she proceeds with cerclage placement.    Arcelia Roy MD  Date of Service (when I saw the patient): 18

## 2018-11-12 NOTE — PROGRESS NOTES
Brief Diabetes Management Team Note    We received consult to see Sylvain Major, for diabetes management in pregnancy.  Current regimen, per M team: NPH 30 units at bedtime, aspart 11 unit with lunch, 6 units with supper (nothing at bfast).  She will receive her first dose of betamethasone this evening.  She will be NPO at 4am for possible cerclage placement tomorrow morning.  Discussed with primary team and given that she will be getting beta tonight Dr. Roy is ok with continuing her home dose of NPH this evening.  MFM team has also ordered home doses of aspart and medium intensity correction.  Our team started a continuous prn order for the high intensity OB IV insulin infusion to start if BG >180.  Will order glucose checks for AC, 2 h post prandial, HS, 0200.    Full consult to follow tomorrow.  Briseyda Aponte [phone]-C [phone]  Diabetes Management Team Job Code [id number]

## 2018-11-12 NOTE — CONSULTS
_       [facility]  Neonatology Antepartum Consultation    Sylvain Major MRN# [id number]   YOB: 1980 Age: 38 year old   Date of Admission: 11/12/2018     Reason for consult: I was asked by Dr Roy to evaluate this patient for possible premature delivery.      I was asked to provide antepartum counseling for Sylvain Major at the request of Dr Roy secondary to possible premature delivery.  She is currently 23 5/7 weeks and has a history significant for cervical insuffiencey and IVF twin pregnancy in 2016 that was complicated with Previable PPROM at 19 weeks with fetal demise of twin A and delivery at twin A at 21w5d followed by delivery of twin B at 21w6d. She was admitted on 11/12/2018 for cervical insuffiencey. Betamethasone will be administered 11/12/18 & 11/13/18. Ms. Major, accompanied by her spouse, was counseled on the expected hospital course, potential risks, and outcomes associated with an infant born at approximately 23 5/7 weeks gestation. The counseling included: morbidity, mortality, initial delivery room stabilization, respiratory course, lung development, chronic lung disease, hemodynamic support, infection (including NEC), intraventricular hemorrhage, hyperbilirubinemia, retinopathy of prematurity, nutrition, growth and development, and long term outcomes. Please feel free to call with any additional questions or concerns.   Thank you for the consult request. It was a pleasure to speak with this nice couple.      Face to Face Time: 25 minutes  Floor Time: 10 minutes  Total Time: 35 minutes, in which great than 50% of the time was spent in direct patient consultation.    Kalpana CHENG CNP 11/12/2018 5:45 PM

## 2018-11-12 NOTE — IP AVS SNAPSHOT
[facility]    [address] [place] [address]    Phone:  [phone]                                       After Visit Summary   11/12/2018    Sylvain Major    MRN: [id number]           After Visit Summary Signature Page     I have received my discharge instructions, and my questions have been answered. I have discussed any challenges I see with this plan with the nurse or doctor.    ..........................................................................................................................................  Patient/Patient Representative Signature      ..........................................................................................................................................  Patient Representative Print Name and Relationship to Patient    ..................................................               ................................................  Date                                   Time    ..........................................................................................................................................  Reviewed by Signature/Title    ...................................................              ..............................................  Date                                               Time          22EPIC Rev 08/18

## 2018-11-12 NOTE — IP AVS SNAPSHOT
MRN:[id number]                      After Visit Summary   11/12/2018    Sylvain Major    MRN: [id number]           Thank you!     Thank you for choosing [facility] for your care. Our goal is always to provide you with excellent care. Hearing back from our patients is one way we can continue to improve our services. Please take a few minutes to complete the written survey that you may receive in the mail after you visit with us. Thank you!        Patient Information     Date Of Birth          1980        Designated Caregiver       Most Recent Value    Caregiver    Will someone help with your care after discharge? no    Name of designated caregiver none    Phone number of caregiver NA      About your hospital stay     You were admitted on:  November 12, 2018 You last received care in the:  [facility]    You were discharged on:  November 13, 2018        Reason for your hospital stay       You were admitted to monitor cervical length, give betamethasone and control blood glucose                  Who to Call     For medical emergencies, please call 911.  For non-urgent questions about your medical care, please call your primary care provider or clinic, [phone]          Attending Provider     Provider Arcelia Rasmussen MD OB/Gyn       Primary Care Provider Office Phone # Fax #    Janine Barrett [phone]       When to contact your care team       Discharge Instructions:  Call or present to labor and delivery if you experience:   -Regular painful contractions concerning for labor   -Leakage of fluid concerning for ruptured membranes   -Decreased fetal movement   -Bright red vaginal bleeding    -Headache, vision changes, upper abdominal pain, significant increase in swelling,   generalized unwell feeling                  After Care Instructions     Activity       Your activity upon discharge: May walk 30min at a time, lift up to 10lbs. Please move around  each day, do not recommend bedrest            Diet       Follow this diet upon discharge: Orders Placed This Encounter      Room Service      Consistent Carbohydrate Diet [id number] Calories: Moderate Consistent CHO (4-6 CHO units/meal)            Discharge Instructions       Insulin instructions:     While on Betamethasone, Tuesday and Wednesday  Breakfast: 3 units of Novolog  Lunch: 13 units of Novolog  Dinner: 8 units of Novolog  NPH 30 units at bedtime  Metformin 1000 mg twice daily  Test glucose before meals, 2 hours after meals, bedtime, 2 am and 5 am     Starting Thursday: resume PTA medications  Lunch: 11 units of Novolog  Dinner: 6 units of Novolog  NPH 30 units at bedtime  Metformin 1000 mg twice daily  Test blood sugars fasting, before meals, 2 hours after meals                  Follow-up Appointments     Follow Up and recommended labs and tests       Follow up with primary OB/Gyn and endocrinology as scheduled on Thursday 11/15                  Further instructions from your care team       Discharge Instruction for Undelivered Patients      You were seen for: Assessment of shortened cervix  We Consulted: Dr. Roy  You had (Test or Medicine):Ultrasound, fetal monitoring, betamethasone x2    Diet:   Drink 8 to 12 glasses of liquids (milk, juice, water) every day.  You may eat meals and snacks.     Activity:  Call your doctor or nurse midwife if your baby is moving less than usual.     Call your provider if you notice:  Swelling in your face or increased swelling in your hands or legs.  Headaches that are not relieved by Tylenol (acetaminophen).  Changes in your vision (blurring: seeing spots or stars.)  Nausea (sick to your stomach) and vomiting (throwing up).   Weight gain of 5 pounds or more per week.  Heartburn that doesn't go away.  Signs of bladder infection: pain when you urinate (use the toilet), need to go more often and more urgently.  The bag of bell (rupture of membranes) breaks, or you  notice leaking in your underwear.  Bright red blood in your underwear.  Abdominal (lower belly) or stomach pain.  For first baby: Contractions (tightening) less than 5 minutes apart for one hour or more.  Second (plus) baby: Contractions (tightening) less than 10 minutes apart and getting stronger.  *If less than 34 weeks: Contractions (tightenings) more than 6 times in one hour.  Increase or change in vaginal discharge (note the color and amount)      Follow-up:  As scheduled in the clinic          Pending Results     No orders found for last 3 day(s).            Statement of Approval     Ordered          11/13/18 9842  I have reviewed and agree with all the recommendations and orders detailed in this document.  EFFECTIVE NOW     Approved and electronically signed by:  Tata Guardado MD             Admission Information     Date & Time Provider Department Dept. Phone    11/12/2018 Arcelia Roy MD  4BOB [phone]      Your Vitals Were     Blood Pressure Temperature Respirations Last Period          114/69 98  F (36.7  C) (Oral) 16 05/29/2018        Open [facility] Information     [employer] gives you secure access to your electronic health record. If you see a primary care provider, you can also send messages to your care team and make appointments. If you have questions, please call your primary care clinic.  If you do not have a primary care provider, please call [phone] and they will assist you.        Care EveryWhere ID     This is your Care EveryWhere ID. This could be used by other organizations to access your [facility] medical records  [id number]        Equal Access to Services     ELMA MURILLO : [place] shepherd Sobreanna, waaxda luqadaha, qaybta kaalmada alina tamayo . So [facility] [phone].    ATENCIÓN: Si habla español, tiene a galvez disposición servicios gratuitos de asistencia lingüística. Llame al [phone].    We comply with applicable federal civil  rights laws and [place] laws. We do not discriminate on the basis of race, color, national origin, age, disability, sex, sexual orientation, or gender identity.               Review of your medicines      START taking        Dose / Directions    blood glucose calibration solution   Used for:  Type 2 diabetes mellitus affecting pregnancy, antepartum        Use to calibrate blood glucose monitor as directed.   Quantity:  1 each   Refills:  0       * insulin aspart 100 UNIT/ML injection   Commonly known as:  NovoLOG PEN   Used for:  Type 2 diabetes mellitus affecting pregnancy, antepartum        Dose:  8 Units   Inject 8 Units Subcutaneous daily (with dinner) Starting Friday, take normal dose (6units) again   Quantity:  3 mL   Refills:  0       * insulin aspart 100 UNIT/ML injection   Commonly known as:  NovoLOG PEN   Used for:  Type 2 diabetes mellitus without complication, without long-term current use of insulin (H)        Dose:  13 Units   Inject 13 Units Subcutaneous daily (with lunch) Starting Friday, take normal dose (11units) again   Quantity:  3 mL   Refills:  0       * insulin aspart 100 UNIT/ML injection   Commonly known as:  NovoLOG PEN   Used for:  Type 2 diabetes mellitus without complication, without long-term current use of insulin (H)        Dose:  3 Units   Start taking on:  11/14/2018   Inject 3 Units Subcutaneous daily (with breakfast) for 2 days   Quantity:  3 mL   Refills:  0       insulin isophane human 100 UNIT/ML injection   Commonly known as:  HumuLIN N PEN   Used for:  Type 2 diabetes mellitus affecting pregnancy, antepartum        Dose:  30 Units   Inject 30 Units Subcutaneous At Bedtime   Quantity:  3 mL   Refills:  0       progesterone 200 MG capsule   Commonly known as:  PROMETRIUM        Dose:  200 mg   Place 1 capsule (200 mg) vaginally daily   Quantity:  28 capsule   Refills:  0       * Notice:  This list has 3 medication(s) that are the same as other medications prescribed for you.  Read the directions carefully, and ask your doctor or other care provider to review them with you.      CONTINUE these medicines which have NOT CHANGED        Dose / Directions    ACE/ARB/ARNI NOT PRESCRIBED (INTENTIONAL)        ACE & ARB not prescribed due to Current Pregnancy   Refills:  0       aspirin 81 MG EC tablet   Used for:  Type 2 diabetes mellitus without complication, without long-term current use of insulin (H)        Dose:  81 mg   Take 1 tablet (81 mg) by mouth daily   Quantity:  90 tablet   Refills:  3       BLOOD GLUCOSE TEST STRIPS Strp   Used for:  Type 2 diabetes mellitus without complication, without long-term current use of insulin (H)        Dose:  1 strip   1 strip by Lancet route daily   Quantity:  100 each   Refills:  3       cetirizine 10 MG tablet   Commonly known as:  zyrTEC   Used for:  Allergic contact dermatitis, unspecified trigger, Itching        Dose:  10 mg   Take 1 tablet (10 mg) by mouth every evening   Quantity:  30 tablet   Refills:  1       FISH OIL PO        Refills:  0       insulin pen needle 32G X 4 MM   Commonly known as:  BD NANNETTE U/F   Used for:  Type 2 diabetes mellitus without complication, unspecified long term insulin use status        Use 1 pen needles daily or as directed.   Quantity:  100 each   Refills:  3       LEVEMIR FLEXPEN/FLEXTOUCH 100 UNIT/ML injection   Used for:  Type 2 diabetes mellitus without complication, unspecified long term insulin use status   Generic drug:  insulin detemir        Dose:  10 Units   Inject 10 Units Subcutaneous every morning   Quantity:  15 mL   Refills:  3       metFORMIN 1000 MG tablet   Commonly known as:  GLUCOPHAGE   Used for:  Type 2 diabetes mellitus without complication, without long-term current use of insulin (H)        Dose:  1000 mg   Take 1 tablet (1,000 mg) by mouth 2 times daily (with meals)   Quantity:  180 tablet   Refills:  3       prenatal multivitamin plus iron 27-0.8 MG Tabs per tablet        Dose:  1 tablet    Take 1 tablet by mouth daily   Refills:  0       SUMAtriptan 25 MG tablet   Commonly known as:  IMITREX   Used for:  Migraine without status migrainosus, not intractable, unspecified migraine type        Dose:  25-50 mg   Take 1-2 tablets (25-50 mg) by mouth at onset of headache for migraine May repeat in 2 hours. Max 8 tablets/24 hours.   Quantity:  18 tablet   Refills:  1       triamcinolone 0.1 % lotion   Commonly known as:  KENALOG   Used for:  Allergic contact dermatitis, unspecified trigger, Itching        Apply topically 3 times daily   Quantity:  60 mL   Refills:  0       vitamin D 2000 units tablet        Dose:  2000 Units   Take 2,000 Units by mouth   Refills:  0         STOP taking     methylPREDNISolone 4 MG tablet   Commonly known as:  MEDROL DOSEPAK                Where to get your medicines      These medications were sent to [facility] [place], [place] - [address]  [address] [facility] [address], [facility] [address]     Phone:  [phone]     blood glucose calibration solution    insulin aspart 100 UNIT/ML injection    insulin aspart 100 UNIT/ML injection    insulin aspart 100 UNIT/ML injection    insulin isophane human 100 UNIT/ML injection    progesterone 200 MG capsule                Protect others around you: Learn how to safely use, store and throw away your medicines at www.disposemymeds.org.             Medication List: This is a list of all your medications and when to take them. Check marks below indicate your daily home schedule. Keep this list as a reference.      Medications           Morning Afternoon Evening Bedtime As Needed    ACE/ARB/ARNI NOT PRESCRIBED (INTENTIONAL)   ACE & ARB not prescribed due to Current Pregnancy                                aspirin 81 MG EC tablet   Take 1 tablet (81 mg) by mouth daily                                blood glucose calibration solution   Use to calibrate blood glucose monitor as directed.                                BLOOD  GLUCOSE TEST STRIPS Strp   1 strip by Lancet route daily                                cetirizine 10 MG tablet   Commonly known as:  zyrTEC   Take 1 tablet (10 mg) by mouth every evening                                FISH OIL PO                                * insulin aspart 100 UNIT/ML injection   Commonly known as:  NovoLOG PEN   Inject 8 Units Subcutaneous daily (with dinner) Starting Friday, take normal dose (6units) again   Last time this was given:  11 Units on 11/13/2018  3:12 PM                                * insulin aspart 100 UNIT/ML injection   Commonly known as:  NovoLOG PEN   Inject 13 Units Subcutaneous daily (with lunch) Starting Friday, take normal dose (11units) again   Last time this was given:  11 Units on 11/13/2018  3:12 PM                                * insulin aspart 100 UNIT/ML injection   Commonly known as:  NovoLOG PEN   Inject 3 Units Subcutaneous daily (with breakfast) for 2 days   Start taking on:  11/14/2018   Last time this was given:  11 Units on 11/13/2018  3:12 PM                                insulin isophane human 100 UNIT/ML injection   Commonly known as:  HumuLIN N PEN   Inject 30 Units Subcutaneous At Bedtime   Last time this was given:  30 Units on 11/12/2018 11:02 PM                                insulin pen needle 32G X 4 MM   Commonly known as:  BD NANNETTE U/F   Use 1 pen needles daily or as directed.                                LEVEMIR FLEXPEN/FLEXTOUCH 100 UNIT/ML injection   Inject 10 Units Subcutaneous every morning   Generic drug:  insulin detemir                                metFORMIN 1000 MG tablet   Commonly known as:  GLUCOPHAGE   Take 1 tablet (1,000 mg) by mouth 2 times daily (with meals)   Last time this was given:  1,000 mg on 11/13/2018 10:38 AM                                prenatal multivitamin plus iron 27-0.8 MG Tabs per tablet   Take 1 tablet by mouth daily   Last time this was given:  1 tablet on 11/13/2018 10:35 AM                                 progesterone 200 MG capsule   Commonly known as:  PROMETRIUM   Place 1 capsule (200 mg) vaginally daily   Last time this was given:  200 mg on 11/12/2018  9:54 PM                                SUMAtriptan 25 MG tablet   Commonly known as:  IMITREX   Take 1-2 tablets (25-50 mg) by mouth at onset of headache for migraine May repeat in 2 hours. Max 8 tablets/24 hours.                                triamcinolone 0.1 % lotion   Commonly known as:  KENALOG   Apply topically 3 times daily                                vitamin D 2000 units tablet   Take 2,000 Units by mouth                                * Notice:  This list has 3 medication(s) that are the same as other medications prescribed for you. Read the directions carefully, and ask your doctor or other care provider to review them with you.

## 2018-11-13 ENCOUNTER — HOSPITAL ENCOUNTER (INPATIENT)
Dept: ULTRASOUND IMAGING | Facility: CLINIC | Age: 38
End: 2018-11-13
Payer: COMMERCIAL

## 2018-11-13 VITALS — DIASTOLIC BLOOD PRESSURE: 72 MMHG | SYSTOLIC BLOOD PRESSURE: 107 MMHG | RESPIRATION RATE: 18 BRPM | TEMPERATURE: 98.1 F

## 2018-11-13 PROBLEM — O34.31 CERVICAL INSUFFICIENCY DURING PREGNANCY IN FIRST TRIMESTER, ANTEPARTUM: Status: ACTIVE | Noted: 2018-11-13

## 2018-11-13 LAB
C TRACH DNA SPEC QL NAA+PROBE: ABNORMAL
GLUCOSE BLDC GLUCOMTR-MCNC: 100 MG/DL (ref 70–99)
GLUCOSE BLDC GLUCOMTR-MCNC: 104 MG/DL (ref 70–99)
GLUCOSE BLDC GLUCOMTR-MCNC: 118 MG/DL (ref 70–99)
GLUCOSE BLDC GLUCOMTR-MCNC: 130 MG/DL (ref 70–99)
GLUCOSE BLDC GLUCOMTR-MCNC: 152 MG/DL (ref 70–99)
GLUCOSE BLDC GLUCOMTR-MCNC: 179 MG/DL (ref 70–99)
GLUCOSE BLDC GLUCOMTR-MCNC: 189 MG/DL (ref 70–99)
GP B STREP DNA SPEC QL NAA+PROBE: NEGATIVE
N GONORRHOEA DNA SPEC QL NAA+PROBE: ABNORMAL
SPECIMEN SOURCE: ABNORMAL
SPECIMEN SOURCE: ABNORMAL
SPECIMEN SOURCE: NORMAL

## 2018-11-13 PROCEDURE — 76817 TRANSVAGINAL US OBSTETRIC: CPT | Performed by: OBSTETRICS & GYNECOLOGY

## 2018-11-13 PROCEDURE — 25000132 ZZH RX MED GY IP 250 OP 250 PS 637: Performed by: STUDENT IN AN ORGANIZED HEALTH CARE EDUCATION/TRAINING PROGRAM

## 2018-11-13 PROCEDURE — 00000146 ZZHCL STATISTIC GLUCOSE BY METER IP

## 2018-11-13 PROCEDURE — 25000128 H RX IP 250 OP 636: Performed by: STUDENT IN AN ORGANIZED HEALTH CARE EDUCATION/TRAINING PROGRAM

## 2018-11-13 PROCEDURE — 76816 OB US FOLLOW-UP PER FETUS: CPT

## 2018-11-13 PROCEDURE — 25000132 ZZH RX MED GY IP 250 OP 250 PS 637: Performed by: OBSTETRICS & GYNECOLOGY

## 2018-11-13 PROCEDURE — G0378 HOSPITAL OBSERVATION PER HR: HCPCS

## 2018-11-13 RX ORDER — CHLORAL HYDRATE 500 MG
1 CAPSULE ORAL DAILY
Status: DISCONTINUED | OUTPATIENT
Start: 2018-11-13 | End: 2018-11-13 | Stop reason: HOSPADM

## 2018-11-13 RX ORDER — PRENATAL VIT/IRON FUM/FOLIC AC 27MG-0.8MG
1 TABLET ORAL DAILY
Status: DISCONTINUED | OUTPATIENT
Start: 2018-11-13 | End: 2018-11-13 | Stop reason: HOSPADM

## 2018-11-13 RX ORDER — BETAMETHASONE SODIUM PHOSPHATE AND BETAMETHASONE ACETATE 3; 3 MG/ML; MG/ML
12 INJECTION, SUSPENSION INTRA-ARTICULAR; INTRALESIONAL; INTRAMUSCULAR; SOFT TISSUE EVERY 24 HOURS
Status: COMPLETED | OUTPATIENT
Start: 2018-11-13 | End: 2018-11-13

## 2018-11-13 RX ADMIN — DOCUSATE SODIUM 100 MG: 100 CAPSULE, LIQUID FILLED ORAL at 10:37

## 2018-11-13 RX ADMIN — METFORMIN HYDROCHLORIDE 1000 MG: 500 TABLET, FILM COATED ORAL at 10:38

## 2018-11-13 RX ADMIN — Medication 12.5 MG: at 10:38

## 2018-11-13 RX ADMIN — Medication 1 TABLET: at 10:35

## 2018-11-13 RX ADMIN — Medication 12.5 MG: at 15:13

## 2018-11-13 RX ADMIN — BETAMETHASONE SODIUM PHOSPHATE AND BETAMETHASONE ACETATE 12 MG: 3; 3 INJECTION, SUSPENSION INTRA-ARTICULAR; INTRALESIONAL; INTRAMUSCULAR at 18:19

## 2018-11-13 NOTE — PROGRESS NOTES
OB ANTEPARTUM PROGRESS NOTE      Subjective: Feeling well physically, was just very anxious overnight and did not sleep well. Spent time on Internet looking up what to do. Reports concerns regarding the risk for complications related to cerclage. Denies contractions this AM.      Objective:  /62  Temp 97.8  F (36.6  C) (Oral)  Resp 20  LMP 2018    Gen: lying in bed, NAD  Abd: nontender  Ext: no edema    FHT: , mod, no accels, possible prolonged decel to 80s but broken tracing, rare variable decels  [place]: quiet    Labs: TSH pending  Results for KENROY SEN (MRN [id number]) as of 2018 09:02   2018 22:59 2018 02:17 2018 03:57 2018 06:26   Glucose 151 (H) 189 (H) 179 (H) 152 (H)     Imaging:  Comprehensive US w/ TVUS (18): Cervical length 2.5cm with dynamic funneling. With vasalva 2.0cm. MVP 3.8cm. EFW 632g (44%ile), placenta anterior,     Assessment and Plan: Ms. Kenroy Sen is a 38 year old , at 23w6d by embryo transfer dating, who presents with short cervix in the setting of previable PPROM of twin gestation in the prior pregnancy.     We had a thorough discussion that given cervical length today would NOT recommend a cerclage. The risks of the procedure outweight the benefits at this point. DO recommend continued treatment with vaginal progesterone.     #Short cervix with history of mid-trimester pregnancy loss of twins : Repeat US this AM showed improved cervical length   - Cont vaginal progesterone 200 mg at bedtime   - Betamethasone x2 q24 hr, first dose  @ 2000  - S/p NICU consult     # T2DM: A1C 4.9%  - Continue home dose Novolog and NPH (Novolog 11u /lunch, 6 unit(s)/dinner, and NPH 30 unit(s) bedtime)  - Novolog sliding scale  - FS checks 4 times daily  - Endocrine consult this AM  - ADA diet     # Fetal well being  - Fetal monitoring tid  - currently appropriate for GA    #PNC:   - Rh pos, no rhogam indicated  -  Rubella immune, no MMR indicated  - GC/CT & GBS collected, pending  - will offer flu vaccine prior to discharge    Dispo:  - If BG well controlled today may discharge tonight after 2nd BMZ, otherwise possibly tomorrow    Patient seen and discussed with Dr Kirill Wu MD    Physician Attestation   I, Arcelia Roy, saw this patient with the resident and agree with the resident/fellow's findings and plan of care as documented in the note.      I personally reviewed vital signs, medications, labs and imaging.    Key findings: Cervical length improved this am.  We discussed that given the current gestational age, risks associated with cerclage, current cervical length improved and now 2.5 cm and recent study (metanalysis) demonstrating effectiveness of either cerclage or vaginal progesterone in reducing the risk for recurrent  birth that we would recommend continued vaginal progesterone and not proceeding with cerclage placement. Cerclage will not be offered this pregnancy given she will be over 24 weeks gestation tomorrow. Further cervical length assessments recommended if otherwise clinically indicated. Emphasized that bedrest not recommended, but may continue restricted activities as recommended by Dr. Quintero.    Endocrinology consult completed and plan for discharge home after second betamethasone injection this evening. She will follow up with Dr. Quintero and her Endocrinologist as scheduled on Thursday.    Arcelia Roy MD  Date of Service (when I saw the patient): 18

## 2018-11-13 NOTE — PROGRESS NOTES
"Diabetes/Hyperglycemia Management Consult    Chief Complaint Type 2 diabetic in pregnancy  Consult requested by: Dr. Garcia  History of Present Illness Sylvain \" Julia\" Moriah is a 38 year  at 23w5d by embryo transfer, pregnancy complicated by type 2 diabets, PCOS,  admitted for concern for cervical length and possible cerclage.    Inpatient diabetes team was consulted for type 2 diabetes in pregnancy given steroids.    Ms Major was initially dx with gestational diabetes in 2016 with her first pregnancy ( twin pregnancy secondary to IVF). PPROM at 19 weeks, with fetal demise  Of Twin A at 21w5d followed by delivery of Twin B at 21w6d. Twin B lived for a short period of time.  After her miscarriage, could not concentrate on her health. A1C  9.1% (2017) and as started on Metformin. Her dose of metformin increased to 1000 mg twice daily.   Ms. Major was able to decrease her A1C to approximately 6% prior to her current pregnancy. She was initially on Levemir and metformin, with reported above target blood sugars.  Was changed to novolog with lunch and dinner and NPH at bedtime with improvement in blood sugars. PTA medications as listed below.  Has been testing her blood sugars fasting, before meals, 2 hour post prandial, bedtime  Fasting glucose 80's to 90's  Reviewed her glucose trend on her phone, pre-meal glucose mostly < 110, 2 hour post prandial mostly 120 or less, occasional blood sugars above target( 133 to 144).  Is monitoring her diet and having 30 grams of CHO for breakfast and 60 grams of CHO for lunch and dinner. She will have a snack  1-2 times per day and try to keep the snack at 15 grams.    On day of admission,  Am glucose 86 ( per patient meter). Did not have lunch, glucose at 1653, 64. Did not receive her lunch novolog and was given treatment to increase glucose + ate a meal. Did have a later dinner with pre-meal glucose of 132 and a 2 hour post of 151.  Was given her " Metformin and bedtime NPH ( per RN, patient injected herself, insulin leaked out, did not get full dose of NPH)  Glucose at ~ 0200, 189 and fasting 152  Was NPO, glucose went from 152--> 104 ( without insulin)    Currently, denies fever, chills, chest pain, shortness of breath, abdominal pain, nausea and or vomiting. Denies contraction or leakage of fluid.Appetite is good.      Recent Labs  Lab 11/13/18  0626 11/13/18  0357 11/13/18  0217 11/12/18  2259 11/12/18  2004 11/12/18  1723   * 179* 189* 151* 132* 106*         Diabetes Type:   Type 2 Diabetes  Diabetes Duration: 2017, gestational diabetes in 2016  Usual Diabetes Regimen:   Metformin 1000 mg twice daily  Novolog 11 units with lunch and 6 units with dinner  NPH 30 units at bedtime    Ability to [place] Prescribed Regimen: yes  Diabetes Control:   Lab Results   Component Value Date    A1C 4.9 11/12/2018    A1C 6.1 04/13/2018    A1C 6.9 12/15/2017    A1C 9.1 09/13/2017    A1C 6.3 10/17/2016     Diabetes Complications: none  History of DKA: none  Able to Detect Hypoglycemia: has not experienced hypoglycemia  Usual Diabetes Care Provider: Dr. Philip, [facility]  Factors Impacting Glucose Control: pregnancy      Review of Systems  10 point ROS completed with pertinent positives and negatives noted in the HPI    Past medical, family and social histories are reviewed and updated.    Past Medical History  Past Medical History:   Diagnosis Date     CARDIOVASCULAR SCREENING; LDL GOAL LESS THAN 160 1/2/2013       Family History  Family History   Problem Relation Age of Onset     HEART DISEASE Mother      Diabetes Father      Diabetes Paternal Grandmother      Diabetes Paternal Grandfather        Social History  Social History     Social History     Marital status:      Spouse name: N/A     Number of children: N/A     Years of education: N/A     Social History Main Topics     Smoking status: Never Smoker     Smokeless  tobacco: Never Used     Alcohol use 0.0 oz/week     0 Standard drinks or equivalent per week      Comment: occ     Drug use: No     Sexual activity: Yes     Partners: Male     Other Topics Concern     Not on file     Social History Narrative         Physical Exam  /69  Temp 98  F (36.7  C) (Oral)  Resp 16  LMP 05/29/2018    General:  pleasant resting in bed, in no distress.   HEENT:  PER and anicteric, non-injected, oral mucous membranes moist.   Lungs: non-labored  ABD: gravid  Skin: warm and dry, no obvious lesions  MSK: moving all extremties  Mental status:  alert, oriented x3, communicating clearly  Psych:  calm, even mood    Laboratory  Recent Labs   Lab Test  04/13/18   1001  12/15/17   0752   NA  139  138   POTASSIUM  3.8  4.0   CHLORIDE  108  107   CO2  21  20   ANIONGAP  10  11   GLC  85  118*   BUN  8  13   CR  0.47*  0.47*   CARMELA  9.3  8.6     CBC RESULTS:   Recent Labs   Lab Test  11/12/18   1651   WBC  13.4*   RBC  4.23   HGB  13.0   HCT  39.1   MCV  92   MCH  30.7   MCHC  33.2   RDW  13.5   PLT  213       Liver Function Studies -   Recent Labs   Lab Test  12/15/17   0752   PROTTOTAL  7.5   ALBUMIN  3.9   BILITOTAL  0.4   ALKPHOS  68   AST  14   ALT  24       Active Medications  Current Facility-Administered Medications   Medication     betamethasone acet & sod phos (CELESTONE) injection 12 mg     glucose gel 15-30 g    Or     dextrose 50 % injection 25-50 mL    Or     glucagon injection 1 mg     docusate sodium (COLACE) capsule 100 mg     doxylamine (UNISOM) half-tab 12.5 mg     insulin 1 unit/mL in saline (novoLIN-Regular) drip - High Intensity Infusion     insulin aspart (NovoLOG) inj (RAPID ACTING)     insulin aspart (NovoLOG) inj (RAPID ACTING)     insulin aspart (NovoLOG) inj (RAPID ACTING)     insulin aspart (NovoLOG) inj (RAPID ACTING)     insulin isophane human (HumuLIN N PEN) injection 30 Units     metFORMIN (GLUCOPHAGE) tablet 1,000 mg     No Tdap Needed - Assessment: Patient does not  "need Tdap vaccine     ondansetron (ZOFRAN) injection 4 mg     progesterone (PROMETRIUM) capsule 200 mg     No current outpatient prescriptions on file.       Current Diet    Active Diet Order      NPO per Anesthesia Guidelines for Procedure/Surgery Except for: Meds      Assessment:  Sylvain \" Julia\" Moriah is a 38 year  at 23w5d by embryo transfer, pregnancy complicated by type 2 diabets, PCOS,  admitted for concern for cervical length and possible cerclage.      Type 2 diabetic: in good control, started on betamethasone 12 mg for two doses, first dose on  and second dose . Ms. Major has a good understanding of how to  manage her diabetes and would prefer to discharge due to decrease amount of PTO. She has a follow-up appointment with  her Endocrinologist for Thursday, November 15 scheduled and is able to send her her blood sugar readings.  If ok per primary team, ok to discharge per diabetes team.    Plan  While hospitalized  Breakfast: 3 units of Novolog  Lunch: 11 units of Novolog  Dinner: 6 units of novolog  metformin 1000 mg twice daily  Novolog medium intensity sliding scale before meals and at bedtime ( do not send home)  -NPH 30 units at bedtime    While on Betamethasone, Tuesday and Wednesday  Breakfast: 3 units of Novolog  Lunch: 13 units of Novolog  Dinner: 8 units of Novolog  NPH 30 units at bedtime  Metformin 1000 mg twice dialy  Test glucose before meals, 2 hours after meals, bedtime, 2 am and 5 am    Starting Thursday: resume PTA medications  Lunch: 11 units of Novolog  Dinner: 6 units of Novolog  NPH 30 units at bedtime  Metformin 1000 mg twice daily  Test blood sugars fating, before meals, 2 hours after meals    Taina Brunson, TRISTAN [phone]    Diabetes Management Team job code: [id number]    Time Spent on this Encounter   I spent 80 minutes on the unit/floor managing the care of Sylvain Major. Over 50% of my time was spent counseling the patient and/or coordinating " care regarding services listed in this note.

## 2018-11-13 NOTE — PLAN OF CARE
Data: Patient presented to [facility] at 1430.   Reason for maternal/fetal assessment per patient is funneling.    Patient is a . Prenatal record reviewed.      Obstetric History       T0      L0     SAB0   TAB0   Ectopic0   Multiple0   Live Births1       # Outcome Date GA Lbr Jose/2nd Weight Sex Delivery Anes PTL Lv   2 Current            1  16 21w6d 05:13 / 00:24 0.4 kg (14.1 oz) M Vag-Breech IV REGIONAL Y ND      Name: SONIA SEN      Apgar1:  2                Apgar5: 1      . Medical history:   Past Medical History:   Diagnosis Date     CARDIOVASCULAR SCREENING; LDL GOAL LESS THAN 160 2013   . Gestational Age 23w5d. VSS. Fetal movement present. Patient denies cramping, backache, vaginal discharge, pelvic pressure, UTI symptoms, GI problems, bloody show, vaginal bleeding, edema, headache, visual disturbances, epigastric or URQ pain, abdominal pain, rupture of membranes. Support persons  present.  Action: Verbal consent for EFM. Triage assessment completed. EFM applied. Uterine assessment no contractions. Fetal assessment: Presumed adequate fetal oxygenation documented (see flow record).   Response: Dr. Roy informed of arrival. Plan per provider is continuous toco monitoring, assessment of FHT, and to make a plan for tomorrow after ultrasound. Patient verbalized agreement with plan. Patient transferred to room 422 ambulatory, oriented to room and call light.

## 2018-11-13 NOTE — PLAN OF CARE
Problem: Patient Care Overview  Goal: Plan of Care/Patient Progress Review  Outcome: No Change  Patient's blood sugars have been elevated throughout the night. Highest blood sugar at 0200 was 189. Recheck was 179 and insulin gtt was not initiated. Patient continued on continuous toco throughout the night and there were not contractions noted and patient denied feeling any contractions. Patient's pulse has been elevated (110-120). Patient has been NPO since 0400 for possible cerclage placement this am after ultrasound. Patient very anxious and tearful with the unknown of what may happen. FHR monitor applied. No other concerns at this time.

## 2018-11-13 NOTE — CONSULTS
"Diabetes/Hyperglycemia Management Consult    Chief Complaint Type 2 diabetic in pregnancy  Consult requested by: Dr. Garcia  History of Present Illness Sylvain \" Julia\" Moriah is a 38 year  at 23w5d by embryo transfer, pregnancy complicated by type 2 diabets, PCOS,  admitted for concern for cervical length and possible cerclage.    Inpatient diabetes team was consulted for type 2 diabetes in pregnancy given steroids.    Ms Major was initially dx with gestational diabetes in 2016 with her first pregnancy ( twin pregnancy secondary to IVF). PPROM at 19 weeks, with fetal demise  Of Twin A at 21w5d followed by delivery of Twin B at 21w6d. Twin B lived for a short period of time.  After her miscarriage, could not concentrate on her health. A1C  9.1% (2017) and as started on Metformin. Her dose of metformin increased to 1000 mg twice daily.   Ms. Major was able to decrease her A1C to approximately 6% prior to her current pregnancy. She was initially on Levemir and metformin, with reported above target blood sugars.  Was changed to novolog with lunch and dinner and NPH at bedtime with improvement in blood sugars. PTA medications as listed below.  Has been testing her blood sugars fasting, before meals, 2 hour post prandial, bedtime  Fasting glucose 80's to 90's  Reviewed her glucose trend on her phone, pre-meal glucose mostly < 110, 2 hour post prandial mostly 120 or less, occasional blood sugars above target( 133 to 144).  Is monitoring her diet and having 30 grams of CHO for breakfast and 60 grams of CHO for lunch and dinner. She will have a snack  1-2 times per day and try to keep the snack at 15 grams.    On day of admission,  Am glucose 86 ( per patient meter). Did not have lunch, glucose at 1653, 64. Did not receive her lunch novolog and was given treatment to increase glucose + ate a meal. Did have a later dinner with pre-meal glucose of 132 and a 2 hour post of 151.  Was given her " Metformin and bedtime NPH ( per RN, patient injected herself, insulin leaked out, did not get full dose of NPH)  Glucose at ~ 0200, 189 and fasting 152  Was NPO, glucose went from 152--> 104 ( without insulin)    Currently, denies fever, chills, chest pain, shortness of breath, abdominal pain, nausea and or vomiting. Denies contraction or leakage of fluid.Appetite is good.      Recent Labs  Lab 11/13/18  0626 11/13/18  0357 11/13/18  0217 11/12/18  2259 11/12/18  2004 11/12/18  1723   * 179* 189* 151* 132* 106*         Diabetes Type:   Type 2 Diabetes  Diabetes Duration: 2017, gestational diabetes in 2016  Usual Diabetes Regimen:   Metformin 1000 mg twice daily  Novolog 11 units with lunch and 6 units with dinner  NPH 30 units at bedtime    Ability to [place] Prescribed Regimen: yes  Diabetes Control:   Lab Results   Component Value Date    A1C 4.9 11/12/2018    A1C 6.1 04/13/2018    A1C 6.9 12/15/2017    A1C 9.1 09/13/2017    A1C 6.3 10/17/2016     Diabetes Complications: none  History of DKA: none  Able to Detect Hypoglycemia: has not experienced hypoglycemia  Usual Diabetes Care Provider: Dr. Philip, [facility]  Factors Impacting Glucose Control: pregnancy      Review of Systems  10 point ROS completed with pertinent positives and negatives noted in the HPI    Past medical, family and social histories are reviewed and updated.    Past Medical History  Past Medical History:   Diagnosis Date     CARDIOVASCULAR SCREENING; LDL GOAL LESS THAN 160 1/2/2013       Family History  Family History   Problem Relation Age of Onset     HEART DISEASE Mother      Diabetes Father      Diabetes Paternal Grandmother      Diabetes Paternal Grandfather        Social History  Social History     Social History     Marital status:      Spouse name: N/A     Number of children: N/A     Years of education: N/A     Social History Main Topics     Smoking status: Never Smoker     Smokeless  tobacco: Never Used     Alcohol use 0.0 oz/week     0 Standard drinks or equivalent per week      Comment: occ     Drug use: No     Sexual activity: Yes     Partners: Male     Other Topics Concern     Not on file     Social History Narrative         Physical Exam  /69  Temp 98  F (36.7  C) (Oral)  Resp 16  LMP 05/29/2018    General:  pleasant resting in bed, in no distress.   HEENT:  PER and anicteric, non-injected, oral mucous membranes moist.   Lungs: non-labored  ABD: gravid  Skin: warm and dry, no obvious lesions  MSK: moving all extremties  Mental status:  alert, oriented x3, communicating clearly  Psych:  calm, even mood    Laboratory  Recent Labs   Lab Test  04/13/18   1001  12/15/17   0752   NA  139  138   POTASSIUM  3.8  4.0   CHLORIDE  108  107   CO2  21  20   ANIONGAP  10  11   GLC  85  118*   BUN  8  13   CR  0.47*  0.47*   CARMELA  9.3  8.6     CBC RESULTS:   Recent Labs   Lab Test  11/12/18   1651   WBC  13.4*   RBC  4.23   HGB  13.0   HCT  39.1   MCV  92   MCH  30.7   MCHC  33.2   RDW  13.5   PLT  213       Liver Function Studies -   Recent Labs   Lab Test  12/15/17   0752   PROTTOTAL  7.5   ALBUMIN  3.9   BILITOTAL  0.4   ALKPHOS  68   AST  14   ALT  24       Active Medications  Current Facility-Administered Medications   Medication     betamethasone acet & sod phos (CELESTONE) injection 12 mg     glucose gel 15-30 g    Or     dextrose 50 % injection 25-50 mL    Or     glucagon injection 1 mg     docusate sodium (COLACE) capsule 100 mg     doxylamine (UNISOM) half-tab 12.5 mg     insulin 1 unit/mL in saline (novoLIN-Regular) drip - High Intensity Infusion     insulin aspart (NovoLOG) inj (RAPID ACTING)     insulin aspart (NovoLOG) inj (RAPID ACTING)     insulin aspart (NovoLOG) inj (RAPID ACTING)     insulin aspart (NovoLOG) inj (RAPID ACTING)     insulin isophane human (HumuLIN N PEN) injection 30 Units     metFORMIN (GLUCOPHAGE) tablet 1,000 mg     No Tdap Needed - Assessment: Patient does not  "need Tdap vaccine     ondansetron (ZOFRAN) injection 4 mg     progesterone (PROMETRIUM) capsule 200 mg     No current outpatient prescriptions on file.       Current Diet    Active Diet Order      NPO per Anesthesia Guidelines for Procedure/Surgery Except for: Meds      Assessment:  Sylvain \" Julia\" Moriah is a 38 year  at 23w5d by embryo transfer, pregnancy complicated by type 2 diabets, PCOS,  admitted for concern for cervical length and possible cerclage.      Type 2 diabetic: in good control, started on betamethasone 12 mg for two doses, first dose on  and second dose . Ms. Major has a good understanding of how to  manage her diabetes and would prefer to discharge due to decrease amount of PTO. She has a follow-up appointment with  her Endocrinologist for Thursday, November 15 scheduled and is able to send her her blood sugar readings.  If ok per primary team, ok to discharge per diabetes team.    Plan  While hospitalized  Breakfast: 3 units of Novolog  Lunch: 11 units of Novolog  Dinner: 6 units of novolog  metformin 1000 mg twice daily  Novolog medium intensity sliding scale before meals and at bedtime ( do not send home)  -NPH 30 units at bedtime    While on Betamethasone, Tuesday and Wednesday  Breakfast: 3 units of Novolog  Lunch: 13 units of Novolog  Dinner: 8 units of Novolog  NPH 30 units at bedtime  Metformin 1000 mg twice dialy  Test glucose before meals, 2 hours after meals, bedtime, 2 am and 5 am    Starting Thursday: resume PTA medications  Lunch: 11 units of Novolog  Dinner: 6 units of Novolog  NPH 30 units at bedtime  Metformin 1000 mg twice daily  Test blood sugars fating, before meals, 2 hours after meals    Taina Brunson, TRISTAN [phone]    Diabetes Management Team job code: [id number]    Time Spent on this Encounter   I spent 80 minutes on the unit/floor managing the care of Sylvain Major. Over 50% of my time was spent counseling the patient and/or coordinating " care regarding services listed in this note.

## 2018-11-13 NOTE — CONSULTS
"[facility]  MATERNAL CHILD HEALTH SOCIAL WORK NICU PROGRESS NOTE    DATA:     Reason for Social Work Consult: SW consulted re: \"antepartum psychosocial assessment.\" SW met with pt and her spouse at bedside to assess needs and offer support.    Presenting Information: Pt is Sylvain \"Aaron\" Moriah ( 1980). She is a 37 y/o  at 23w5d by embryo transfer.    Pt family familiar to this SW from previous hospitalization and twin pregnancy loss in 2016 due to previable PPROM at 19 weeks with fetal demise of twin A at 21w5d, followed by delivery and demise of twin B at 21w6d.    Living Situation: Pt and her spouse, Laura Major, reside in [place], [place].    Social Support: Pt identifies her partner as her primary social support.    Transportation: Family report having reliable access to personal transportation.    Employment: Pt is employed full-time by [employer]. Pt has updated her employer re: this unexpected admission. She reports having a supportive workplace and denied stressors related to coordinating her time away.    Insurance: Pt is insured through her employer insurance, [employer]. Pt shared with SW that her employer has a benefit plan that has provided financial assistance for fertility treatment.    Finances: Family denied financial concerns at this time.     Mental Health History/Current Coping: Pt denies a significant mental health history. Pt endorses mostly stable mood now and throughout her pregnancy. She did endorse some mild anxiety related to this unexpected hospitalization. SW and pt discussed pattern of coping and the importance of having good supports.    INTERVENTION:       Chart review.     Re-introduction to Maternal Child Health  role and scope of practice.    SW completed chart review and collaborated with the multidisciplinary team.     Completed brief psychosocial assessment.     Provided [facility] ECHO business " card.    Reviewed Hospital and Community Resources     Assessed Mental Health History and Current Symptoms    Discussed pattern of coping, coping skills.    Identified stressors, barriers and family concerns.    Provided supportive counseling. Active empathetic listening and validation.     ASSESSMENT:     Affect: appropriate  Mood: bright  Coping: adequate    Couple appear to have good coping skills and good social support to help them manage and process through the feelings and emotions associated with this unexpected hospitalization.    Motivation/Ability to access services: Highly motivated, independent in accessing services and needed resources for support.     Assessment of parental risk for PMAD: Pt denies any mental health history or concerns. No signs or symptoms of depression noted. Pt at higher than average risk given pregnancy complications, history of pregnancy loss.    Assessment of Support System: stable, involved, supportive    Level of engagement with SW: Pt is bright and articulate and easily engages. Couple appeared open to and appreciative of ongoing therapeutic support, advocacy, and connection with resources. They are able to seek out SW when needs arise.     Family s understanding of medical situation: Couple appear to have a good grasp of the medical situation.    Strengths: caring family, willingness to accept help, good support network, engaged with treatment planning    Identified Barriers: None at this time.    PLAN:     SW will continue to assess needs and provide ongoing psychosocial support and access to appropriate resources/referrals. SW will continue to collaborate with the multidisciplinary team.    ADAMS Calix, [facility]  Clinical   Maternal Child Health  [facility]  Phone:   [phone]  Pager:    [phone]

## 2018-11-13 NOTE — DISCHARGE INSTRUCTIONS
Discharge Instruction for Undelivered Patients      You were seen for: Assessment of shortened cervix  We Consulted: Dr. Roy  You had (Test or Medicine):Ultrasound, fetal monitoring, betamethasone x2    Diet:   Drink 8 to 12 glasses of liquids (milk, juice, water) every day.  You may eat meals and snacks.     Activity:  Call your doctor or nurse midwife if your baby is moving less than usual.     Call your provider if you notice:  Swelling in your face or increased swelling in your hands or legs.  Headaches that are not relieved by Tylenol (acetaminophen).  Changes in your vision (blurring: seeing spots or stars.)  Nausea (sick to your stomach) and vomiting (throwing up).   Weight gain of 5 pounds or more per week.  Heartburn that doesn't go away.  Signs of bladder infection: pain when you urinate (use the toilet), need to go more often and more urgently.  The bag of bell (rupture of membranes) breaks, or you notice leaking in your underwear.  Bright red blood in your underwear.  Abdominal (lower belly) or stomach pain.  For first baby: Contractions (tightening) less than 5 minutes apart for one hour or more.  Second (plus) baby: Contractions (tightening) less than 10 minutes apart and getting stronger.  *If less than 34 weeks: Contractions (tightenings) more than 6 times in one hour.  Increase or change in vaginal discharge (note the color and amount)      Follow-up:  As scheduled in the clinic

## 2018-11-13 NOTE — PLAN OF CARE
Problem: Diabetes in Pregnancy (Adult,Obstetrics,Pediatric)  Goal: Signs and Symptoms of Listed Potential Problems Will be Absent, Minimized or Managed (Diabetes in Pregnancy)  Signs and symptoms of listed potential problems will be absent, minimized or managed by discharge/transition of care (reference Diabetes in Pregnancy (Adult,Obstetrics,Pediatric) CPG).   Outcome: No Change  Pt vital signs stable. Pt with tachycardia. MD Roy updated.     Pt denies LOF, VB, contractions.     Endocrinology consult completed this morning. Orders adjusted due to steroid injection. Blood glucose well managed with insulin/metformin regimen.     Due for second betamethasone injection at 1800, then plan to discharge to home.

## 2018-11-13 NOTE — PROVIDER NOTIFICATION
11/13/18 0408   Provider Notification   Provider Name/Title Dr. Amaro    Method of Notification Phone   Request Evaluate - Remote   Notification Reason Status Update     Provider requesting a insulin gtt to be initiated due to the elevated blood sugar at 0200 of 189, blood sugar rechecks and it was 179, and patient had just drank some milk about 1 hour ago.     Provider also notified of patient's elevated pulse. Per patient she normally has a pulse greater than 100. Patient is also anxious at this time. Provider requesting a recheck.     Provider ok with not starting the insulin gtt at this time, and just getting a fasting gtt in the am. Will continue to monitor and update provider with any changes or concerns.

## 2018-11-13 NOTE — UTILIZATION REVIEW
"  Admission Status; Secondary Review Determination         Under the authority of the Utilization Management Committee, the utilization review process indicated a secondary review on the above patient.  The review outcome is based on review of the medical records, discussions with staff, and applying clinical experience noted on the date of the review.          (x) Observation Status Appropriate - This patient does not meet hospital inpatient criteria and is placed in observation status. If this patient's primary payer is Medicare and was admitted as an inpatient, Condition Code 44 should be used and patient status changed to \"observation\".     RATIONALE FOR DETERMINATION   \"38 year old , at 23w6d by embryo transfer dating, who presents with short cervix in the setting of previable PPROM of twin gestation in the prior pregnancy. We had a thorough discussion that given cervical length today would NOT recommend a cerclage. The risks of the procedure outweight the benefits at this point. DO recommend continued treatment with vaginal progesterone. Endocrinology consult completed and plan for discharge home after second betamethasone injection this evening.\"  The severity of illness, intensity of service provided, expected LOS and risk for adverse outcome make the care appropriate for further observation; however, doesn't meet criteria for hospital inpatient admission. Dr Wu   notified of this determination.    This document was produced using voice recognition software.      The information on this document is developed by the utilization review team in order for the business office to ensure compliance.  This only denotes the appropriateness of proper admission status and does not reflect the quality of care rendered.         The definitions of Inpatient Status and Observation Status used in making the determination above are those provided in the CMS Coverage Manual, Chapter 1 and Chapter 6, section 70.4. "      Sincerely,     CRISTIANA WAYNE MD    System Medical Director  Utilization Management  [facility].

## 2018-11-13 NOTE — PROVIDER NOTIFICATION
11/12/18 4794   Provider Notification   Provider Name/Title Dr. Amaro   Method of Notification Phone   Request Evaluate - Remote   Notification Reason Status Update;Patient Request     Notified provider for clarification of plan for the night. Provider wants patient on continuous monitoring. Notified provider that patient has not felt any cramping or contractions. No bleeding, LOF, or discharge. Provider wanting patient to be NPO at 0400, but indicated that an IV is not needed at that time. Plan per provider is to see how the ultrasound goes and initiate IV if a cerclage is decided.

## 2018-11-13 NOTE — DISCHARGE SUMMARY
[facility] Discharge Summary    Sylvain Major MRN# [id number]   Age: 38 year old YOB: 1980     Date of Admission:  2018  Date of Discharge:  2018  Admitting Physician:  Arcelia Roy MD  Discharge Physician:  Arcelia Roy MD     Admission Diagnosis:  1.  History of Previable PPROM at 19 weeks with fetal demise of twin A and delivery at twin A at 21w5d followed by delivery of twin B at 21w6d  2. T2DM- Diagnosed in 2017   3. AMA. Low risk NIPT, and MSAFP   4. Shortened cervix noted on US    Discharge Diagnosis:  1. History of Previable PPROM at 19 weeks with fetal demise of twin A and delivery at twin A at 21w5d followed by delivery of twin B at 21w6d  2. T2DM   3. AMA. Low risk NIPT, and MSAFP   4. Shortened cervix noted on US without evidence of  labor    Procedures:  Comprehensive and transvaginal US    Consultations:  Endocrinology, NICU    Medications prior to admission:    Meds:                       Prescriptions Prior to Admission   Medication Sig Dispense Refill Last Dose     aspirin 81 MG EC tablet Take 1 tablet (81 mg) by mouth daily 90 tablet 3 Taking     cetirizine (ZYRTEC) 10 MG tablet Take 1 tablet (10 mg) by mouth every evening 30 tablet 1 Taking     Cholecalciferol (VITAMIN D) 2000 UNITS tablet Take 2,000 Units by mouth     Taking     NPH 30 units QHS           Novalog  11 units with lunch  6 units with dinner           metFORMIN (GLUCOPHAGE) 1000 MG tablet Take 1 tablet (1,000 mg) by mouth 2 times daily (with meals) 180 tablet 3 Taking     Omega-3 Fatty Acids (FISH OIL PO)       Taking     Prenatal Vit-Fe Fumarate-FA (PRENATAL MULTIVITAMIN PLUS IRON) 27-0.8 MG TABS per tablet Take 1 tablet by mouth daily        Brief History of Presentation:  Ms. Sylvain Major is a 38 year old  at 23w5d by embryo transfer date, who presented to her primary OB/Gyn clinic for repeat TVUS for CL assessment given her  history of  delivery in the previous pregnancy. Her cervix noted to be short with funneling (shortest residual length measurement was 18 mm). She previously had serial cervical length assessments and her cervix was approximately 3.6 cm and closed. Denies ctx, VB, LOF. Reports good FM.    Hospital Course:  Her work-up for  labor was negative, though gonnorrhea and chlamydia results were pending at the time of discharge. She was started on vaginal progesterone on  and received BMZ on -. On HD#2, she had TVUS/follow-up comp showing cervical length of 25mm with dynamic funneling. She was recommended to continue vaginal progesterone until 37wk GA. She was seen by NICU and endocrinology while admitted, adjustments in insulin regimen were made by endocrinology and she was deemed stable for discharge after receiving her second dose of betamethasone.    Discharge Instructions:  Call or present to labor and delivery if you experience:   -Regular painful contractions concerning for labor   -Leakage of fluid concerning for ruptured membranes   -Decreased fetal movement   -Bright red vaginal bleeding    -Headache, vision changes, upper abdominal pain, significant increase in swelling,   generalized unwell feeling    Follow up:  Follow up with primary OB/Gyn Dr. Quintero and Endocrinology as scheduled on Thurs 11/15/18.       Review of your medicines      START taking       Dose / Directions    blood glucose calibration solution   Used for:  Type 2 diabetes mellitus affecting pregnancy, antepartum        Use to calibrate blood glucose monitor as directed.   Quantity:  1 each   Refills:  0       * insulin aspart 100 UNIT/ML injection   Commonly known as:  NovoLOG PEN   Used for:  Type 2 diabetes mellitus affecting pregnancy, antepartum        Dose:  8 Units   Inject 8 Units Subcutaneous daily (with dinner) Starting Friday, take normal dose (6units) again   Quantity:  3 mL   Refills:  0       * insulin  aspart 100 UNIT/ML injection   Commonly known as:  NovoLOG PEN   Used for:  Type 2 diabetes mellitus without complication, without long-term current use of insulin (H)        Dose:  13 Units   Inject 13 Units Subcutaneous daily (with lunch) Starting Friday, take normal dose (11units) again   Quantity:  3 mL   Refills:  0       * insulin aspart 100 UNIT/ML injection   Commonly known as:  NovoLOG PEN   Used for:  Type 2 diabetes mellitus without complication, without long-term current use of insulin (H)        Dose:  3 Units   Start taking on:  11/14/2018   Inject 3 Units Subcutaneous daily (with breakfast) for 2 days   Quantity:  3 mL   Refills:  0       insulin isophane human 100 UNIT/ML injection   Commonly known as:  HumuLIN N PEN   Used for:  Type 2 diabetes mellitus affecting pregnancy, antepartum        Dose:  30 Units   Inject 30 Units Subcutaneous At Bedtime   Quantity:  3 mL   Refills:  0       progesterone 200 MG capsule   Commonly known as:  PROMETRIUM        Dose:  200 mg   Place 1 capsule (200 mg) vaginally daily   Quantity:  28 capsule   Refills:  0       * Notice:  This list has 3 medication(s) that are the same as other medications prescribed for you. Read the directions carefully, and ask your doctor or other care provider to review them with you.      CONTINUE these medicines which have NOT CHANGED       Dose / Directions    aspirin 81 MG EC tablet   Used for:  Type 2 diabetes mellitus without complication, without long-term current use of insulin (H)        Dose:  81 mg   Take 1 tablet (81 mg) by mouth daily   Quantity:  90 tablet   Refills:  3       BLOOD GLUCOSE TEST STRIPS Strp   Used for:  Type 2 diabetes mellitus without complication, without long-term current use of insulin (H)        Dose:  1 strip   1 strip by Lancet route daily   Quantity:  100 each   Refills:  3       cetirizine 10 MG tablet   Commonly known as:  zyrTEC   Used for:  Allergic contact dermatitis, unspecified trigger,  Itching        Dose:  10 mg   Take 1 tablet (10 mg) by mouth every evening   Quantity:  30 tablet   Refills:  1       FISH OIL PO        Refills:  0       metFORMIN 1000 MG tablet   Commonly known as:  GLUCOPHAGE   Used for:  Type 2 diabetes mellitus without complication, without long-term current use of insulin (H)        Dose:  1000 mg   Take 1 tablet (1,000 mg) by mouth 2 times daily (with meals)   Quantity:  180 tablet   Refills:  3       prenatal multivitamin plus iron 27-0.8 MG Tabs per tablet        Dose:  1 tablet   Take 1 tablet by mouth daily   Refills:  0       vitamin D 2000 units tablet        Dose:  2000 Units   Take 2,000 Units by mouth   Refills:  0            Where to get your medicines      These medications were sent to [facility] [address]  [address] [address]     Phone:  [phone]      blood glucose calibration solution     insulin aspart 100 UNIT/ML injection     insulin aspart 100 UNIT/ML injection     insulin aspart 100 UNIT/ML injection     insulin isophane human 100 UNIT/ML injection     progesterone 200 MG capsule           Tata Guardado MD  OB/Gyn PGY-2  11/13/2018    Physician Attestation   I, Arcelia Roy, saw and evaluated this patient prior to discharge.  I discussed the patient with the resident/fellow and agree with plan of care as documented in the note.      I personally reviewed vital signs, medications, labs and imaging.    I personally spent 25 minutes on discharge activities.    Arcelia Roy MD  Date of Service (when I saw the patient): 11/13/18

## 2018-11-14 NOTE — PLAN OF CARE
Problem: Patient Care Overview  Goal: Plan of Care/Patient Progress Review  Outcome: No Change  VSS - ex tachycardic in 120's. Per pt she runs a high pulse and believes it is due to being nervous. 2 hr PP . Pt denies pain, bleeding, or LOF. Second betamethasone given at 1819. EFM AGA - see previous note, no contractions noted on toco. Discharge teaching done with pt and significant other, pt stated an understanding of teaching. Plan to follow up as scheduled with primary OB.

## 2018-11-19 ENCOUNTER — TELEPHONE (OUTPATIENT)
Dept: MATERNAL FETAL MEDICINE | Facility: CLINIC | Age: 38
End: 2018-11-19

## 2018-11-19 NOTE — TELEPHONE ENCOUNTER
[facility]  was told by patient that she was declining services. The writer reached out to referring provider today to let them know of patients decision and that we will remove orders.     Daniela FREY  , [facility]

## 2018-12-02 ENCOUNTER — SURGERY (OUTPATIENT)
Age: 38
End: 2018-12-02

## 2018-12-02 ENCOUNTER — ANESTHESIA (OUTPATIENT)
Dept: OBGYN | Facility: CLINIC | Age: 38
End: 2018-12-02
Payer: COMMERCIAL

## 2018-12-02 ENCOUNTER — ANESTHESIA EVENT (OUTPATIENT)
Dept: OBGYN | Facility: CLINIC | Age: 38
End: 2018-12-02
Payer: COMMERCIAL

## 2018-12-02 ENCOUNTER — HOSPITAL ENCOUNTER (INPATIENT)
Facility: CLINIC | Age: 38
LOS: 2 days | Discharge: HOME | End: 2018-12-04
Attending: OBSTETRICS & GYNECOLOGY | Admitting: OBSTETRICS & GYNECOLOGY
Payer: COMMERCIAL

## 2018-12-02 DIAGNOSIS — Z98.891 S/P CESAREAN SECTION: Primary | ICD-10-CM

## 2018-12-02 PROBLEM — Z36.89 ENCOUNTER FOR TRIAGE IN PREGNANT PATIENT: Status: ACTIVE | Noted: 2018-12-02

## 2018-12-02 LAB
ABO + RH BLD: NORMAL
ABO + RH BLD: NORMAL
AMPHETAMINES UR QL SCN: NEGATIVE
BLD GP AB SCN SERPL QL: NORMAL
BLOOD BANK CMNT PATIENT-IMP: NORMAL
CANNABINOIDS UR QL: NEGATIVE
COCAINE UR QL: NEGATIVE
ERYTHROCYTE [DISTWIDTH] IN BLOOD BY AUTOMATED COUNT: 13.9 % (ref 10–15)
GLUCOSE BLDC GLUCOMTR-MCNC: 109 MG/DL (ref 70–99)
GLUCOSE BLDC GLUCOMTR-MCNC: 121 MG/DL (ref 70–99)
GLUCOSE BLDC GLUCOMTR-MCNC: 131 MG/DL (ref 70–99)
HCT VFR BLD AUTO: 35 % (ref 35–47)
HGB BLD-MCNC: 11.9 G/DL (ref 11.7–15.7)
MCH RBC QN AUTO: 31.6 PG (ref 26.5–33)
MCHC RBC AUTO-ENTMCNC: 34 G/DL (ref 31.5–36.5)
MCV RBC AUTO: 93 FL (ref 78–100)
OPIATES UR QL SCN: NEGATIVE
PCP UR QL SCN: NEGATIVE
PLATELET # BLD AUTO: 186 10E9/L (ref 150–450)
RBC # BLD AUTO: 3.77 10E12/L (ref 3.8–5.2)
RUPTURE OF FETAL MEMBRANES BY ROM PLUS: POSITIVE
SPECIMEN EXP DATE BLD: NORMAL
WBC # BLD AUTO: 10.5 10E9/L (ref 4–11)

## 2018-12-02 PROCEDURE — 86850 RBC ANTIBODY SCREEN: CPT | Performed by: OBSTETRICS & GYNECOLOGY

## 2018-12-02 PROCEDURE — 37000008 ZZH ANESTHESIA TECHNICAL FEE, 1ST 30 MIN: Performed by: OBSTETRICS & GYNECOLOGY

## 2018-12-02 PROCEDURE — 25000128 H RX IP 250 OP 636: Performed by: INTERNAL MEDICINE

## 2018-12-02 PROCEDURE — 86900 BLOOD TYPING SEROLOGIC ABO: CPT | Performed by: OBSTETRICS & GYNECOLOGY

## 2018-12-02 PROCEDURE — 86780 TREPONEMA PALLIDUM: CPT | Performed by: OBSTETRICS & GYNECOLOGY

## 2018-12-02 PROCEDURE — 80307 DRUG TEST PRSMV CHEM ANLYZR: CPT | Performed by: OBSTETRICS & GYNECOLOGY

## 2018-12-02 PROCEDURE — 36415 COLL VENOUS BLD VENIPUNCTURE: CPT | Performed by: OBSTETRICS & GYNECOLOGY

## 2018-12-02 PROCEDURE — 25000125 ZZHC RX 250: Performed by: NURSE ANESTHETIST, CERTIFIED REGISTERED

## 2018-12-02 PROCEDURE — 25000565 ZZH ISOFLURANE, EA 15 MIN: Performed by: OBSTETRICS & GYNECOLOGY

## 2018-12-02 PROCEDURE — C1765 ADHESION BARRIER: HCPCS | Performed by: OBSTETRICS & GYNECOLOGY

## 2018-12-02 PROCEDURE — 85027 COMPLETE CBC AUTOMATED: CPT | Performed by: OBSTETRICS & GYNECOLOGY

## 2018-12-02 PROCEDURE — 25000128 H RX IP 250 OP 636: Performed by: NURSE ANESTHETIST, CERTIFIED REGISTERED

## 2018-12-02 PROCEDURE — 25000132 ZZH RX MED GY IP 250 OP 250 PS 637: Performed by: INTERNAL MEDICINE

## 2018-12-02 PROCEDURE — 12000030 ZZH R&B OB INTERMEDIATE UMMC

## 2018-12-02 PROCEDURE — 25000132 ZZH RX MED GY IP 250 OP 250 PS 637

## 2018-12-02 PROCEDURE — 36000059 ZZH SURGERY LEVEL 3 EA 15 ADDTL MIN UMMC: Performed by: OBSTETRICS & GYNECOLOGY

## 2018-12-02 PROCEDURE — 88305 TISSUE EXAM BY PATHOLOGIST: CPT | Performed by: INTERNAL MEDICINE

## 2018-12-02 PROCEDURE — C9290 INJ, BUPIVACAINE LIPOSOME: HCPCS | Performed by: STUDENT IN AN ORGANIZED HEALTH CARE EDUCATION/TRAINING PROGRAM

## 2018-12-02 PROCEDURE — 40000170 ZZH STATISTIC PRE-PROCEDURE ASSESSMENT II: Performed by: OBSTETRICS & GYNECOLOGY

## 2018-12-02 PROCEDURE — 37000009 ZZH ANESTHESIA TECHNICAL FEE, EACH ADDTL 15 MIN: Performed by: OBSTETRICS & GYNECOLOGY

## 2018-12-02 PROCEDURE — 36000057 ZZH SURGERY LEVEL 3 1ST 30 MIN - UMMC: Performed by: OBSTETRICS & GYNECOLOGY

## 2018-12-02 PROCEDURE — 25000125 ZZHC RX 250: Performed by: STUDENT IN AN ORGANIZED HEALTH CARE EDUCATION/TRAINING PROGRAM

## 2018-12-02 PROCEDURE — 71000014 ZZH RECOVERY PHASE 1 LEVEL 2 FIRST HR: Performed by: OBSTETRICS & GYNECOLOGY

## 2018-12-02 PROCEDURE — 25000128 H RX IP 250 OP 636: Performed by: STUDENT IN AN ORGANIZED HEALTH CARE EDUCATION/TRAINING PROGRAM

## 2018-12-02 PROCEDURE — 27110028 ZZH OR GENERAL SUPPLY NON-STERILE: Performed by: OBSTETRICS & GYNECOLOGY

## 2018-12-02 PROCEDURE — 00000146 ZZHCL STATISTIC GLUCOSE BY METER IP

## 2018-12-02 PROCEDURE — 86901 BLOOD TYPING SEROLOGIC RH(D): CPT | Performed by: OBSTETRICS & GYNECOLOGY

## 2018-12-02 PROCEDURE — 88305 TISSUE EXAM BY PATHOLOGIST: CPT | Mod: 26 | Performed by: INTERNAL MEDICINE

## 2018-12-02 PROCEDURE — G0463 HOSPITAL OUTPT CLINIC VISIT: HCPCS

## 2018-12-02 PROCEDURE — 84112 EVAL AMNIOTIC FLUID PROTEIN: CPT | Performed by: OBSTETRICS & GYNECOLOGY

## 2018-12-02 PROCEDURE — 27210794 ZZH OR GENERAL SUPPLY STERILE: Performed by: OBSTETRICS & GYNECOLOGY

## 2018-12-02 RX ORDER — SIMETHICONE 80 MG
80 TABLET,CHEWABLE ORAL 4 TIMES DAILY PRN
Status: DISCONTINUED | OUTPATIENT
Start: 2018-12-02 | End: 2018-12-04 | Stop reason: HOSPADM

## 2018-12-02 RX ORDER — KETOROLAC TROMETHAMINE 30 MG/ML
30 INJECTION, SOLUTION INTRAMUSCULAR; INTRAVENOUS EVERY 6 HOURS
Status: COMPLETED | OUTPATIENT
Start: 2018-12-02 | End: 2018-12-03

## 2018-12-02 RX ORDER — FENTANYL CITRATE 50 UG/ML
25-50 INJECTION, SOLUTION INTRAMUSCULAR; INTRAVENOUS
Status: DISCONTINUED | OUTPATIENT
Start: 2018-12-02 | End: 2018-12-02

## 2018-12-02 RX ORDER — NALOXONE HYDROCHLORIDE 0.4 MG/ML
.1-.4 INJECTION, SOLUTION INTRAMUSCULAR; INTRAVENOUS; SUBCUTANEOUS
Status: DISCONTINUED | OUTPATIENT
Start: 2018-12-02 | End: 2018-12-04 | Stop reason: HOSPADM

## 2018-12-02 RX ORDER — BUPIVACAINE HYDROCHLORIDE AND EPINEPHRINE 2.5; 5 MG/ML; UG/ML
INJECTION, SOLUTION INFILTRATION; PERINEURAL PRN
Status: DISCONTINUED | OUTPATIENT
Start: 2018-12-02 | End: 2018-12-02

## 2018-12-02 RX ORDER — FLUMAZENIL 0.1 MG/ML
0.2 INJECTION, SOLUTION INTRAVENOUS
Status: CANCELLED | OUTPATIENT
Start: 2018-12-02

## 2018-12-02 RX ORDER — OXYTOCIN/0.9 % SODIUM CHLORIDE 30/500 ML
PLASTIC BAG, INJECTION (ML) INTRAVENOUS PRN
Status: DISCONTINUED | OUTPATIENT
Start: 2018-12-02 | End: 2018-12-02

## 2018-12-02 RX ORDER — IBUPROFEN 600 MG/1
600 TABLET, FILM COATED ORAL EVERY 6 HOURS PRN
Status: DISCONTINUED | OUTPATIENT
Start: 2018-12-02 | End: 2018-12-04 | Stop reason: HOSPADM

## 2018-12-02 RX ORDER — DOCUSATE SODIUM 100 MG/1
100 CAPSULE, LIQUID FILLED ORAL 2 TIMES DAILY
Status: DISCONTINUED | OUTPATIENT
Start: 2018-12-02 | End: 2018-12-04 | Stop reason: HOSPADM

## 2018-12-02 RX ORDER — METHYLERGONOVINE MALEATE 0.2 MG/ML
INJECTION INTRAVENOUS
Status: DISCONTINUED
Start: 2018-12-02 | End: 2018-12-02 | Stop reason: HOSPADM

## 2018-12-02 RX ORDER — CEFAZOLIN SODIUM 1 G/3ML
INJECTION, POWDER, FOR SOLUTION INTRAMUSCULAR; INTRAVENOUS PRN
Status: DISCONTINUED | OUTPATIENT
Start: 2018-12-02 | End: 2018-12-02

## 2018-12-02 RX ORDER — MEPERIDINE HYDROCHLORIDE 25 MG/ML
12.5 INJECTION INTRAMUSCULAR; INTRAVENOUS; SUBCUTANEOUS
Status: DISCONTINUED | OUTPATIENT
Start: 2018-12-02 | End: 2018-12-02

## 2018-12-02 RX ORDER — LIDOCAINE 40 MG/G
CREAM TOPICAL
Status: CANCELLED | OUTPATIENT
Start: 2018-12-02

## 2018-12-02 RX ORDER — DEXTROSE MONOHYDRATE 25 G/50ML
25-50 INJECTION, SOLUTION INTRAVENOUS
Status: DISCONTINUED | OUTPATIENT
Start: 2018-12-02 | End: 2018-12-04 | Stop reason: HOSPADM

## 2018-12-02 RX ORDER — ONDANSETRON 4 MG/1
4 TABLET, ORALLY DISINTEGRATING ORAL EVERY 30 MIN PRN
Status: DISCONTINUED | OUTPATIENT
Start: 2018-12-02 | End: 2018-12-02

## 2018-12-02 RX ORDER — OXYTOCIN/0.9 % SODIUM CHLORIDE 30/500 ML
PLASTIC BAG, INJECTION (ML) INTRAVENOUS CONTINUOUS PRN
Status: DISCONTINUED | OUTPATIENT
Start: 2018-12-02 | End: 2018-12-02

## 2018-12-02 RX ORDER — OXYTOCIN/0.9 % SODIUM CHLORIDE 30/500 ML
100 PLASTIC BAG, INJECTION (ML) INTRAVENOUS CONTINUOUS
Status: DISCONTINUED | OUTPATIENT
Start: 2018-12-02 | End: 2018-12-04 | Stop reason: HOSPADM

## 2018-12-02 RX ORDER — BISACODYL 10 MG
10 SUPPOSITORY, RECTAL RECTAL DAILY PRN
Status: DISCONTINUED | OUTPATIENT
Start: 2018-12-04 | End: 2018-12-04 | Stop reason: HOSPADM

## 2018-12-02 RX ORDER — OXYTOCIN/0.9 % SODIUM CHLORIDE 30/500 ML
PLASTIC BAG, INJECTION (ML) INTRAVENOUS
Status: DISCONTINUED
Start: 2018-12-02 | End: 2018-12-02 | Stop reason: HOSPADM

## 2018-12-02 RX ORDER — LIDOCAINE 40 MG/G
CREAM TOPICAL
Status: DISCONTINUED | OUTPATIENT
Start: 2018-12-02 | End: 2018-12-04 | Stop reason: HOSPADM

## 2018-12-02 RX ORDER — NICOTINE POLACRILEX 4 MG
15-30 LOZENGE BUCCAL
Status: DISCONTINUED | OUTPATIENT
Start: 2018-12-02 | End: 2018-12-04 | Stop reason: HOSPADM

## 2018-12-02 RX ORDER — MISOPROSTOL 200 UG/1
800 TABLET ORAL
Status: DISCONTINUED | OUTPATIENT
Start: 2018-12-02 | End: 2018-12-04 | Stop reason: HOSPADM

## 2018-12-02 RX ORDER — FENTANYL CITRATE 50 UG/ML
25-50 INJECTION, SOLUTION INTRAMUSCULAR; INTRAVENOUS
Status: CANCELLED | OUTPATIENT
Start: 2018-12-02

## 2018-12-02 RX ORDER — NALOXONE HYDROCHLORIDE 0.4 MG/ML
.1-.4 INJECTION, SOLUTION INTRAMUSCULAR; INTRAVENOUS; SUBCUTANEOUS
Status: CANCELLED | OUTPATIENT
Start: 2018-12-02

## 2018-12-02 RX ORDER — LANOLIN 100 %
OINTMENT (GRAM) TOPICAL
Status: DISCONTINUED | OUTPATIENT
Start: 2018-12-02 | End: 2018-12-04 | Stop reason: HOSPADM

## 2018-12-02 RX ORDER — CEFAZOLIN SODIUM 1 G/3ML
1 INJECTION, POWDER, FOR SOLUTION INTRAMUSCULAR; INTRAVENOUS SEE ADMIN INSTRUCTIONS
Status: CANCELLED | OUTPATIENT
Start: 2018-12-02

## 2018-12-02 RX ORDER — SODIUM CHLORIDE, SODIUM LACTATE, POTASSIUM CHLORIDE, CALCIUM CHLORIDE 600; 310; 30; 20 MG/100ML; MG/100ML; MG/100ML; MG/100ML
INJECTION, SOLUTION INTRAVENOUS
Status: DISCONTINUED
Start: 2018-12-02 | End: 2018-12-02 | Stop reason: HOSPADM

## 2018-12-02 RX ORDER — OXYTOCIN/0.9 % SODIUM CHLORIDE 30/500 ML
PLASTIC BAG, INJECTION (ML) INTRAVENOUS
Status: COMPLETED
Start: 2018-12-02 | End: 2018-12-02

## 2018-12-02 RX ORDER — CITRIC ACID/SODIUM CITRATE 334-500MG
30 SOLUTION, ORAL ORAL
Status: CANCELLED | OUTPATIENT
Start: 2018-12-02

## 2018-12-02 RX ORDER — MISOPROSTOL 200 UG/1
TABLET ORAL
Status: DISCONTINUED
Start: 2018-12-02 | End: 2018-12-02 | Stop reason: HOSPADM

## 2018-12-02 RX ORDER — OXYTOCIN 10 [USP'U]/ML
10 INJECTION, SOLUTION INTRAMUSCULAR; INTRAVENOUS
Status: DISCONTINUED | OUTPATIENT
Start: 2018-12-02 | End: 2018-12-04 | Stop reason: HOSPADM

## 2018-12-02 RX ORDER — PROPOFOL 10 MG/ML
INJECTION, EMULSION INTRAVENOUS PRN
Status: DISCONTINUED | OUTPATIENT
Start: 2018-12-02 | End: 2018-12-02

## 2018-12-02 RX ORDER — DEXTROSE, SODIUM CHLORIDE, SODIUM LACTATE, POTASSIUM CHLORIDE, AND CALCIUM CHLORIDE 5; .6; .31; .03; .02 G/100ML; G/100ML; G/100ML; G/100ML; G/100ML
INJECTION, SOLUTION INTRAVENOUS CONTINUOUS
Status: DISCONTINUED | OUTPATIENT
Start: 2018-12-02 | End: 2018-12-04 | Stop reason: HOSPADM

## 2018-12-02 RX ORDER — ACETAMINOPHEN 325 MG/1
650 TABLET ORAL EVERY 4 HOURS PRN
Status: DISCONTINUED | OUTPATIENT
Start: 2018-12-05 | End: 2018-12-04 | Stop reason: HOSPADM

## 2018-12-02 RX ORDER — SODIUM CHLORIDE, SODIUM LACTATE, POTASSIUM CHLORIDE, CALCIUM CHLORIDE 600; 310; 30; 20 MG/100ML; MG/100ML; MG/100ML; MG/100ML
INJECTION, SOLUTION INTRAVENOUS CONTINUOUS PRN
Status: DISCONTINUED | OUTPATIENT
Start: 2018-12-02 | End: 2018-12-02

## 2018-12-02 RX ORDER — ONDANSETRON 2 MG/ML
4 INJECTION INTRAMUSCULAR; INTRAVENOUS EVERY 30 MIN PRN
Status: DISCONTINUED | OUTPATIENT
Start: 2018-12-02 | End: 2018-12-02

## 2018-12-02 RX ORDER — ACETAMINOPHEN 325 MG/1
975 TABLET ORAL EVERY 8 HOURS
Status: DISCONTINUED | OUTPATIENT
Start: 2018-12-02 | End: 2018-12-04 | Stop reason: HOSPADM

## 2018-12-02 RX ORDER — OXYCODONE HYDROCHLORIDE 5 MG/1
5 TABLET ORAL EVERY 4 HOURS PRN
Status: DISCONTINUED | OUTPATIENT
Start: 2018-12-02 | End: 2018-12-04 | Stop reason: HOSPADM

## 2018-12-02 RX ORDER — CEFAZOLIN SODIUM 1 G/3ML
INJECTION, POWDER, FOR SOLUTION INTRAMUSCULAR; INTRAVENOUS
Status: DISCONTINUED
Start: 2018-12-02 | End: 2018-12-02 | Stop reason: HOSPADM

## 2018-12-02 RX ORDER — HYDROMORPHONE HYDROCHLORIDE 1 MG/ML
.3-.5 INJECTION, SOLUTION INTRAMUSCULAR; INTRAVENOUS; SUBCUTANEOUS EVERY 10 MIN PRN
Status: DISCONTINUED | OUTPATIENT
Start: 2018-12-02 | End: 2018-12-02

## 2018-12-02 RX ORDER — HYDROCORTISONE 2.5 %
CREAM (GRAM) TOPICAL 3 TIMES DAILY PRN
Status: DISCONTINUED | OUTPATIENT
Start: 2018-12-02 | End: 2018-12-04 | Stop reason: HOSPADM

## 2018-12-02 RX ORDER — METHYLERGONOVINE MALEATE 0.2 MG/ML
INJECTION INTRAVENOUS PRN
Status: DISCONTINUED | OUTPATIENT
Start: 2018-12-02 | End: 2018-12-02

## 2018-12-02 RX ORDER — NALOXONE HYDROCHLORIDE 0.4 MG/ML
.1-.4 INJECTION, SOLUTION INTRAMUSCULAR; INTRAVENOUS; SUBCUTANEOUS
Status: DISCONTINUED | OUTPATIENT
Start: 2018-12-02 | End: 2018-12-02

## 2018-12-02 RX ORDER — FENTANYL CITRATE 50 UG/ML
INJECTION, SOLUTION INTRAMUSCULAR; INTRAVENOUS PRN
Status: DISCONTINUED | OUTPATIENT
Start: 2018-12-02 | End: 2018-12-02

## 2018-12-02 RX ORDER — SODIUM CHLORIDE, SODIUM LACTATE, POTASSIUM CHLORIDE, CALCIUM CHLORIDE 600; 310; 30; 20 MG/100ML; MG/100ML; MG/100ML; MG/100ML
INJECTION, SOLUTION INTRAVENOUS CONTINUOUS
Status: DISCONTINUED | OUTPATIENT
Start: 2018-12-02 | End: 2018-12-04 | Stop reason: HOSPADM

## 2018-12-02 RX ORDER — OXYCODONE HYDROCHLORIDE 5 MG/1
5-10 TABLET ORAL
Status: DISCONTINUED | OUTPATIENT
Start: 2018-12-02 | End: 2018-12-04 | Stop reason: HOSPADM

## 2018-12-02 RX ORDER — ONDANSETRON 2 MG/ML
INJECTION INTRAMUSCULAR; INTRAVENOUS PRN
Status: DISCONTINUED | OUTPATIENT
Start: 2018-12-02 | End: 2018-12-02

## 2018-12-02 RX ORDER — CEFAZOLIN SODIUM 2 G/100ML
2 INJECTION, SOLUTION INTRAVENOUS
Status: CANCELLED | OUTPATIENT
Start: 2018-12-02

## 2018-12-02 RX ORDER — FENTANYL CITRATE 50 UG/ML
25-50 INJECTION, SOLUTION INTRAMUSCULAR; INTRAVENOUS
Status: DISCONTINUED | OUTPATIENT
Start: 2018-12-02 | End: 2018-12-02 | Stop reason: HOSPADM

## 2018-12-02 RX ORDER — OXYTOCIN/0.9 % SODIUM CHLORIDE 30/500 ML
340 PLASTIC BAG, INJECTION (ML) INTRAVENOUS CONTINUOUS PRN
Status: DISCONTINUED | OUTPATIENT
Start: 2018-12-02 | End: 2018-12-04 | Stop reason: HOSPADM

## 2018-12-02 RX ORDER — ONDANSETRON 2 MG/ML
4 INJECTION INTRAMUSCULAR; INTRAVENOUS EVERY 6 HOURS PRN
Status: DISCONTINUED | OUTPATIENT
Start: 2018-12-02 | End: 2018-12-04 | Stop reason: HOSPADM

## 2018-12-02 RX ORDER — SODIUM CHLORIDE, SODIUM LACTATE, POTASSIUM CHLORIDE, CALCIUM CHLORIDE 600; 310; 30; 20 MG/100ML; MG/100ML; MG/100ML; MG/100ML
INJECTION, SOLUTION INTRAVENOUS CONTINUOUS
Status: CANCELLED | OUTPATIENT
Start: 2018-12-02

## 2018-12-02 RX ADMIN — SUGAMMADEX 200 MG: 100 INJECTION, SOLUTION INTRAVENOUS at 11:47

## 2018-12-02 RX ADMIN — KETOROLAC TROMETHAMINE 30 MG: 30 INJECTION, SOLUTION INTRAMUSCULAR at 12:22

## 2018-12-02 RX ADMIN — FENTANYL CITRATE 25 MCG: 50 INJECTION, SOLUTION INTRAMUSCULAR; INTRAVENOUS at 12:12

## 2018-12-02 RX ADMIN — MIDAZOLAM 2 MG: 1 INJECTION INTRAMUSCULAR; INTRAVENOUS at 10:47

## 2018-12-02 RX ADMIN — KETOROLAC TROMETHAMINE 30 MG: 30 INJECTION, SOLUTION INTRAMUSCULAR at 18:13

## 2018-12-02 RX ADMIN — BUPIVACAINE HYDROCHLORIDE AND EPINEPHRINE BITARTRATE 20 ML: 2.5; .005 INJECTION, SOLUTION INFILTRATION; PERINEURAL at 11:55

## 2018-12-02 RX ADMIN — AZITHROMYCIN MONOHYDRATE 500 MG: 500 INJECTION, POWDER, LYOPHILIZED, FOR SOLUTION INTRAVENOUS at 11:21

## 2018-12-02 RX ADMIN — ACETAMINOPHEN 975 MG: 325 TABLET, FILM COATED ORAL at 23:51

## 2018-12-02 RX ADMIN — Medication 100 MG: at 10:40

## 2018-12-02 RX ADMIN — CEFAZOLIN 2 G: 1 INJECTION, POWDER, FOR SOLUTION INTRAMUSCULAR; INTRAVENOUS at 10:44

## 2018-12-02 RX ADMIN — MISOPROSTOL 800 MCG: 200 TABLET ORAL at 11:41

## 2018-12-02 RX ADMIN — FENTANYL CITRATE 50 MCG: 50 INJECTION, SOLUTION INTRAMUSCULAR; INTRAVENOUS at 12:16

## 2018-12-02 RX ADMIN — SODIUM CHLORIDE, POTASSIUM CHLORIDE, SODIUM LACTATE AND CALCIUM CHLORIDE: 600; 310; 30; 20 INJECTION, SOLUTION INTRAVENOUS at 10:33

## 2018-12-02 RX ADMIN — ONDANSETRON 4 MG: 2 INJECTION INTRAMUSCULAR; INTRAVENOUS at 10:57

## 2018-12-02 RX ADMIN — OXYCODONE HYDROCHLORIDE 5 MG: 5 TABLET ORAL at 17:02

## 2018-12-02 RX ADMIN — OXYCODONE HYDROCHLORIDE 10 MG: 5 TABLET ORAL at 23:52

## 2018-12-02 RX ADMIN — OXYCODONE HYDROCHLORIDE 10 MG: 5 TABLET ORAL at 19:51

## 2018-12-02 RX ADMIN — METHYLERGONOVINE MALEATE 200 MCG: 0.2 INJECTION INTRAMUSCULAR; INTRAVENOUS at 10:48

## 2018-12-02 RX ADMIN — Medication 0.2 MG: at 12:34

## 2018-12-02 RX ADMIN — PROPOFOL 150 MG: 10 INJECTION, EMULSION INTRAVENOUS at 10:40

## 2018-12-02 RX ADMIN — KETOROLAC TROMETHAMINE 30 MG: 30 INJECTION, SOLUTION INTRAMUSCULAR at 23:52

## 2018-12-02 RX ADMIN — OXYTOCIN-SODIUM CHLORIDE 0.9% IV SOLN 30 UNIT/500ML 300 ML/HR: 30-0.9/5 SOLUTION at 10:46

## 2018-12-02 RX ADMIN — ROCURONIUM BROMIDE 30 MG: 10 INJECTION INTRAVENOUS at 10:47

## 2018-12-02 RX ADMIN — FENTANYL CITRATE 100 MCG: 50 INJECTION, SOLUTION INTRAMUSCULAR; INTRAVENOUS at 10:46

## 2018-12-02 RX ADMIN — OXYCODONE HYDROCHLORIDE 5 MG: 5 TABLET ORAL at 18:13

## 2018-12-02 RX ADMIN — HYDROMORPHONE HYDROCHLORIDE 0.3 MG: 1 INJECTION, SOLUTION INTRAMUSCULAR; INTRAVENOUS; SUBCUTANEOUS at 11:47

## 2018-12-02 RX ADMIN — ACETAMINOPHEN 975 MG: 325 TABLET, FILM COATED ORAL at 15:20

## 2018-12-02 RX ADMIN — BUPIVACAINE 20 ML: 13.3 INJECTION, SUSPENSION, LIPOSOMAL INFILTRATION at 11:55

## 2018-12-02 ASSESSMENT — ACTIVITIES OF DAILY LIVING (ADL)
RETIRED_EATING: 0-->INDEPENDENT
DRESS: 0-->INDEPENDENT
BATHING: 0-->INDEPENDENT
COGNITION: 0 - NO COGNITION ISSUES REPORTED
RETIRED_COMMUNICATION: 0-->UNDERSTANDS/COMMUNICATES WITHOUT DIFFICULTY
TRANSFERRING: 0-->INDEPENDENT
FALL_HISTORY_WITHIN_LAST_SIX_MONTHS: NO
SWALLOWING: 0-->SWALLOWS FOODS/LIQUIDS WITHOUT DIFFICULTY
AMBULATION: 0-->INDEPENDENT
TOILETING: 0-->INDEPENDENT

## 2018-12-02 NOTE — PLAN OF CARE
Data: Sylvain Major transferred to [facility] via gurney at 1445.   Action: Receiving unit notified of transfer: Yes. Patient and family notified of room change. Report given to Myriam MARTINEZ at 1515. Belongings sent to receiving unit. Accompanied by Registered Nurse. Oriented patient to surroundings. Call light within reach.  Response: Patient tolerated transfer and is stable.

## 2018-12-02 NOTE — OP NOTE
[facility]  Full Operative Progress Note     Surgery Date:  2018  Surgeon:  Clementine Siddiqi MD  Assistants:  MD Rita Bishop, PGY3  Pre-op Diagnosis:  1. Intrauterine pregnancy at 26w4d     2. PPROM at 26w4d     3. Transverse presentation, foot and leg in the vagina through the cervix     4. T2DM     5. AMA     6. Shortened cervix  Post-op Diagnosis:  1. Same      2. Liveborn male infant   Procedure:  STAT primary classical  section via Pfannenstiel skin incision    Anesthesia: GETA  EBL:  1038 mL  IVF:  600 mL crystalloid  UOP:  100 mL clear urine at the end of the case  Drains: Erickson Catheter   Specimens:  Placenta  Complications: None   Indications:   Sylvain Major is a 38 year old  at 26w4d admitted for PPROM at 26w4d who was found to have a fetal foot and leg in the vagina on speculum exam. The risks, benefits, and alternatives of  section, including the risks of bleeding, infection, and unintentional injury to surrounding structures were discussed, and verbal consent was obtained prior to proceeding with the emergency  section.     Findings: Liveborn male infant in transverse presentation, head to maternal right, delivered en caul. Apgars 3, 8, and 8 at 1, 5 and 10 minutes. Weight 920 kg. Grossly normal appearing uterus. Subcentimeter fibroid on posterior aspect of uterus, likely intramural. Normal appearing fallopian tubes and ovaries. Classical incision hemostatic following repair. IV pitocin and IM methergine given for poor uterine tone. Recommend  section at 36-37 weeks for all subsequent pregnancies.    Procedure Details:   The patient was brought to the emergently to the OR.   A erickson catheter was placed into her bladder. She was placed in the dorsal supine position with a slight leftward tilt. She was prepped and draped in STAT fashion with betadine  Once GETA was administered and her airway was  secure, the anesthesia team notified us that it was safe to start her procedure.   A pfannenstiel skin incision was made with the scalpel, and carried down to the underlying fascia with sharp and blunt dissection. The fascia was incised in the midline, and the incision was extended laterally with blunt dissection. The superior aspect of the fascia was grasped with the Kocher clamps and dissected off of the underlying rectus muscles with blunt and sharp dissection. Attention was then turned to the inferior aspect of the fascia, which was similarly dissected off of the underlying rectus muscles. The rectus muscles were  in the midline, and the peritoneum was entered bluntly, and the opening was extended with digital pressure. The bladder blade was placed. A vertical hysterotomy was made and the incision extended with bandage scissors. The infant was in the transverse position with head to maternal right and was delivered simultaneously with the placenta and handed off to the NICU team in a sterile plastic bag.     The uterus was exteriorized and cleared of all clots and debris. Uterine tone was noted to be boggy. IM methergine was given in addition to pitocin through the IV. The hysterotomy was closed with two running locked sutures of 0 vicryl followed by a running locked suture of 0-monocryl. The hysterotomy was noted to be hemostatic after the third layer of closure. The posterior cul-de-sac was cleared of all clots and debris. The uterus was returned to the abdomen. The pericolic gutters were cleared of all clots and debris. The hysterotomy was reexamined and noted to be hemostatic. A layer of seprafilm was placed over the hysterotomy. The fascia and rectus muscles were examined and areas of oozing were controlled with electrocautery. A layer of seprafilm was placed over the rectus muscles. The fascia was closed with a running 0 Vicryl suture. The subcutaneous tissue was irrigated and areas of oozing were  controlled with electrocautery. The subcutaneous tissue was reapproximated with a running suture of 3-0 vicryl. The skin was closed with 4-0 monocryl and covered with a sterile dressing.    All sponge, needle, and instrument counts were correct. The patient tolerated the procedure well, and was transferred to recovery in stable condition. Dr. Siddiqi was present and scrubbed for the entirety of the procedure.     Rita Montoya MD  Ob/Gyn, PGY-3  12/2/2018, 12:01 PM    I was scrubbed and present for the entire procedure.  I have reviewed and edited the above note.    Clementine Siddiqi MD, FACOG

## 2018-12-02 NOTE — ANESTHESIA PREPROCEDURE EVALUATION
"Anesthesia Pre-Procedure Evaluation    Patient: Sylvain Major   MRN:     [id number] Gender:   female   Age:    38 year old :      1980        Preoperative Diagnosis: pregnancy    Procedure(s):   SECTION - code c/s     Past Medical History:   Diagnosis Date     CARDIOVASCULAR SCREENING; LDL GOAL LESS THAN 160 2013      No past surgical history on file.                PHYSICAL EXAM:   Mental Status/Neuro:    Airway: Facies: Feasible  Mallampati: Not Assessed  Mouth/Opening: Not Assessed  TM distance: Not Assessed  Neck ROM: Not Assessed   Respiratory:    CV:    Comments:                    Lab Results   Component Value Date    WBC 13.4 (H) 2018    HGB 13.0 2018    HCT 39.1 2018     2018     2018    POTASSIUM 3.8 2018    CHLORIDE 108 2018    CO2 21 2018    BUN 8 2018    CR 0.47 (L) 2018    GLC 85 2018    CARMELA 9.3 2018    ALBUMIN 3.9 12/15/2017    PROTTOTAL 7.5 12/15/2017    ALT 24 12/15/2017    AST 14 12/15/2017    ALKPHOS 68 12/15/2017    BILITOTAL 0.4 12/15/2017    PTT 28 2016    INR 1.06 2016    FIBR 557 (H) 2016    TSH 2.08 2018    T4 1.18 2018       Preop Vitals  BP Readings from Last 3 Encounters:   18 107/72   10/08/18 130/70   18 110/70    Pulse Readings from Last 3 Encounters:   10/08/18 78   18 100   18 102      Resp Readings from Last 3 Encounters:   18 18   10/08/18 14   18 16    SpO2 Readings from Last 3 Encounters:   10/08/18 99%   18 97%   18 98%      Temp Readings from Last 1 Encounters:   18 36.7  C (98.1  F) (Oral)    Ht Readings from Last 1 Encounters:   10/08/18 1.581 m (5' 2.25\")      Wt Readings from Last 1 Encounters:   10/08/18 67.1 kg (148 lb)    Estimated body mass index is 26.85 kg/(m^2) as calculated from the following:    Height as of 10/8/18: 1.581 m (5' 2.25\").    Weight as of 10/8/18: 67.1 kg " (148 lb).     LDA:  Peripheral IV 18 Right Hand (Active)   Number of days:0       ETT (adult) 6.5 (Active)   Number of days:0       Urethral Catheter Latex 16 fr (Active)   Number of days:0            Assessment:   ASA SCORE: 2 emergent     NPO Status: Increased aspiration risk   Documentation: Deferred        Plan:   Anes. Type:  General   Pre-Induction: None     Drips/Meds-Preparation: Oxytocin   Induction:  IV (RSI)   Airway: Oral ETT   Access/Monitoring: PIV   Maintenance: Balanced   Emergence: Procedure Site   Logistics: Observation/Admission     Postop Pain/Sedation Strategy:  Standard-Options: Opioids PRN; Block SS; Exparel; IV Ketorolac     PONV Management:  Adult Risk Factors: Female, Non-Smoker, Postop Opioids  Prevention: Ondansetron     CONSENT: Direct conversation   Plan and risks discussed with: Patient        Comments for Plan/Consent:  Versed and fentanyl administered after delivery of fetus  Decompression of stomach performed  General with ETT, cords clear, easy intubation.  Discussed TAP blocks with provider and patients  who signed consent. Will perform prior to extubation.                      ANESTHESIA PREOP EVALUATION    PROCEDURE: Procedure(s):   SECTION - code c/s    HPI: Sylvain Major is a 38 year old female who presents for above procedure 2/2 mike breech presentation with foot in vagina in triage. 26wk.     PAST MEDICAL HISTORY:    Past Medical History:   Diagnosis Date     CARDIOVASCULAR SCREENING; LDL GOAL LESS THAN 160 2013       PAST SURGICAL HISTORY:    No past surgical history on file.    PAST ANESTHESIA HISTORY:     No personal or family h/o anesthesia problems    SOCIAL HISTORY:       Social History   Substance Use Topics     Smoking status: Never Smoker     Smokeless tobacco: Never Used     Alcohol use 0.0 oz/week     0 Standard drinks or equivalent per week      Comment: occ       ALLERGIES:     No Known Allergies    MEDICATIONS:      Prescriptions Prior to Admission   Medication Sig Dispense Refill Last Dose     Cholecalciferol (VITAMIN D) 2000 UNITS tablet Take 2,000 Units by mouth   12/1/2018 at Unknown time     Docusate Sodium (COLACE PO) Take by mouth 2 times daily   12/1/2018 at Unknown time     doxylamine (UNISOM) 25 MG TABS tablet Take by mouth 2 times daily   12/1/2018 at Unknown time     insulin aspart (NOVOLOG PEN) 100 UNIT/ML injection Inject 8 Units Subcutaneous daily (with dinner) Starting Friday, take normal dose (6units) again (Patient taking differently: Inject 6 Units Subcutaneous daily (with dinner) Starting Friday, take normal dose (6units) again) 3 mL 0 12/1/2018 at Unknown time     insulin aspart (NOVOLOG PEN) 100 UNIT/ML injection Inject 13 Units Subcutaneous daily (with lunch) Starting Friday, take normal dose (11units) again 3 mL 0 12/1/2018 at Unknown time     insulin isophane human (HUMULIN N PEN) 100 UNIT/ML injection Inject 30 Units Subcutaneous At Bedtime (Patient taking differently: Inject 33 Units Subcutaneous At Bedtime ) 3 mL 0 12/1/2018 at Unknown time     metFORMIN (GLUCOPHAGE) 1000 MG tablet Take 1 tablet (1,000 mg) by mouth 2 times daily (with meals) 180 tablet 3 12/1/2018 at Unknown time     Omega-3 Fatty Acids (FISH OIL PO)    12/1/2018 at Unknown time     Prenatal Vit-Fe Fumarate-FA (PRENATAL MULTIVITAMIN PLUS IRON) 27-0.8 MG TABS per tablet Take 1 tablet by mouth daily   12/1/2018 at Unknown time     progesterone (PROMETRIUM) 200 MG capsule Place 1 capsule (200 mg) vaginally daily 28 capsule 0 12/1/2018 at Unknown time     Pyridoxine HCl (VITAMIN B6 PO) Take by mouth 2 times daily   12/1/2018 at Unknown time     ACE/ARB NOT PRESCRIBED, INTENTIONAL, ACE & ARB not prescribed due to Current Pregnancy   11/12/2018 at Unknown time     aspirin 81 MG EC tablet Take 1 tablet (81 mg) by mouth daily 90 tablet 3 More than a month at Unknown time     blood glucose calibration (ONETOUCH VERIO) solution Use to  calibrate blood glucose monitor as directed. 1 each 0      cetirizine (ZYRTEC) 10 MG tablet Take 1 tablet (10 mg) by mouth every evening 30 tablet 1 More than a month at Unknown time     Glucose Blood (BLOOD GLUCOSE TEST STRIPS) STRP 1 strip by Lancet route daily 100 each 3 11/12/2018 at Unknown time     insulin pen needle (BD NANNETTE U/F) 32G X 4 MM Use 1 pen needles daily or as directed. 100 each 3 11/12/2018 at Unknown time     triamcinolone (KENALOG) 0.1 % lotion Apply topically 3 times daily 60 mL 0 More than a month at Unknown time       No current outpatient prescriptions on file.       Current Facility-Administered Medications Ordered in Epic   Medication Dose Route Frequency Provider Last Rate Last Dose     bupivacaine liposome (EXPAREL) 1.3 % LA inj susp              ceFAZolin (ANCEF) 1 g vial to attach to  ml bag for ADULT or 50 ml bag for PEDS    PRN Eve Lockhart APRN CRNA   2 g at 12/02/18 1044     ceFAZolin (ANCEF) 1 GM vial              fentaNYL (PF) (SUBLIMAZE) injection   Intravenous PRN Eve Lockhart APRN CRNA   100 mcg at 12/02/18 1046     lactated ringers infusion   Intravenous Continuous PREve Campbell APRN CRNA         methylergonovine (METHERGINE) injection   Intramuscular PRN Eve Lockhart APRN CRNA   200 mcg at 12/02/18 1048     midazolam (VERSED) injection   Intravenous PRN Eve Lockhart APRN CRNA   2 mg at 12/02/18 1047     misoprostol (CYTOTEC) 200 MCG tablet              ondansetron (ZOFRAN) injection   Intravenous PRN vEe Lockhart APRN CRNA   4 mg at 12/02/18 1057     oxytocin (PITOCIN) 30 units in 500 mL 0.9% NaCl infusion    Continuous PRN Eev Lockhart APRN CRNA 600 mL/hr at 12/02/18 1048 600 mL/hr at 12/02/18 1048     propofol (DIPRIVAN) injection 10 mg/mL vial   Intravenous PRN Eve Lockhart APRN CRNA   150 mg at 12/02/18 1040     rocuronium (ZEMURON) injection   Intravenous PRN Eve Lockhart APRN CRNA   30 mg at 12/02/18 1046      succinylcholine (ANECTINE) injection   Intravenous PRN Eve Lockhart, APRN CRNA   100 mg at 12/02/18 1040     No current Epic-ordered outpatient prescriptions on file.       PHYSICAL EXAM:    Vitals: T Data Unavailable, P Data Unavailable, BP Data Unavailable, R Data Unavailable, SpO2  , Weight   Wt Readings from Last 2 Encounters:   10/08/18 67.1 kg (148 lb)   06/02/18 62.6 kg (138 lb)       See doc flowsheet    NPO STATUS: see doc flowsheet    LABS:    BMP:  Recent Labs   Lab Test  04/13/18   1001   NA  139   POTASSIUM  3.8   CHLORIDE  108   CO2  21   BUN  8   CR  0.47*   GLC  85   CARMELA  9.3       LFTs:   Recent Labs   Lab Test  12/15/17   0752   PROTTOTAL  7.5   ALBUMIN  3.9   BILITOTAL  0.4   ALKPHOS  68   AST  14   ALT  24       CBC:   Recent Labs   Lab Test  11/12/18   1651   WBC  13.4*   RBC  4.23   HGB  13.0   HCT  39.1   MCV  92   MCH  30.7   MCHC  33.2   RDW  13.5   PLT  213       Coags:  Recent Labs   Lab Test  11/01/16   2313   INR  1.06   PTT  28   FIBR  557*       Imaging:  No orders to display       Andrew Bowers MD  Anesthesiology Staff  Pager [phone]    12/2/2018          Andrew Bowers MD

## 2018-12-02 NOTE — H&P
Labor and Delivery History and Physical    Sylvain Major MRN# [id number]   Age: 38 year old YOB: 1980     Date of Admission: 2018    Primary care provider: Janine Barrett         CC:    Water broke         HPI:   Sylvain Major is a 38 year old  at 26w4d by IVF date c/w 8w5d US here after her water broke at home. She reports a gush of clear fluid when she turned over in bed at 0300. She thought her bladder was probably full, so she decided to wait at home for a few hours to see if it happened again. Over the next several hours, she had similar gushes of clear fluid. This felt very similar to her PPROM in a prior pregnancy, so she came in for evaluation. Ms. Major reports good fetal movement and denies any cramping or contractions. She denies fever, chills, chest pain, shortness of breath, nausea, vomiting, abdominal pain, and dysuria.     OB Problem List:   1. IUP at 26w4d   2. History of PPROM at 19 weeks with di-di twin gestation. Fetal demise of Twin A and delivery at 21w5d, delivery of Twin B at 21w6d  3. Type 2 DM  4. AMA - low risk NIPT, nl MSAFP  5. Shortened cervix, most recently 25 mm with dynamic funneling on TVUS         Pregnancy history:     OBSTETRIC HISTORY:    Obstetric History       T0      L0     SAB0   TAB0   Ectopic0   Multiple0   Live Births1       # Outcome Date GA Lbr Jose/2nd Weight Sex Delivery Anes PTL Lv   2  18 26w4d   M  Gen        Name: CHARLEEN MAJOR      Apgar1:  3                Apgar5: 8   1  16 21w6d 05:13 / 00:24 0.4 kg (14.1 oz) M Vag-Breech IV REGIONAL Y ND      Name: SONIA MAJOR      Apgar1:  2                Apgar5: 1          Prenatal Labs:   Lab Results   Component Value Date    ABO O 2018    RH Pos 2018    AS Neg 2018    HEPBANG Neg 2016    CHPCRT Canceled, Test credited (A) 2018    GCPCRT Canceled, Test credited (A)  2018    HGB 11.9 2018       GBS Status:   Lab Results   Component Value Date    GBS Negative 2018       Active Problem List  Patient Active Problem List   Diagnosis     CARDIOVASCULAR SCREENING; LDL GOAL LESS THAN 160      premature rupture of membranes (PPROM) delivered, current hospitalization     Insulin controlled gestational diabetes mellitus (GDM) in second trimester     Type 2 diabetes mellitus affecting pregnancy, antepartum     Dichorionic diamniotic twin gestation      premature rupture of membranes (PPROM) with unknown onset of labor     Oligohydramnios antepartum, second trimester, fetus 1     PROM (premature rupture of membranes)     Type 2 diabetes mellitus without complication, without long-term current use of insulin (H)     Hyperlipidemia LDL goal <70     Atypical chest pain     PCOS (polycystic ovarian syndrome)     Cervical insufficiency during pregnancy in second trimester, antepartum     Cervical insufficiency during pregnancy in first trimester, antepartum     Encounter for triage in pregnant patient       Medication Prior to Admission  Prescriptions Prior to Admission   Medication Sig Dispense Refill Last Dose     Cholecalciferol (VITAMIN D) 2000 UNITS tablet Take 2,000 Units by mouth   2018 at Unknown time     Docusate Sodium (COLACE PO) Take by mouth 2 times daily   2018 at Unknown time     doxylamine (UNISOM) 25 MG TABS tablet Take by mouth 2 times daily   2018 at Unknown time     insulin aspart (NOVOLOG PEN) 100 UNIT/ML injection Inject 8 Units Subcutaneous daily (with dinner) Starting Friday, take normal dose (6units) again (Patient taking differently: Inject 6 Units Subcutaneous daily (with dinner) Starting Friday, take normal dose (6units) again) 3 mL 0 2018 at Unknown time     insulin aspart (NOVOLOG PEN) 100 UNIT/ML injection Inject 13 Units Subcutaneous daily (with lunch) Starting Friday, take normal dose (11units) again 3 mL 0  12/1/2018 at Unknown time     insulin isophane human (HUMULIN N PEN) 100 UNIT/ML injection Inject 30 Units Subcutaneous At Bedtime (Patient taking differently: Inject 33 Units Subcutaneous At Bedtime ) 3 mL 0 12/1/2018 at Unknown time     metFORMIN (GLUCOPHAGE) 1000 MG tablet Take 1 tablet (1,000 mg) by mouth 2 times daily (with meals) 180 tablet 3 12/1/2018 at Unknown time     Omega-3 Fatty Acids (FISH OIL PO)    12/1/2018 at Unknown time     Prenatal Vit-Fe Fumarate-FA (PRENATAL MULTIVITAMIN PLUS IRON) 27-0.8 MG TABS per tablet Take 1 tablet by mouth daily   12/1/2018 at Unknown time     progesterone (PROMETRIUM) 200 MG capsule Place 1 capsule (200 mg) vaginally daily 28 capsule 0 12/1/2018 at Unknown time     Pyridoxine HCl (VITAMIN B6 PO) Take by mouth 2 times daily   12/1/2018 at Unknown time     ACE/ARB NOT PRESCRIBED, INTENTIONAL, ACE & ARB not prescribed due to Current Pregnancy   11/12/2018 at Unknown time     aspirin 81 MG EC tablet Take 1 tablet (81 mg) by mouth daily 90 tablet 3 More than a month at Unknown time     blood glucose calibration (ONETOUCH VERIO) solution Use to calibrate blood glucose monitor as directed. 1 each 0      cetirizine (ZYRTEC) 10 MG tablet Take 1 tablet (10 mg) by mouth every evening 30 tablet 1 More than a month at Unknown time     Glucose Blood (BLOOD GLUCOSE TEST STRIPS) STRP 1 strip by Lancet route daily 100 each 3 11/12/2018 at Unknown time     insulin pen needle (BD NANNETTE U/F) 32G X 4 MM Use 1 pen needles daily or as directed. 100 each 3 11/12/2018 at Unknown time     triamcinolone (KENALOG) 0.1 % lotion Apply topically 3 times daily 60 mL 0 More than a month at Unknown time           Maternal Past Medical History:     Past Medical History:   Diagnosis Date     CARDIOVASCULAR SCREENING; LDL GOAL LESS THAN 160 1/2/2013                       Family History:     Family History   Problem Relation Age of Onset     Heart Disease Mother      Diabetes Father      Diabetes Paternal  Grandmother      Diabetes Paternal Grandfather             Social History:     Social History     Social History     Marital status:      Spouse name: N/A     Number of children: N/A     Years of education: N/A     Occupational History     Not on file.     Social History Main Topics     Smoking status: Never Smoker     Smokeless tobacco: Never Used     Alcohol use 0.0 oz/week     0 Standard drinks or equivalent per week      Comment: occ     Drug use: No     Sexual activity: Yes     Partners: Male     Other Topics Concern     Not on file     Social History Narrative            Review of Systems:   Negative except as noted above in the HPI         Physical Exam:     Vitals:    18 1203 18 1215 18 1230 18 1245   BP: 113/90 111/85 115/81    Resp: 26 16 11 26   Temp: 97  F (36.1  C) 97.5  F (36.4  C) 97.5  F (36.4  C)    TempSrc: Axillary Axillary Axillary    SpO2: 100% 99% 100% 100%     Gen: Alert and oriented in NAD  Cardio: Regular rate  Resp: Breathing comfortably on room air  Abdomen: gravid, soft, nontender    Bedside US: Transverse, head to maternal right. Grossly low fluid, MVP 0.5 cm. + fetal movement  Fetal Heart Rate Tracing: baseline 150 bpm, moderate variability, + accels, one variable deceleration appropriate for gestational age  [place]: quiet    SSE: Cervix visually dilated to 1 cm, cervix moving with movement of fetus. During exam, foot and leg delivered through cervix.           Assessment/Plan:   Sylvain Major is a 38 year old  at 26w4d by IVF dating admitted following PPROM in pregnancy complicated by T2DM and history of PPROM at 19 weeks in twin pregnancy. During speculum exam, the fetal foot delivered through the cervix and into the vagina. STAT c-section called. Routine postpartum cares with endocrinology consult.        Rita Montoya MD  Resident Physician-PGY3   2018, 12:01 PM     Note written after emergency delivery.    I was called for  Stat  section for PPROM with fetal foot and leg in the vagina.  Verbal consent to proceed was obtained from the patient to proceed with  delivery under GETA.    Clementine Siddiqi MD, FACOG

## 2018-12-02 NOTE — ANESTHESIA CARE TRANSFER NOTE
Patient: Sylvain Major    Procedure(s):   SECTION - code c/s    Diagnosis: pregnancy   Diagnosis Additional Information: No value filed.    Anesthesia Type:   General, RSI, Peripheral Nerve Block for post-op pain at surgeon's request     Note:  Airway :Face Mask  Patient transferred to:PACU  Handoff Report: Identifed the Patient, Identified the Reponsible Provider, Reviewed the pertinent medical history, Discussed the surgical course, Reviewed Intra-OP anesthesia mangement and issues during anesthesia, Set expectations for post-procedure period and Allowed opportunity for questions and acknowledgement of understanding      Vitals: (Last set prior to Anesthesia Care Transfer)    CRNA VITALS  2018 1132 - 2018 1232      2018             Pulse: 109    SpO2: 100 %                Electronically Signed By: PROSPER Richardson CRNA  2018  1:32 PM

## 2018-12-02 NOTE — PROGRESS NOTES
Brief Diabetes Management Team Note    Ms. Major is a 37 yo woman with a history of type 2 diabetes, who was admitted on 18 at 26w4d with PROM, now s/p STAT .  Pt is known to our service from previous admission in 2018.  Prior to this pregnancy her diabetes was well controlled on metformin 2000mg daily.  Anticipate that her insulin need will be minimal in this immediate post partum period.    Recommend-  -aspart medium intensity correction ac and hs (already ordered by OB team)  -monitor glucoses ac, hs and 0200    Full consult to follow tomorrow.    Briseyda Aponte PA-C [phone]  Diabetes Management Team Job Code [id number]

## 2018-12-02 NOTE — BRIEF OP NOTE
Brief Operative Note  Obstetrics and Gynecology    Sylvain Major  [id number]  1980      Pre-Op Diagnosis:    1. PPROM 26w4d  2. Transverse presentation, foot and leg in the vagina through the cervix  3. T2DM  4. AMA  5. Shortened cervix    Post-Op Diagnosis: same, s/p procedure below     Procedure: STAT primary classical  section via Pfannenstiel skin incision    Surgeon: Dr. Siddiqi  Assistants: Dr. Flannery, Rita Montoya, PGY3    Anesthesia: General    Fluids: 600 LR  EBL: 1038 mL  UOP: 100 mL    Findings: Liveborn male infant in transverse presentation, head to maternal right, delivered en caul. Apgars and weight pending. Grossly normal appearing uterus. Subcentimeter fibroid on posterior aspect of uterus, likely intramural. Normal appearing fallopian tubes and ovaries. Classical incision hemostatic following repair. IV pitocin and IM methergine given for poor uterine tone. Recommend  section at 36 weeks for all subsequent pregnancies.    Specimens: Placenta    Complications: None apparent    Condition: stable to PACU    Disposition: to postpartum for recovery    Rita Montoya MD  OB/GYN, PGY3  18

## 2018-12-02 NOTE — IP AVS SNAPSHOT
[facility]    [address] [facility] [address]    Phone:  [phone]                                       After Visit Summary   12/2/2018    Sylvain Major    MRN: [id number]           After Visit Summary Signature Page     I have received my discharge instructions, and my questions have been answered. I have discussed any challenges I see with this plan with the nurse or doctor.    ..........................................................................................................................................  Patient/Patient Representative Signature      ..........................................................................................................................................  Patient Representative Print Name and Relationship to Patient    ..................................................               ................................................  Date                                   Time    ..........................................................................................................................................  Reviewed by Signature/Title    ...................................................              ..............................................  Date                                               Time          22EPIC Rev 08/18

## 2018-12-02 NOTE — IP AVS SNAPSHOT
MRN:[id number]                      After Visit Summary   12/2/2018    Sylvain Major    MRN: [id number]           Thank you!     Thank you for choosing [facility] for your care. Our goal is always to provide you with excellent care. Hearing back from our patients is one way we can continue to improve our services. Please take a few minutes to complete the written survey that you may receive in the mail after you visit with us. Thank you!        Patient Information     Date Of Birth          1980        About your hospital stay     You were admitted on:  December 2, 2018 [facility]    You were discharged on:  December 4, 2018        Reason for your hospital stay       Maternity care                  Who to Call     For medical emergencies, please call 911.  For non-urgent questions about your medical care, please call your primary care provider or clinic, [phone]  For questions related to your surgery, please call your surgery clinic        Attending Provider     Provider Specialty    Jaquan Black MD Obstetrics & Gynecology, Maternal & Fetal Medicine    Clementine Siddiqi MD OB/Gyn       Primary Care Provider Office Phone # Fax #    Janine Barrett [phone]      After Care Instructions     Activity       Review discharge instructions            Diet       Resume previous diet            Discharge Instructions - Gestational diabetic patients       Gestational diabetic patients to follow up for fasting blood sugar and 2 hour 75gm glucose load at 6 weeks postpartum.            Discharge Instructions - Postpartum visit       Schedule postpartum visit with your provider and return to clinic in 6 weeks.                  Follow-up Appointments     Follow Up and recommended labs and tests       Follow up with [facility] for your routine postpartum visit in 6 weeks    Continue to check your blood sugar twice daily, one of which is  fasting.    If you persistently have elevated blood sugars greater than 160, schedule an appointment with Endocrinology sooner than 6 weeks.  Otherwise follow-up with them in 6 weeks.                  Further instructions from your care team       Postop  Birth Instructions    Activity       Do not lift more than 10 pounds for 6 weeks after surgery.  Ask family and friends for help when you need it.    No driving until you have stopped taking your pain medications (usually two weeks after surgery).    No heavy exercise or activity for 6 weeks.  Don't do anything that will put a strain on your surgery site.    Don't strain when using the toilet.  Your care team may prescribe a stool softener if you have problems with your bowel movements.     To care for your incision:       Keep the incision clean and dry.    Do not soak your incision in water. No swimming or hot tubs until it has fully healed. You may soak in the bathtub if the water level is below your incision.    Do not use peroxide, gel, cream, lotion, or ointment on your incision.    Adjust your clothes to avoid pressure on your surgery site (check the elastic in your underwear for example).     You may see a small amount of clear or pink drainage and this is normal.  Check with your health care provider:       If the drainage increases or has an odor.    If the incision reddens, you have swelling, or develop a rash.    If you have increased pain and the medicine we prescribed doesn't help.    If you have a fever above 100.4 F (38 C) with or without chills when placing thermometer under your tongue.   The area around your incision (surgery wound), will feel numb.  This is normal. The numbness should go away in less than a year.     Keep your hands clean:  Always wash your hands before touching your incision (surgery wound). This helps reduce your risk of infection. If your hands aren't dirty, you may use an alcohol hand-rub to clean your hands. Keep  your nails clean and short.    Call your healthcare provider if you have any of these symptoms:       You soak a sanitary pad with blood within 1 hour, or you see blood clots larger than a golf ball.    Bleeding that lasts more than 6 weeks.    Vaginal discharge that smells bad.    Severe pain, cramping or tenderness in your lower belly area.    A need to urinate more frequently (use the toilet more often), more urgently (use the toilet very quickly), or it burns when you urinate.    Nausea and vomiting.    Redness, swelling or pain around a vein in your leg.    Problems breastfeeding or a red or painful area on your breast.    Chest pain and cough or are gasping for air.    Problems with coping with sadness, anxiety or depression. If you have concerns about hurting yourself or the baby, call your provider immediately.      You have questions or concerns after you return home.                  Additional Information     If you use hormonal birth control (such as the pill, patch, ring or implants): You'll need a second form of birth control for 7 days (condoms, a diaphragm or contraceptive foam). While in the hospital, you received a medicine called Bridion. Your normal birth control will not work as well for a week after taking this medicine.          Pending Results     Date and Time Order Name Status Description    12/2/2018 1212 Placenta path order and indications In process             Statement of Approval     Ordered          12/04/18 1446  I have reviewed and agree with all the recommendations and orders detailed in this document.  EFFECTIVE NOW     Approved and electronically signed by:  Opal Montoya MD             Admission Information     Date & Time Provider Department Dept. Phone    12/2/2018 Clementine Siddiqi MD [facility] [phone]      Your Vitals Were     Blood Pressure Pulse Temperature Respirations Last Period Pulse Oximetry    109/69 109 98.4  F (36.9  C) (Oral) 20 05/29/2018 99%       [facility] Information     [facility] gives you secure access to your electronic health record. If you see a primary care provider, you can also send messages to your care team and make appointments. If you have questions, please call your primary care clinic.  If you do not have a primary care provider, please call [phone] and they will assist you.        Care EveryWhere ID     This is your Care EveryWhere ID. This could be used by other organizations to access your [facility] medical records  [id number]        Equal Access to Services     JOVON MURILLO : Hadii aad ku hadasho Soomaali, waaxda luqadaha, qaybta kaalmada adeegyada, alina manrique. So [facility] [phone].    ATENCIÓN: Si habla español, tiene a galvez disposición servicios gratuitos de asistencia lingüística. [facility] [phone].    We comply with applicable federal civil rights laws and [place] laws. We do not discriminate on the basis of race, color, national origin, age, disability, sex, sexual orientation, or gender identity.               Review of your medicines      START taking        Dose / Directions    acetaminophen 325 MG tablet   Commonly known as:  TYLENOL        Dose:  325-650 mg   Take 1-2 tablets (325-650 mg) by mouth every 6 hours as needed for mild pain   Quantity:  60 tablet   Refills:  0       ibuprofen 600 MG tablet   Commonly known as:  ADVIL/MOTRIN        Dose:  600 mg   Take 1 tablet (600 mg) by mouth every 6 hours as needed for moderate pain   Quantity:  30 tablet   Refills:  1       oxyCODONE 5 MG tablet   Commonly known as:  ROXICODONE        Dose:  5 mg   Take 1 tablet (5 mg) by mouth every 6 hours as needed for severe pain   Quantity:  14 tablet   Refills:  0         CONTINUE these medicines which have NOT CHANGED        Dose / Directions    ACE/ARB/ARNI NOT PRESCRIBED   Commonly known as:  INTENTIONAL        ACE & ARB not prescribed due to Current Pregnancy   Refills:  0       aspirin 81 MG EC  tablet   Commonly known as:  ASA   Used for:  Type 2 diabetes mellitus without complication, without long-term current use of insulin (H)        Dose:  81 mg   Take 1 tablet (81 mg) by mouth daily   Quantity:  90 tablet   Refills:  3       blood glucose calibration solution   Used for:  Type 2 diabetes mellitus affecting pregnancy, antepartum        Use to calibrate blood glucose monitor as directed.   Quantity:  1 each   Refills:  0       BLOOD GLUCOSE TEST STRIPS Strp   Used for:  Type 2 diabetes mellitus without complication, without long-term current use of insulin (H)        Dose:  1 strip   1 strip by Lancet route daily   Quantity:  100 each   Refills:  3       cetirizine 10 MG tablet   Commonly known as:  zyrTEC   Used for:  Allergic contact dermatitis, unspecified trigger, Itching        Dose:  10 mg   Take 1 tablet (10 mg) by mouth every evening   Quantity:  30 tablet   Refills:  1       COLACE PO        Take by mouth 2 times daily   Refills:  0       FISH OIL PO        Refills:  0       insulin pen needle 32G X 4 MM miscellaneous   Commonly known as:  BD NANNETTE U/F   Used for:  Type 2 diabetes mellitus without complication, unspecified long term insulin use status        Use 1 pen needles daily or as directed.   Quantity:  100 each   Refills:  3       prenatal multivitamin w/iron 27-0.8 MG tablet        Dose:  1 tablet   Take 1 tablet by mouth daily   Refills:  0       triamcinolone 0.1 % external lotion   Commonly known as:  KENALOG   Used for:  Allergic contact dermatitis, unspecified trigger, Itching        Apply topically 3 times daily   Quantity:  60 mL   Refills:  0       vitamin D3 2000 units tablet   Commonly known as:  CHOLECALCIFEROL        Dose:  2000 Units   Take 2,000 Units by mouth   Refills:  0         STOP taking     doxylamine 25 MG Tabs tablet   Commonly known as:  UNISOM           insulin aspart 100 UNIT/ML pen   Commonly known as:  NovoLOG PEN           insulin isophane human 100 UNIT/ML  "injection   Commonly known as:  HumuLIN N PEN           metFORMIN 1000 MG tablet   Commonly known as:  GLUCOPHAGE           progesterone 200 MG capsule   Commonly known as:  PROMETRIUM           VITAMIN B6 PO                Where to get your medicines      These medications were sent to [facility] [place], [place] - [address]  [address], [facility] [address]     Phone:  [phone]     acetaminophen 325 MG tablet    ibuprofen 600 MG tablet         Some of these will need a paper prescription and others can be bought over the counter. Ask your nurse if you have questions.     Bring a paper prescription for each of these medications     oxyCODONE 5 MG tablet                Protect others around you: Learn how to safely use, store and throw away your medicines at www.disposemymeds.org.        Information about your nerve block     Today you received a block to numb the nerves near your surgery site.    This is a block using local anesthetic or \"numbing\" medication injected around the nerves to anesthetize or \"numb\" the area supplied by those nerves. This block is injected into the muscle layer near your surgical site. The type of anesthesia (Exparel) your anesthesia team used to numb your abdomen may give you relief for up to 72 hours.     Diet: There are no diet restrictions, but you should drink plenty of fluids, unless you are on a fluid-restricted diet.     Activity: If your surgical site is an arm or leg you should be careful with your affected limb, since it is possible to injure your limb without being aware of it due to the numbing. Until full feeling returns, you should guard against bumping or hitting your limb, and avoid extreme hot or cold temperatures on the skin.    Pain Medication: As the block wears off, the feeling will return as a tingling or prickly sensation near your surgical site. You will experience more discomfort from your incisions as the feeling returns. " You may want to take a pain pill (a narcotic or Tylenol if this was prescribed by your surgeon) when you start to experience mild pain, before the pain becomes more severe. If your pain medications do not control your pain, you should notify your surgeon. If you are taking narcotics for pain management, do not drink alcohol, drive a car, or perform hazardous activities.  If you have questions or concerns you may call your surgeon at the number provided with your discharge instructions.     Call your surgeon if you experience blurry vision, ringing in the ears or metallic taste in your mouth.         Information about OPIOIDS     PRESCRIPTION OPIOIDS: WHAT YOU NEED TO KNOW   We gave you an opioid (narcotic) pain medicine. It is important to manage your pain, but opioids are not always the best choice. You should first try all the other options your care team gave you. Take this medicine for as short a time (and as few doses) as possible.    Some activities can increase your pain, such as bandage changes or therapy sessions. It may help to take your pain medicine 30 to 60 minutes before these activities. Reduce your stress by getting enough sleep, working on hobbies you enjoy and practicing relaxation or meditation. Talk to your care team about ways to manage your pain beyond prescription opioids.    These medicines have risks:    DO NOT drive when on new or higher doses of pain medicine. These medicines can affect your alertness and reaction times, and you could be arrested for driving under the influence (DUI). If you need to use opioids long-term, talk to your care team about driving.    DO NOT operate heavy machinery    DO NOT do any other dangerous activities while taking these medicines.    DO NOT drink any alcohol while taking these medicines.     If the opioid prescribed includes acetaminophen, DO NOT take with any other medicines that contain acetaminophen. Read all labels carefully. Look for the word   acetaminophen  or  Tylenol.  Ask your pharmacist if you have questions or are unsure.    You can get addicted to pain medicines, especially if you have a history of addiction (chemical, alcohol or substance dependence). Talk to your care team about ways to reduce this risk.    All opioids tend to cause constipation. Drink plenty of water and eat foods that have a lot of fiber, such as fruits, vegetables, prune juice, apple juice and high-fiber cereal. Take a laxative (Miralax, milk of magnesia, Colace, Senna) if you don t move your bowels at least every other day. Other side effects include upset stomach, sleepiness, dizziness, throwing up, tolerance (needing more of the medicine to have the same effect), physical dependence and slowed breathing.    Store your pills in a secure place, locked if possible. We will not replace any lost or stolen medicine. If you don t finish your medicine, please throw away (dispose) as directed by your pharmacist. The [place] Pollution [employer] has more information about safe disposal: https://www.pca.[facility].[place].us/living-green/managing-unwanted-medications             Medication List: This is a list of all your medications and when to take them. Check marks below indicate your daily home schedule. Keep this list as a reference.      Medications           Morning Afternoon Evening Bedtime As Needed    ACE/ARB/ARNI NOT PRESCRIBED   Commonly known as:  INTENTIONAL   ACE & ARB not prescribed due to Current Pregnancy                                acetaminophen 325 MG tablet   Commonly known as:  TYLENOL   Take 1-2 tablets (325-650 mg) by mouth every 6 hours as needed for mild pain   Last time this was given:  975 mg on 12/4/2018  5:08 AM                                aspirin 81 MG EC tablet   Commonly known as:  ASA   Take 1 tablet (81 mg) by mouth daily                                blood glucose calibration solution   Use to calibrate blood glucose monitor as directed.                                 BLOOD GLUCOSE TEST STRIPS Strp   1 strip by Lancet route daily                                cetirizine 10 MG tablet   Commonly known as:  zyrTEC   Take 1 tablet (10 mg) by mouth every evening                                COLACE PO   Take by mouth 2 times daily   Last time this was given:  100 mg on 12/4/2018  8:35 AM                                FISH OIL PO                                ibuprofen 600 MG tablet   Commonly known as:  ADVIL/MOTRIN   Take 1 tablet (600 mg) by mouth every 6 hours as needed for moderate pain   Last time this was given:  600 mg on 12/4/2018  8:35 AM                                insulin pen needle 32G X 4 MM miscellaneous   Commonly known as:  BD NANNETTE U/F   Use 1 pen needles daily or as directed.                                oxyCODONE 5 MG tablet   Commonly known as:  ROXICODONE   Take 1 tablet (5 mg) by mouth every 6 hours as needed for severe pain   Last time this was given:  10 mg on 12/3/2018  8:15 AM                                prenatal multivitamin w/iron 27-0.8 MG tablet   Take 1 tablet by mouth daily                                triamcinolone 0.1 % external lotion   Commonly known as:  KENALOG   Apply topically 3 times daily                                vitamin D3 2000 units tablet   Commonly known as:  CHOLECALCIFEROL   Take 2,000 Units by mouth

## 2018-12-02 NOTE — PLAN OF CARE
Pt arrived to triage at 0922 complaining of leaking fluid since 0300.  Triage assessments performed, fetal heart tracing appeared appropriate for GA, uterine was soft and no UCs were noted on toco.  Afebrile.  Pt denied any cramping, eliana, pain, vaginal bleeding.  ROM+ done and sent to lab, Dr Montoya notified of patient arrival and complaints and arrived to bedside at 1015, bedside US performed and fetus noted to be transverse.  Speculum exam performed and fetal foot visualized to be coming through the cervix at 1031, decision to call code c/s at 1032 and code called.  Arrived to OR at 1035, incision at 1042, birth at 1046.  No brief, fire assessment, timeout performed due to urgency of code situation.  Surgery went well per MDs, QLEORA in OR 1032.

## 2018-12-03 LAB
GLUCOSE BLDC GLUCOMTR-MCNC: 112 MG/DL (ref 70–99)
GLUCOSE BLDC GLUCOMTR-MCNC: 113 MG/DL (ref 70–99)
GLUCOSE BLDC GLUCOMTR-MCNC: 121 MG/DL (ref 70–99)
GLUCOSE BLDC GLUCOMTR-MCNC: 127 MG/DL (ref 70–99)
HGB BLD-MCNC: 10.4 G/DL (ref 11.7–15.7)
T PALLIDUM AB SER QL: NONREACTIVE

## 2018-12-03 PROCEDURE — 12000028 ZZH R&B OB UMMC

## 2018-12-03 PROCEDURE — 25000132 ZZH RX MED GY IP 250 OP 250 PS 637: Performed by: INTERNAL MEDICINE

## 2018-12-03 PROCEDURE — 25000128 H RX IP 250 OP 636: Performed by: STUDENT IN AN ORGANIZED HEALTH CARE EDUCATION/TRAINING PROGRAM

## 2018-12-03 PROCEDURE — 25000128 H RX IP 250 OP 636: Performed by: INTERNAL MEDICINE

## 2018-12-03 PROCEDURE — 85018 HEMOGLOBIN: CPT | Performed by: INTERNAL MEDICINE

## 2018-12-03 PROCEDURE — 00000146 ZZHCL STATISTIC GLUCOSE BY METER IP

## 2018-12-03 PROCEDURE — 36415 COLL VENOUS BLD VENIPUNCTURE: CPT | Performed by: INTERNAL MEDICINE

## 2018-12-03 RX ORDER — SODIUM CHLORIDE, SODIUM LACTATE, POTASSIUM CHLORIDE, CALCIUM CHLORIDE 600; 310; 30; 20 MG/100ML; MG/100ML; MG/100ML; MG/100ML
INJECTION, SOLUTION INTRAVENOUS CONTINUOUS
Status: DISCONTINUED | OUTPATIENT
Start: 2018-12-03 | End: 2018-12-04 | Stop reason: HOSPADM

## 2018-12-03 RX ADMIN — SODIUM CHLORIDE, POTASSIUM CHLORIDE, SODIUM LACTATE AND CALCIUM CHLORIDE: 600; 310; 30; 20 INJECTION, SOLUTION INTRAVENOUS at 23:48

## 2018-12-03 RX ADMIN — OXYCODONE HYDROCHLORIDE 10 MG: 5 TABLET ORAL at 04:53

## 2018-12-03 RX ADMIN — SODIUM CHLORIDE, POTASSIUM CHLORIDE, SODIUM LACTATE AND CALCIUM CHLORIDE 500 ML: 600; 310; 30; 20 INJECTION, SOLUTION INTRAVENOUS at 19:32

## 2018-12-03 RX ADMIN — DOCUSATE SODIUM 100 MG: 100 CAPSULE, LIQUID FILLED ORAL at 08:15

## 2018-12-03 RX ADMIN — OXYCODONE HYDROCHLORIDE 10 MG: 5 TABLET ORAL at 08:15

## 2018-12-03 RX ADMIN — PROCHLORPERAZINE EDISYLATE 10 MG: 5 INJECTION INTRAMUSCULAR; INTRAVENOUS at 14:41

## 2018-12-03 RX ADMIN — ACETAMINOPHEN 975 MG: 325 TABLET, FILM COATED ORAL at 08:15

## 2018-12-03 RX ADMIN — PROCHLORPERAZINE EDISYLATE 10 MG: 5 INJECTION INTRAMUSCULAR; INTRAVENOUS at 22:08

## 2018-12-03 RX ADMIN — SODIUM CHLORIDE, POTASSIUM CHLORIDE, SODIUM LACTATE AND CALCIUM CHLORIDE 125 ML/HR: 600; 310; 30; 20 INJECTION, SOLUTION INTRAVENOUS at 20:40

## 2018-12-03 RX ADMIN — ONDANSETRON HYDROCHLORIDE 4 MG: 2 INJECTION INTRAMUSCULAR; INTRAVENOUS at 11:25

## 2018-12-03 RX ADMIN — KETOROLAC TROMETHAMINE 30 MG: 30 INJECTION, SOLUTION INTRAMUSCULAR at 06:24

## 2018-12-03 RX ADMIN — ONDANSETRON HYDROCHLORIDE 4 MG: 2 INJECTION INTRAMUSCULAR; INTRAVENOUS at 17:19

## 2018-12-03 NOTE — PROGRESS NOTES
Post  Anesthesia Follow Up Note    Patient: Sylvain Major    Patient location: Postpartum floor.    Chief complaint: Acute postoperative pain management s/p general anesthesia for stat C/S, and TAP block     Procedure(s) Performed:  Procedure(s):   SECTION - code c/s    Anesthesia type: General    Subjective:     Pain Control: 1/10 at rest and 2/10 with ambulation    Additional ROS:  She does not complain of pruritis at this time. She denies weakness, denies paresthesia, denies difficulties breathing or voiding, denies nausea or vomiting. She is able to ambulate and tolerates regular diet.    Objective:    Respiratory Function (RR / SpO2 / Airway Patency): Satisfactory    Cardiac Function (HR / Rhythm / BP): Satisfactory    Strength and sensation lower extremities: Normal    Site of spinal/epidural insertion: No signs of infection or inflammation.     Last Vitals: /72  Temp 36.8  C (98.3  F) (Oral)  Resp 16  LMP 2018  SpO2 100%  Breastfeeding? Unknown    Assessment and plan:   Sylvain Major is a 38 year old female  POD #1 s/p   SECTION under general anesthesia, and single shot TAP nerve block injections with 20 mL bupivacaine 0.25% with epinephrine 1:200,000, then 20mL liposome bupivacaine (Exparel) long-acting 1.3% given in the PACU for postoperative analgesia.  Pt is ambulating without difficulty, no weakness or paresthesias.  There is no evidence of adverse side effects associated with nerve block injections.  The patient is receiving adequate incisional pain control at this time and anticipate up to 72 hours of incisional pain control.  However, we further anticipate that the patient will require opioid/nonopioid analgesics for visceral and muscle pain that is not controlled with local anesthetic.      In brief summary, her post-operative analgesia is adequate today. Further interventional analgesic strategies would be of little utility at this  time. Thus, we recommend proceeding with PO analgesics including staggered dosing of NSAIDs (ibuprofen) and acetaminophen, with a taper of oxycodone.     Thank you for including us in the care for this patient.    Dinorah Sierra MD CA-1

## 2018-12-03 NOTE — PROVIDER NOTIFICATION
12/03/18 1424   Provider Notification   Provider Name/Title Dr. Opal Montoya G2   Method of Notification Electronic Page   Request Evaluate-Remote   Notification Reason Status Update     She is still feeling nauseated despite Zofran, aromatherapy. Passing gas, feels like BM soon. Had emesis x2 and thinks that was all her stomach contents. Can't tolerate any PO and feeling pretty miserable. Any suggestions- try different medication?    Addendum at 1440: Per Dr. Childs: RN to put in order for NPO, administer Compazine. Continue to follow up with provider w/updates.

## 2018-12-03 NOTE — PLAN OF CARE
Problem: Patient Care Overview  Goal: Plan of Care/Patient Progress Review  Outcome: Therapy, progress towards functional goals is fair  VSS ex nausea/vomiting. She is up ad catie, voiding WNL after erickson removal. Was able to eat breakfast however then reported nausea around 10:45 AM, emesis at 11:00. Zofran IV administered, 1 hour later Pt had another large emesis. Aromatherapy tumease applied as well. Pt reports still slightly nauseated. She has not felt well to pump today. Incision dressing is C/D/I, no s/s infection. Not passing gas however bowel sounds are audible. Will continue to watch Pt closely and assess GI symptoms.

## 2018-12-03 NOTE — PROVIDER NOTIFICATION
12/03/18 1246   Provider Notification   Provider Name/Title Dr. Opal Montoya G2   Method of Notification Electronic Page   Request Evaluate-Remote   Notification Reason Status Update     FYI Pt had 2 episodes of emesis. I gave Zofran 4mg IV. I will keep you updated if it continues.

## 2018-12-03 NOTE — PLAN OF CARE
Problem: Patient Care Overview  Goal: Plan of Care/Patient Progress Review  Outcome: Improving  Postpartum stable. VSS. Obrien is patent and draining adequate amounts. Incision is c/d/i. Tolerating diet. PIV SL. Pain is tolerable with oxy, toradol and tylenol. Ambulated to the bathroom with SBA. Pumping q3-4 hours and hand expression, no drops yet. BG's were 121 and . Encouraged pt to rest when able. Continue to provide assistance and support as needed. Continue plan of care.

## 2018-12-03 NOTE — PROGRESS NOTES
Brief Diabetes Management Team Note    I was unable to complete consult today b/c Ms. Major was severely nauseated and declined meeting (no improvement with Zofran).  Her glucoses thus far are near to normal range, and anticipate that she will not require any insulin at discharge if current trend continues.  If glucoses are trending higher after discharge could consider restarting metformin, which she remained on throughout her pregnancy.    We will page to discuss plan with primary team tomorrow.    Briseyda Aponte PA-C [phone]  Diabetes Management Team Job Code [id number]

## 2018-12-03 NOTE — ANESTHESIA POSTPROCEDURE EVALUATION
Anesthesia POST Procedure Evaluation    Patient: Sylvain Major   MRN:     [id number] Gender:   female   Age:    38 year old :      1980        Preoperative Diagnosis: pregnancy    Procedure(s):   SECTION - code c/s   Postop Comments: No value filed.       Anesthesia Type:  General    Reportable Event: NO     PAIN: Uncomplicated   Sign Out status: Comfortable, Well controlled pain     PONV: No PONV   Sign Out status:  No Nausea or Vomiting     Neuro/Psych: Uneventful perioperative course   Sign Out Status: Preoperative baseline; Age appropriate mentation     Airway/Resp.: Uneventful perioperative course   Sign Out Status: Airway Device present     CV: Uneventful perioperative course   Sign Out status: Appropriate BP and perfusion indices; Appropriate HR/Rhythm     Disposition:   Sign Out in:  PACU  Disposition:  Phase II; Home  Recovery Course: Uneventful  Follow-Up: Not required           Last Anesthesia Record Vitals:  CRNA VITALS  2018 1132 - 2018 1232      2018             Pulse: 109    SpO2: 100 %          Last PACU/Preop Vitals:  Vitals:    18 1540 18 1645 18 1745   BP: 118/78 112/71 110/65   Resp: 16 16 16   Temp: 36.5  C (97.7  F) 37  C (98.6  F) 36.9  C (98.5  F)   SpO2: 100% 100% 100%         Electronically Signed By: Andrew Bowers MD, 2018

## 2018-12-03 NOTE — ANESTHESIA PROCEDURE NOTES
Peripheral Nerve Block Procedure Note    Staff:     Anesthesiologist:  AICHA ADAMS  Location: OB and OR AFTER induction  Procedure Start/Stop TImes:      12/2/2018 11:50 AM     12/2/2018 11:55 AM    patient identified, IV checked, site marked, risks and benefits discussed, informed consent, monitors and equipment checked, pre-op evaluation, at physician/surgeon's request and post-op pain management      Correct Patient: Yes      Correct Position: Yes      Correct Site: Yes      Correct Procedure: Yes      Correct Laterality:  Yes    Site Marked:  Yes  Procedure details:     Procedure:  TAP    ASA:  2 and Emergent    Diagnosis:  Code c/s    Position:  Supine    Sterile Prep: chloraprep, mask and sterile gloves      Needle:  Short bevel    Needle gauge:  21    Needle length (mm):  110    Ultrasound: Yes      Ultrasound used to identify targeted nerve, plexus, or vascular structure and placed a needle adjacent to it      Permanent Image entered into patiient's record      Abnormal pain on injection: No      Blood Aspirated: No      Paresthesias:  No    Bleeding at site: No      Bolus via:  Needle    Infusion Method:  Single Shot    Complications:  None  Assessment/Narrative:     Injection made incrementally with aspirations every (mL):  5     Bilateral classical TAP blocks (10ml exparel + 10ml 0.25% bupivacaine w 1:200:000 epi into each TAP site)

## 2018-12-03 NOTE — DISCHARGE SUMMARY
"[facility] Discharge Summary    Sylvain Major MRN# [id number]   Age: 38 year old YOB: 1980     Date of Admission:  2018  Date of Discharge:  18  Admitting Physician:  Clementine Siddiqi MD  Discharge Physician:  Cinthya Ulloa MD    Admit Dx:   1. IUP at 26w4d   2. History of PPROM at 19 weeks with di-di twin gestation. Fetal demise of Twin A and delivery at 21w5d, delivery of Twin B at 21w6d  3. Type 2 DM  4. AMA - low risk NIPT, nl MSAFP  5. Shortened cervix, most recently 25 mm with dynamic funneling on TVUS  6. PPROM    Discharge Dx:  - Transverse presentation with foot in vagina  - Same as above, s/p stat primary classical  section    Procedures:  - stat primary classical  section via Pfannenstiel incision - PATIENT SHOULD NOT LABOR  - general anesthesia    Admit HPI:  \"Sylvain Major is a 38 year old  at 26w4d by IVF date c/w 8w5d US here after her water broke at home. She reports a gush of clear fluid when she turned over in bed at 0300. She thought her bladder was probably full, so she decided to wait at home for a few hours to see if it happened again. Over the next several hours, she had similar gushes of clear fluid. This felt very similar to her PPROM in a prior pregnancy, so she came in for evaluation. Ms. Major reports good fetal movement and denies any cramping or contractions. She denies fever, chills, chest pain, shortness of breath, nausea, vomiting, abdominal pain, and dysuria.\"    Please see her admit H&P for full details of her PMH, PSH, Meds, Allergies and exam on admit.    During the exam the fetus's foot kicked through the cervix and a stat  was called. She had received betamethasone roughly 2 weeks prior when she was found to have a short cervix.     Operative Course:  Surgery was uncomplicated. EBL from the delivery was 1038cc. Please see her  Section Operative Note for " "full details regarding her delivery.    Operative Findings:   \"Liveborn male infant in transverse presentation, head to maternal right, delivered en caul. Apgars 3, 8, and 8 at 1, 5 and 10 minutes. Weight 920 kg. Grossly normal appearing uterus. Subcentimeter fibroid on posterior aspect of uterus, likely intramural. Normal appearing fallopian tubes and ovaries. Classical incision hemostatic following repair. IV pitocin and IM methergine given for poor uterine tone. Recommend  section at 36 weeks for all subsequent pregnancies.\"    Postoperative Course:  Her postoperative course was uncomplicated. On POD#2, she was meeting all of her postpartum goals and deemed stable for discharge. She was voiding without difficulty, tolerating a regular diet without nausea and vomiting, her pain was well controlled on oral pain medicines and her lochia was appropriate. Her hemoglobin prior to delivery was 11.9 and after delivery was 10.4. Her Rh status was positive and Rhogam was not indicated.  Endocrinology reviewed her chart and determined she does not need to be discharged with any diabetic medications.  They recommended routine 2hr GTT at 6w postpartum visit.     Discharge Medications:  Current Discharge Medication List      START taking these medications    Details   acetaminophen (TYLENOL) 325 MG tablet Take 1-2 tablets (325-650 mg) by mouth every 6 hours as needed for mild pain  Qty: 60 tablet, Refills: 0    Associated Diagnoses: S/P  section      ibuprofen (ADVIL/MOTRIN) 600 MG tablet Take 1 tablet (600 mg) by mouth every 6 hours as needed for moderate pain  Qty: 30 tablet, Refills: 1    Associated Diagnoses: S/P  section      oxyCODONE (ROXICODONE) 5 MG tablet Take 1 tablet (5 mg) by mouth every 6 hours as needed for severe pain  Qty: 14 tablet, Refills: 0    Associated Diagnoses: S/P  section         CONTINUE these medications which have NOT CHANGED    Details   Cholecalciferol (VITAMIN D) " 2000 UNITS tablet Take 2,000 Units by mouth      Docusate Sodium (COLACE PO) Take by mouth 2 times daily      Omega-3 Fatty Acids (FISH OIL PO)       Prenatal Vit-Fe Fumarate-FA (PRENATAL MULTIVITAMIN PLUS IRON) 27-0.8 MG TABS per tablet Take 1 tablet by mouth daily      ACE/ARB NOT PRESCRIBED, INTENTIONAL, ACE & ARB not prescribed due to Current Pregnancy      aspirin 81 MG EC tablet Take 1 tablet (81 mg) by mouth daily  Qty: 90 tablet, Refills: 3    Associated Diagnoses: Type 2 diabetes mellitus without complication, without long-term current use of insulin (H)      blood glucose calibration (ONETOUCH VERIO) solution Use to calibrate blood glucose monitor as directed.  Qty: 1 each, Refills: 0    Associated Diagnoses: Type 2 diabetes mellitus affecting pregnancy, antepartum      cetirizine (ZYRTEC) 10 MG tablet Take 1 tablet (10 mg) by mouth every evening  Qty: 30 tablet, Refills: 1    Associated Diagnoses: Allergic contact dermatitis, unspecified trigger; Itching      Glucose Blood (BLOOD GLUCOSE TEST STRIPS) STRP 1 strip by Lancet route daily  Qty: 100 each, Refills: 3    Comments: One touch ultra 2.  Associated Diagnoses: Type 2 diabetes mellitus without complication, without long-term current use of insulin (H)      insulin pen needle (BD NANNETTE U/F) 32G X 4 MM Use 1 pen needles daily or as directed.  Qty: 100 each, Refills: 3    Associated Diagnoses: Type 2 diabetes mellitus without complication, unspecified long term insulin use status      triamcinolone (KENALOG) 0.1 % lotion Apply topically 3 times daily  Qty: 60 mL, Refills: 0    Associated Diagnoses: Allergic contact dermatitis, unspecified trigger; Itching         STOP taking these medications       doxylamine (UNISOM) 25 MG TABS tablet Comments:   Reason for Stopping:         insulin aspart (NOVOLOG PEN) 100 UNIT/ML injection Comments:   Reason for Stopping:         insulin aspart (NOVOLOG PEN) 100 UNIT/ML injection Comments:   Reason for Stopping:          insulin isophane human (HUMULIN N PEN) 100 UNIT/ML injection Comments:   Reason for Stopping:         metFORMIN (GLUCOPHAGE) 1000 MG tablet Comments:   Reason for Stopping:         progesterone (PROMETRIUM) 200 MG capsule Comments:   Reason for Stopping:         Pyridoxine HCl (VITAMIN B6 PO) Comments:   Reason for Stopping:             Discharge/Disposition:  Sylvain Major was discharged to home in stable condition with the following instructions/medications:  1) Call for temperature > 100.4, bright red vaginal bleeding >1 pad an hour x 2 hours, foul smelling vaginal discharge, pain not controlled by usual oral pain meds, persistent nausea and vomiting not controlled on medications, drainage or redness from incision site  2) She desired nothing for contraception given this was an IVF pregnancy  3) For feeding she decided to pump while baby was in the NICU.  4) She was instructed to follow-up with her primary OB in 6 weeks for a routine postpartum visit and 2hr GTT.  5) Discharge activity:  No heavy lifting >15 lbs or strenuous activity for 6 weeks, pelvic rest for 6 weeks, no driving or operating machinery while on narcotics.    Opal Montoya MD  OBGYN Resident PGY2  12/04/2018 2:45 PM    OB/GYN Staff -- Pt seen and examined by me. Agree with note as above.  MD Toni

## 2018-12-03 NOTE — PROGRESS NOTES
Post Partum Progress Note    POD#1 from stat PCCS.     Subjective: reports overall doing well. Pain well controlled on toradol, tylenol, and oxycodone. Tolerating PO with no nausea/vomiting. Obrien removed recently, awaiting void. Has not ambulated yet. Bleeding is like a light period. Has not passed gas yet. Pumping but does not have milk yet.     Denies any fever, chills, SOB, chest pain, N/V,  headache, dizziness.       Objective:  Patient Vitals for the past 24 hrs:   BP Temp Temp src Heart Rate Resp SpO2   18 2349 119/81 99.3  F (37.4  C) Oral 110 16 99 %   18 1945 119/74 98.3  F (36.8  C) Oral 108 16 100 %   18 1845 110/82 98.6  F (37  C) Oral 109 16 100 %   18 1745 110/65 98.5  F (36.9  C) Oral 114 16 100 %   18 1645 112/71 98.6  F (37  C) Oral 120 16 100 %   18 1540 118/78 97.7  F (36.5  C) Oral 110 16 100 %   18 1500 113/73 97.9  F (36.6  C) Oral 112 18 100 %   18 1400 - - - 112 14 100 %   18 1345 104/70 - - 106 18 100 %   18 1330 104/69 - - 104 18 100 %   18 1315 107/77 - - 98 18 100 %   18 1300 110/78 - - 98 18 100 %   18 1245 103/59 - - 104 26 100 %   18 1230 115/81 97.5  F (36.4  C) Axillary 100 11 100 %   18 1215 111/85 97.5  F (36.4  C) Axillary 110 16 99 %   18 1203 113/90 97  F (36.1  C) Axillary - 26 100 %   18 1000 - 98.5  F (36.9  C) Oral - - -       UOP: ~100cc/hr overnight    General: AAOx3, NAD, appears generally well  CV:  RRR, no murmurs, rubs  Resp:  No increased work of breathing.   Abd:  Soft, nontender, nondistended, fundus firm at approximately 2 cm below umbilicus  Inc: c/d/i with bandage  Ext: minimal edema in bilateral LE    Hgb 11.9 > EBL 1038cc > am pending     -131    Assessment and Plan:  38 year old  on POD #1 s/p PCCS for PPROM and foot presentation.  Recovering appropriately, baby doing well in NICU.     Post op: pain:Well-controlled with sched tylenol, toradol,  PRN oxy. Transition to ibuprofen today  - encouraged ambulation    Type II DM: Controlled on Metformin 2000mg daily during pregnancy   - blood glucose checks QACHS, MSSI, endocrine to consult today   Hgb: Hgb as above. VSS, UOP adequate, asymptomatic.   GI:  BID Senna/Colace.  PRN Simethicone.   PPx:  Encouraged ambulation   Rh: Positive  Rubella: Immune  Feed: pumping  BC: IVF pregnancy, declines  Dispo: Plan for home on POD#2-3.      Valerie Saenz, PGY3  1:50 AM, 12/3/2018      [facility] staff:  Appreciate note by Dr. Saenz.  I have seen and examined the patient without the resident. I have reviewed, edited, and agree with the note.      Bettina Childs MD  12/3/2018  8:12 AM

## 2018-12-03 NOTE — LACTATION NOTE
D:  I met with Sylvain in her postpartum room today. Mac is her first baby.  Sylvain has Type II DM, she said she takes no meds.  She has no history of breast/chest surgery or trauma.  Per her chart, she has PCOS.  She has already started to pump.    I:  I gave her a folder of introductory materials and went over pumping guidelines.    We talked about hands on pumping techniques, hand expression and how to access the [place] websites. During our discussion, she became too nauseated to continue.  I checked back later, and she requested we resume tomorrow.  A:  Mom has some information, will finish consult tomorrow.  P:  Will continue to provide lactation support.       Cherelle Abraham, RNC, IBCLC

## 2018-12-04 VITALS
SYSTOLIC BLOOD PRESSURE: 109 MMHG | HEART RATE: 109 BPM | DIASTOLIC BLOOD PRESSURE: 69 MMHG | OXYGEN SATURATION: 99 % | RESPIRATION RATE: 20 BRPM | TEMPERATURE: 98.4 F

## 2018-12-04 LAB — GLUCOSE BLDC GLUCOMTR-MCNC: 103 MG/DL (ref 70–99)

## 2018-12-04 PROCEDURE — 25000128 H RX IP 250 OP 636: Performed by: STUDENT IN AN ORGANIZED HEALTH CARE EDUCATION/TRAINING PROGRAM

## 2018-12-04 PROCEDURE — 00000146 ZZHCL STATISTIC GLUCOSE BY METER IP

## 2018-12-04 PROCEDURE — 25000132 ZZH RX MED GY IP 250 OP 250 PS 637: Performed by: INTERNAL MEDICINE

## 2018-12-04 RX ORDER — ACETAMINOPHEN 325 MG/1
325-650 TABLET ORAL EVERY 6 HOURS PRN
Qty: 60 TABLET | Refills: 0 | Status: SHIPPED | OUTPATIENT
Start: 2018-12-04 | End: 2021-01-20

## 2018-12-04 RX ORDER — IBUPROFEN 600 MG/1
600 TABLET, FILM COATED ORAL EVERY 6 HOURS PRN
Qty: 30 TABLET | Refills: 1 | Status: SHIPPED | OUTPATIENT
Start: 2018-12-04 | End: 2021-01-20

## 2018-12-04 RX ORDER — OXYCODONE HYDROCHLORIDE 5 MG/1
5 TABLET ORAL EVERY 6 HOURS PRN
Qty: 14 TABLET | Refills: 0 | Status: SHIPPED | OUTPATIENT
Start: 2018-12-04 | End: 2018-12-24

## 2018-12-04 RX ADMIN — SODIUM CHLORIDE, POTASSIUM CHLORIDE, SODIUM LACTATE AND CALCIUM CHLORIDE: 600; 310; 30; 20 INJECTION, SOLUTION INTRAVENOUS at 08:33

## 2018-12-04 RX ADMIN — IBUPROFEN 600 MG: 600 TABLET ORAL at 02:25

## 2018-12-04 RX ADMIN — DOCUSATE SODIUM 100 MG: 100 CAPSULE, LIQUID FILLED ORAL at 08:35

## 2018-12-04 RX ADMIN — IBUPROFEN 600 MG: 600 TABLET ORAL at 08:35

## 2018-12-04 RX ADMIN — ACETAMINOPHEN 975 MG: 325 TABLET, FILM COATED ORAL at 05:08

## 2018-12-04 NOTE — PLAN OF CARE
"Problem: Patient Care Overview  Goal: Plan of Care/Patient Progress Review  Outcome: Therapy, progress toward functional goals as expected  VSS, assessments are WNL. Aaron is feeling \"much better\" today. She is up ad catie, showered and removed dressing, voiding/passing gas, eating and drinking normally. She reports nausea is completely resolved. Her incision appears to be healing well, no s/s infection, is FADI. She is pumping and getting a glistening of a drop. She reports negligible pain. She is hoping to discharge home this evening.       "

## 2018-12-04 NOTE — PROVIDER NOTIFICATION
"   12/04/18 1131   Provider Notification   Provider Name/Title Dr. Saenz, G2   Method of Notification Electronic Page   Request Evaluate-Remote   Notification Reason Other     Pt asked if she could discharge today. Says she feels \"much better\" and thinking going home will be best for her. No concerns from RN standpoint.   "

## 2018-12-04 NOTE — PROVIDER NOTIFICATION
Notified Dr. Hardy G2 of tachycardia and elevated temp of 99.6. Pt has been NPO since 1500, asked if they would like fluids infusing.

## 2018-12-04 NOTE — PROGRESS NOTES
Post Partum Progress Note    POD#1 from stat PCCS.     Subjective: reports overall doing well. Yesterday she had a bad day due to severe nausea and vomiting, but this resolved in the afternoon with zofran and compazine. She has tolerated clears since that time.  Pain moderately well controlled on ibuprofen and tylenol. Hadn't taken oxycodone the past day. Voiding with no issues. Ambulating to bathroom. Bleeding is like a light period. Passing gas. Pumping but does not have milk yet. Hopes to go see her baby today in NICU    Denies any fever, chills, SOB, chest pain, N/V,  headache, dizziness.       Objective:  Patient Vitals for the past 24 hrs:   BP Temp Temp src Pulse Heart Rate Resp SpO2   18 0500 - - - 114 - 16 99 %   18 2352 110/67 99.2  F (37.3  C) Oral 112 - 16 100 %   18 2227 - 98.6  F (37  C) Oral - - - -   18 2122 119/61 99.8  F (37.7  C) Oral 122 - 16 -   18 1910 128/79 99.6  F (37.6  C) Oral 134 - 16 -   18 1600 (!) 133/91 98.7  F (37.1  C) Oral 136 - 16 -   18 0800 110/72 98.3  F (36.8  C) Oral - 101 16 100 %       General:  NAD, appears generally well  CV:  RRR, no murmurs, rubs  Resp:  No increased work of breathing.   Abd:  Soft, nontender, nondistended, fundus firm at approximately 2 cm below umbilicus  Inc: c/d/i with bandage (will remove today in shower).   Ext: minimal edema in bilateral LE    Hgb 11.9 > EBL 1038cc > 10.4    -121    Assessment and Plan:  38 year old  on POD #2 s/p PCCS at 26w4d for PPROM and foot presentation.  Recovering appropriately, baby doing well in NICU.     Post op: pain:Well-controlled with sched tylenol and Ibuprofen. Hasn't been using. PRN oxy.  - encouraged ambulation    Type II DM: Controlled on Metformin 2000mg daily during pregnancy   - blood glucose checks QACHS, MSSI, endocrine to consult today (did not see pt yesterday 2/2 nausea)  Hgb: Hgb as above. VSS, UOP adequate, asymptomatic.   MR BP: 1x, will get  labs if she has more.   GI:  BID Senna/Colace.  PRN Simethicone.   PPx:  Encouraged ambulation   Rh: Positive  Rubella: Immune  Feed: pumping  BC: IVF pregnancy, declines  Dispo: Plan for home on POD3.      Valerie Saenz, PGY3    OB/GYN Staff -- Pt seen and examined by me. Agree with note as above.  MD Toni

## 2018-12-04 NOTE — PLAN OF CARE
Problem: Patient Care Overview  Goal: Plan of Care/Patient Progress Review  Outcome: Adequate for Discharge Date Met: 12/04/18  Data: Vital signs stable, assessments within normal limits.   Eating and drinking without nausea.  Incision healing well, no signs of infection noted.   Pt is voiding WNL, passing gas.  Discharge outcomes on care plan met.   Reports negligible pain.   Action: Review of care plan, teaching, and discharge instructions done.  Response: Aaron states understanding and comfort with her discharge instructions. All questions addressed. She will discharge home this evening with her spouse.

## 2018-12-04 NOTE — PROVIDER NOTIFICATION
12/04/18 1252   Provider Notification   Provider Name/Title Briseyda NOLASCO Endocrine PA   Method of Notification Electronic Page   Request Evaluate in Person   Notification Reason Other     She is feeling much better today and hoping to d/c this gina if you wanted to see her, since she was too sick yesterday for the consult.

## 2018-12-04 NOTE — PLAN OF CARE
Problem: Patient Care Overview  Goal: Plan of Care/Patient Progress Review  Outcome: Therapy, progress toward functional goals as expected  T99.2, other VSS. Denies nausea. Bowel sounds present. States feels exhausted.Up to bathroom to void. Declined pumping breasts at this time, plans to pump in a.m after sleep.Incision clean and dry.Settled back to sleep.

## 2018-12-04 NOTE — CONSULTS
"[facility]  MATERNAL CHILD HEALTH SOCIAL WORK NICU PROGRESS NOTE    DATA:     SW continues to meet with pt family to provide ongoing support. SW met at bedside with couple in pt's room on [facility]. Couple are \"feeling like the events of the past days are finally catching up with us.\" Pt shared that she is feeling better today and is looking forward to having more time with baby in the NICU today. Couple report feeling well supported by the [facility] team and their support network at this time.    INTERVENTION:       Chart review.     SW continues to assess for needs, offer support, and assess for coping.     SW provided supportive counseling and appropriate resources related to the impact of this hospitalization on pt family system.     Reviewed orientation to the NICU, including: parking passes, boarding rooms, rounding, treatment teams, primary nursing, and our welcoming policy of visitation.     SW shared information about hospital amenities, including: the family resource center, the wellness center.     Provided copy of Preemie Baby Book.     Provided care kit from [employer].    With mother's verbal consent, completed referral to [employer] (e-mail: [email or web address]).    Provided monthly parking assistance.    Provided information about pt's eligibility for low birth weight qualifications through [employer]    Provided psychoeducation on  mood and anxiety disorders, including symptoms and risk factors associated.     Assessed for any current symptoms, assessed history.     Offered pt Pregnancy & Postpartum Support [place] [facility]) community resource.     Discussed pattern of coping, coping skills.     SW provided emotional support and active listening.     Reassured family of ongoing SW supportive services available to them at the hospital.     Encouraged parents to access this SW for support as needed.    ASSESSMENT:     Family easily engaged in " conversation. Pt observed to have euthymic affect. They are appropriately concerned about baby's condition, but happy with pt and baby's care here at the  so far. They appear resilient and able to navigate uncertain situations. They continue to seem receptive to and appreciative of SW support. Pt continues to recover from her delivery and was able to get some sleep last night. She is able to verbally express herself and identify needs. Family appeared open to and appreciative of ongoing therapeutic support, advocacy, and connection with resources.     PLAN:       Pt plans to discharge to home from [facility] and will commute between home and [facility]; family are aware of night by night availability of NICU boarding rooms.    SW will continue to assess needs and provide ongoing psychosocial support and access to appropriate resources/referrals.     SW will continue to collaborate with the multidisciplinary team.    ADAMS Calix, [facility]  Clinical   Maternal Child Health  [facility]  Phone:   [phone]  Pager:    [phone]

## 2018-12-04 NOTE — PLAN OF CARE
Problem: Patient Care Overview  Goal: Plan of Care/Patient Progress Review  Outcome: Improving  Postpartum stable. C/O nausea at the start of the shift, was marked as NPO and had received compazine on day shift. Slept through a lot of the shift, given zofran x1 for maintenance for persistent nausea. Notified MD of tachycardia and elevated temp of 99.6, given a fluid bolus and has IV fluids infusing at 125 ml/hr. Encouraged to ambulate and use insentive spirometer. Temp su at 2230 was 98.6 and was given IV compazine, tolerating sips of water. BG's were 127 and 112, no insulin needed. Voiding adequate amounts, ambulating to bathroom with assistance due to IV pole. Pt states that she feels much better. Continue to given nausea meds as needed and will give pain meds when she feels up to it.

## 2018-12-17 ENCOUNTER — TELEPHONE (OUTPATIENT)
Dept: FAMILY MEDICINE | Facility: CLINIC | Age: 38
End: 2018-12-17

## 2018-12-17 NOTE — TELEPHONE ENCOUNTER
Reason for Call:  [facility]     Detailed comments: RN from [facility] called she wants to give some update for Sylvain that she delivered with 26 weeks and she has diabetes. In case Dr Barrett wants to contact nurse she can call at [phone]    Phone Number Patient can be reached at: Other phone number:  [phone]*    Best Time: anytime    Can we leave a detailed message on this number? YES    Call taken on 12/17/2018 at 11:02 AM by Gaby Cardozo

## 2018-12-17 NOTE — TELEPHONE ENCOUNTER
Yes I was aware of the delivery and have been helping manage her diabetes, along with endocrinology.     I hope her baby is doing ok.     Please obtain more information from the RN if possible. Thank you.

## 2018-12-17 NOTE — TELEPHONE ENCOUNTER
Dr Barrett,    See note below. Would you like me to call to get further details?    Ana Manley RN- Triage [employer]

## 2018-12-18 NOTE — TELEPHONE ENCOUNTER
Julia is a very compliant patient and had been very well controlled with her Diabetes prior to IVF. I'm sure she is very busy right now with going to the NICU but if she has any concerns she can certainly send me a message via [employer].

## 2018-12-18 NOTE — TELEPHONE ENCOUNTER
Called nurse back:  Patient delivered at 26 weeks via - nurse did follow up on the c section only- patient is doing fine- initial brain scan showed no brain bleeds for infant-will repeat at 36 weeks- so far mom and baby are doing fine as far as home care nurse know-   she has not seen baby- just what mom is telling her  Nurse does not know how patient is doing with diabetes- she just did follow up on C section only as is part of the  protocol  Patient is following with OB gyn for her care.  Ny Colunga,RN BSN  [facility]  [phone]

## 2018-12-24 ENCOUNTER — OFFICE VISIT (OUTPATIENT)
Dept: FAMILY MEDICINE | Facility: CLINIC | Age: 38
End: 2018-12-24
Payer: COMMERCIAL

## 2018-12-24 VITALS
DIASTOLIC BLOOD PRESSURE: 75 MMHG | OXYGEN SATURATION: 99 % | HEART RATE: 97 BPM | HEIGHT: 62 IN | SYSTOLIC BLOOD PRESSURE: 107 MMHG | TEMPERATURE: 98.3 F | WEIGHT: 140 LBS | RESPIRATION RATE: 14 BRPM | BODY MASS INDEX: 25.76 KG/M2

## 2018-12-24 DIAGNOSIS — R51.9 SINUS HEADACHE: Primary | ICD-10-CM

## 2018-12-24 PROCEDURE — 99213 OFFICE O/P EST LOW 20 MIN: CPT | Performed by: PHYSICIAN ASSISTANT

## 2018-12-24 ASSESSMENT — MIFFLIN-ST. JEOR: SCORE: 1272.26

## 2018-12-24 NOTE — PROGRESS NOTES
SUBJECTIVE:   Sylvain Major is a 38 year old female who presents to clinic today for the following health issues:    Facial Pan       Duration: 2 days    Description  Facial pain on right side.    Julia presents to the clinic today with 1 day hx of right sided frontal sinus pain that has been constant despite tylenol.  She had these symptoms in October while she was pregnant and antibiotics were prescribed which did help her symptoms.  Of note, her son is currently receiving care at the NICU due to prematurity ( currently 29 weeks).  She is trying to be very careful of exposing him to illness.  No current or recent URI symptoms, fevers or rhinorrhea.          Problem list and histories reviewed & adjusted, as indicated.  Additional history: as documented    Patient Active Problem List   Diagnosis     CARDIOVASCULAR SCREENING; LDL GOAL LESS THAN 160      premature rupture of membranes (PPROM) delivered, current hospitalization     Insulin controlled gestational diabetes mellitus (GDM) in second trimester     Type 2 diabetes mellitus affecting pregnancy, antepartum     Dichorionic diamniotic twin gestation      premature rupture of membranes (PPROM) with unknown onset of labor     Oligohydramnios antepartum, second trimester, fetus 1     PROM (premature rupture of membranes)     Type 2 diabetes mellitus without complication, without long-term current use of insulin (H)     Hyperlipidemia LDL goal <70     Atypical chest pain     PCOS (polycystic ovarian syndrome)     Cervical insufficiency during pregnancy in second trimester, antepartum     Cervical insufficiency during pregnancy in first trimester, antepartum     Encounter for triage in pregnant patient     S/P  section     Past Surgical History:   Procedure Laterality Date      SECTION N/A 2018    Procedure:  SECTION - code c/s;  Surgeon: Clementine Siddiqi MD;  Location:  [facility]       Social History      Tobacco Use     Smoking status: Never Smoker     Smokeless tobacco: Never Used   Substance Use Topics     Alcohol use: Yes     Alcohol/week: 0.0 oz     Comment: occ     Family History   Problem Relation Age of Onset     Heart Disease Mother      Diabetes Father      Diabetes Paternal Grandmother      Diabetes Paternal Grandfather          Current Outpatient Medications   Medication Sig Dispense Refill     acetaminophen (TYLENOL) 325 MG tablet Take 1-2 tablets (325-650 mg) by mouth every 6 hours as needed for mild pain 60 tablet 0     amoxicillin-clavulanate (AUGMENTIN) 875-125 MG tablet Take 1 tablet by mouth 2 times daily for 7 days 14 tablet 0     Cholecalciferol (VITAMIN D) 2000 UNITS tablet Take 2,000 Units by mouth       ibuprofen (ADVIL/MOTRIN) 600 MG tablet Take 1 tablet (600 mg) by mouth every 6 hours as needed for moderate pain 30 tablet 1     Omega-3 Fatty Acids (FISH OIL PO)        Prenatal Vit-Fe Fumarate-FA (PRENATAL MULTIVITAMIN PLUS IRON) 27-0.8 MG TABS per tablet Take 1 tablet by mouth daily       triamcinolone (KENALOG) 0.1 % lotion Apply topically 3 times daily 60 mL 0     ACE/ARB NOT PRESCRIBED, INTENTIONAL, ACE & ARB not prescribed due to Current Pregnancy (Patient not taking: Reported on 12/24/2018)       blood glucose calibration (ONETOUCH VERIO) solution Use to calibrate blood glucose monitor as directed. (Patient not taking: Reported on 12/24/2018) 1 each 0     cetirizine (ZYRTEC) 10 MG tablet Take 1 tablet (10 mg) by mouth every evening (Patient not taking: Reported on 12/24/2018) 30 tablet 1     Docusate Sodium (COLACE PO) Take by mouth 2 times daily       Glucose Blood (BLOOD GLUCOSE TEST STRIPS) STRP 1 strip by Lancet route daily (Patient not taking: Reported on 12/24/2018) 100 each 3     insulin pen needle (BD NANNETTE U/F) 32G X 4 MM Use 1 pen needles daily or as directed. (Patient not taking: Reported on 12/24/2018) 100 each 3     No Known Allergies    Reviewed and updated as needed  "this visit by clinical staff       Reviewed and updated as needed this visit by Provider         ROS:  Constitutional, HEENT, cardiovascular, pulmonary, gi and gu systems are negative, except as otherwise noted.    OBJECTIVE:     /75 (Cuff Size: Adult Regular)   Pulse 97   Temp 98.3  F (36.8  C) (Tympanic)   Resp 14   Ht 1.581 m (5' 2.25\")   Wt 63.5 kg (140 lb)   LMP 05/29/2018   SpO2 99%   BMI 25.40 kg/m    Body mass index is 25.4 kg/m .  GENERAL: healthy, alert and no distress  EYES: Eyes grossly normal to inspection, PERRL and conjunctivae and sclerae normal  HENT: ear canals and TM's normal, nose and mouth without ulcers or lesions, Right frontal sinus TTP  NECK: no adenopathy  RESP: lungs clear to auscultation - no rales, rhonchi or wheezes  CV: regular rate and rhythm, normal S1 S2  Diagnostic Test Results:  none     ASSESSMENT/PLAN:       1. Sinus headache  At this time I have advised that she continue tylenol due to very recent onset of likely sinus headache.  Given her current situation with immunocompromised son, I am going to prescribe her an antibiotic to use if symptoms worsen or fail to improve over the next 2-3 days.  She is agreeable.   - amoxicillin-clavulanate (AUGMENTIN) 875-125 MG tablet; Take 1 tablet by mouth 2 times daily for 7 days  Dispense: 14 tablet; Refill: 0    Follow-Up: if persistent or worsening symptoms    Curt Becker PA-C  [facility]    "

## 2018-12-30 ENCOUNTER — TRANSFERRED RECORDS (OUTPATIENT)
Dept: HEALTH INFORMATION MANAGEMENT | Facility: CLINIC | Age: 38
End: 2018-12-30

## 2018-12-30 LAB — RETINOPATHY: NEGATIVE

## 2019-01-07 ENCOUNTER — TRANSFERRED RECORDS (OUTPATIENT)
Dept: HEALTH INFORMATION MANAGEMENT | Facility: CLINIC | Age: 39
End: 2019-01-07

## 2019-01-16 ENCOUNTER — TELEPHONE (OUTPATIENT)
Dept: OPHTHALMOLOGY | Facility: CLINIC | Age: 39
End: 2019-01-16

## 2019-01-17 ENCOUNTER — MEDICAL CORRESPONDENCE (OUTPATIENT)
Dept: HEALTH INFORMATION MANAGEMENT | Facility: CLINIC | Age: 39
End: 2019-01-17

## 2019-01-17 DIAGNOSIS — H35.52 RP (RETINITIS PIGMENTOSA): Primary | ICD-10-CM

## 2019-01-21 ENCOUNTER — ALLIED HEALTH/NURSE VISIT (OUTPATIENT)
Dept: OPHTHALMOLOGY | Facility: CLINIC | Age: 39
End: 2019-01-21
Attending: OPHTHALMOLOGY
Payer: COMMERCIAL

## 2019-01-21 DIAGNOSIS — H35.52 RP (RETINITIS PIGMENTOSA): ICD-10-CM

## 2019-01-21 PROCEDURE — 40000269 ZZH STATISTIC NO CHARGE FACILITY FEE: Mod: ZF

## 2019-01-21 PROCEDURE — 92274 MULTIFOCAL ERG W/I&R: CPT | Mod: ZF

## 2019-01-21 ASSESSMENT — VISUAL ACUITY
CORRECTION_TYPE: CONTACTS
OD_CC+: -2
OD_CC: 20/20
METHOD: SNELLEN - LINEAR
OS_CC+: +
OS_CC: 20/25

## 2019-01-21 ASSESSMENT — REFRACTION_WEARINGRX
SPECS_TYPE: SVL
OS_SPHERE: -4.00
OD_SPHERE: -3.25
OS_SPHERE: -3.75
OS_AXIS: 090
OD_SPHERE: -3.25
OS_CYLINDER: +0.25

## 2019-01-21 NOTE — NURSING NOTE
Referred to Dr. 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  Has had annual eye exams; pigment changes noted since 2017 and watched for a year before referral.  Pt is without visual symptoms.  TRISTAN w/Dr. Steward 01-17-19:  'RP both eyes.  Probable diagnosis given arterial attenuation and bone spicule pigment changes.  Discussed genetic testing and blood sample acquired same day.'  Pt states no interval changes.  Denies night vision problems or vf probs, no difficulty driving at night.  -FHx.  H/O IVF tx since 2015-twins lost late in pregnancy, recent preemie baby boy 2018 (now 33 weeks and 5 days old) presently in [facility] NICU.  Anxious for ERG and genetics testing results to determine dx and next steps for pt and new baby boy.  No f/u yet scheduled w/Dr. Steward; will await results.

## 2019-01-21 NOTE — PROGRESS NOTES
2019    STANDARD ERG REPORT    Referring:  Dr. Solomon Steward/Tarik  Interpretation: Leandro-cone dystrophy    RE:  Sylvain Major  MRN:  [id number]  :  1980    ERG Date:  2019    CLINICAL HISTORY:  Sylvain Major is a 38 year old year-old patient with diagnosis of RP (retinitis pigmentosa), referred by Dr. Solomon Steward for a full field electroretinogram (ERG). The Patient was initially referred to Dr. 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  Has had annual eye exams; pigment changes noted since 2017 and watched for a year before referral. patient is without visual symptoms.  TRISTAN rock/Dr. Steward 19:  arterial attenuation and bone spicule pigment changes, consistent with Retinitis pigmentosa.  Denies night vision problems or visual field problems, no difficulty driving at night.  -FHx.     IMPRESSION:  1. Leandro-cone dystrophy                  Visual Acuity Right Eye : 20/20-2      w/sctl, -3.25sph    Visual Acuity Left Eye : 20/25+      w/sctl, -3.75sph                   ALL AVERAGED  Data for Full-Field ERG Right Eye   Left Eye    Dark-Adapted Patient Normal Patient   0.01 ERG (leandro) amplitude( v) 43* 124.5-357.7 44*   0.01 ERG (leandro) implicit time(ms) 89.9* 68.3-98.9 84*   MMMMM      3.0 ERG (combined) a-wave amplitude( v) -26 -244.6 to -62.3 -26   3.0 ERG (combined) a-wave implicit time(ms) 15 12.4-23.1 15.8   3.0 ERG (combined) b-wave amplitude( v) 46 136.0-413.3 49   3.0 ERG (combined) b-wave implicit time(ms) 45.8 28.3-48.5 44.1   MMMMM      3.0 Oscillatory Potentials        Light-Adapted      3.0 Flicker (30-Hz) amplitude( v) 16(17) 74.3-139.4 14(15)   3.0 Flicker (30-Hz) implicit time(ms) 29.1(39.1) 22.4-26.3 29.1(36.6)         3.0 ERG (cone) a-wave amplitude( v) -6 -58.5 to -23.6 -7   3.0 ERG (cone) a-wave implicit time(ms) 12.5 14.5-17.7 11.7   3.0 ERG (cone) b-wave amplitude( v) 22* 57.6-194.1 21   3.0 ERG (cone) b-wave implicit  time(ms) 31.6* 26.2-28.9 32.5            *=manipulated cursors           parentheses=cursor at bi-lobe     INTERPRETATION:        This full field electroretinogram was performed according to ISCEV standards using Cuturia E3 system and DTL fiber recording electrodes. The patient tolerated the testing well.  The waveforms are fairly reproducible and well formed. The normative values provided above represent the 95% confidence limits for a normal individual the age of the patient. The patient s responses are averaged. There is mild asymmetry of the responses in between both eyes.    In dark adapted conditions, the mustapha-specific responses have decreased amplitudes and the implicit times are normal in both eyes.   The maximal response, a combined mustapha and cone responses, have mod to severe reduced amplitudes in both eyes and the implicit time is normal.  With a higher intensity flash, the responses improve, but persistently reduced in both eyes.  The bright flash (scotopic 10.0) response is not electronegative.    The oscillatory potentials are reduced bilaterally.    In light adapted conditions, the 30-Hz flicker responses have a severe residual amplitude and the implicit time is delayed in both eyes.    The single photopic responses are abnormal in both eyes.    Conclusion:  This represents an abnormal electroretinogram consistent with the diagnosis of RP (retinitis pigmentosa).  This electroretinogram shows moderate to severe loss of mustapha/cone function. The etiology for this is unknown and could be consistent with early retinal dystrophy. The differential diagnosis includes: post-inflammatory retinopathy, toxic retinopathy and Autoimmune Retinopathy. Consideration could be given to drawing blood for the retinal dystrophy panel, with hopes of determining the mutations accounting for this retinal dystrophy.     Clinical correlation is recommended.    A  repeat electroretinogram could be considered in 1-2 years or earlier if  the clinical situation changes.     Dear Nathan, thank you for the opportunity to provide electrophysiologic services for this patient.  Please do not hesitate to call if there should be any questions regarding these results.    Emelyn Miranda MD     Retina Service   Department of Ophthalmology & Visual Neurosciences   [facility]  Phone: [phone]   Fax: [phone]

## 2019-01-21 NOTE — LETTER
2019    STANDARD ERG REPORT    Referring:  Dr. Solmoon Steward/Tarik    Interpretation:  Leandro-cone dystrophy    RE:  Sylvain Major  MRN:  [id number]  :  1980    ERG Date:  2019    CLINICAL HISTORY:  Sylvain Major is a 38-year-old patient with diagnosis of RP (retinitis pigmentosa), referred by Dr. Solomon Steward for a full-field electroretinogram (ERG). The patient was initially referred to Dr. 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  Has had annual eye exams; pigment changes noted since 2017 and watched for a year before referral. Patient is without visual symptoms. Last eye exam w/Dr. Steward 19:  Arterial attenuation and bone spicule pigment changes, consistent with retinitis pigmentosa.  Denies night vision problems or visual field problems, no difficulty driving at night.  -FHx.      IMPRESSION:  Leandro-cone dystrophy                  Visual Acuity: Right Eye: 20/20-2      w/sctl, -3.25sph      Left Eye: 20/25+      w/sctl, -3.75sph                                         ALL AVERAGED  Data for Full-Field ERG Right Eye   Left Eye    Dark-Adapted Patient Normal Patient   0.01 ERG (leandro) amplitude( v) 43* 124.5-357.7 44*   0.01 ERG (leandro) implicit time(ms) 89.9* 68.3-98.9 84*   MMMMM      3.0 ERG (combined) a-wave amplitude( v) -26 -244.6 to -62.3 -26   3.0 ERG (combined) a-wave implicit time(ms) 15 12.4-23.1 15.8   3.0 ERG (combined) b-wave amplitude( v) 46 136.0-413.3 49   3.0 ERG (combined) b-wave implicit time(ms) 45.8 28.3-48.5 44.1   MMMMM      3.0 Oscillatory Potentials  Reduced Present Reduced    Light-Adapted      3.0 Flicker (30-Hz) amplitude( v) 16(17) 74.3-139.4 14(15)   3.0 Flicker (30-Hz) implicit time(ms) 29.1(39.1) 22.4-26.3 29.1(36.6)         3.0 ERG (cone) a-wave amplitude( v) -6 -58.5 to -23.6 -7   3.0 ERG (cone) a-wave implicit time(ms) 12.5 14.5-17.7 11.7   3.0 ERG (cone) b-wave amplitude( v) 22*  57.6-194.1 21   3.0 ERG (cone) b-wave implicit time(ms) 31.6* 26.2-28.9 32.5                      *=manipulated cursors                     parentheses=cursor at bi-lobe     INTERPRETATION:  This full-field electroretinogram was performed according to ISCEV standards using the ESPION E3 system and DTL fiber-recording electrodes. The patient tolerated the testing well. The waveforms are fairly reproducible and well formed. The normative values provided above represent the 95% confidence limits for a normal individual the age of the patient. The patient s responses are averaged. There is mild asymmetry of the responses in between both eyes.    In dark-adapted conditions, the mustapha-specific responses have decreased amplitudes and the implicit times are normal in both eyes. The maximal response, a combined mustapha and cone response, has moderately-to-severely reduced amplitudes in both eyes and the implicit time is normal. With a higher intensity flash, the responses improve, but are persistently reduced in both eyes. The bright flash (scotopic 10.0) response is not electronegative. The oscillatory potentials are reduced bilaterally.    In light-adapted conditions, the 30-Hz flicker responses have a severely residual amplitude and the implicit time is delayed in both eyes.  The single photopic responses are abnormal in both eyes.    CONCLUSION: This represents an abnormal electroretinogram consistent with the diagnosis of RP (retinitis pigmentosa).  This electroretinogram shows moderate-to-severe loss of mustapha/cone function. The etiology for this is unknown and could be consistent with early retinal dystrophy. The differential diagnoses include: post-inflammatory retinopathy, toxic retinopathy, and autoimmune retinopathy. Consideration could be given to drawing blood for the retinal dystrophy panel with hopes of determining the mutations accounting for this retinal dystrophy.     Clinical correlation is recommended.    A repeat  electroretinogram could be considered in 1-2 years, or earlier if the clinical situation changes.     Nathan, thank you for the opportunity to provide electrophysiologic services for this patient.  Please do not hesitate to call if there should be any questions regarding these results.    Sincerely,    Emelyn Miranda MD     Retina Service   Department of Ophthalmology & Visual Neurosciences   [facility]  Phone: [phone]   Fax: [phone]

## 2019-08-13 ENCOUNTER — TRANSFERRED RECORDS (OUTPATIENT)
Dept: HEALTH INFORMATION MANAGEMENT | Facility: CLINIC | Age: 39
End: 2019-08-13

## 2019-09-10 ENCOUNTER — OFFICE VISIT (OUTPATIENT)
Dept: FAMILY MEDICINE | Facility: CLINIC | Age: 39
End: 2019-09-10
Payer: COMMERCIAL

## 2019-09-10 VITALS
DIASTOLIC BLOOD PRESSURE: 80 MMHG | WEIGHT: 146 LBS | OXYGEN SATURATION: 99 % | HEIGHT: 62 IN | BODY MASS INDEX: 26.87 KG/M2 | HEART RATE: 117 BPM | TEMPERATURE: 98.5 F | SYSTOLIC BLOOD PRESSURE: 110 MMHG

## 2019-09-10 DIAGNOSIS — E11.9 TYPE 2 DIABETES MELLITUS WITHOUT COMPLICATION, WITHOUT LONG-TERM CURRENT USE OF INSULIN (H): ICD-10-CM

## 2019-09-10 DIAGNOSIS — M79.622 PAIN OF LEFT UPPER ARM: Primary | ICD-10-CM

## 2019-09-10 LAB — HBA1C MFR BLD: 6.8 % (ref 0–5.6)

## 2019-09-10 PROCEDURE — 82043 UR ALBUMIN QUANTITATIVE: CPT | Performed by: INTERNAL MEDICINE

## 2019-09-10 PROCEDURE — 80048 BASIC METABOLIC PNL TOTAL CA: CPT | Performed by: INTERNAL MEDICINE

## 2019-09-10 PROCEDURE — 99214 OFFICE O/P EST MOD 30 MIN: CPT | Performed by: INTERNAL MEDICINE

## 2019-09-10 PROCEDURE — 83036 HEMOGLOBIN GLYCOSYLATED A1C: CPT | Performed by: INTERNAL MEDICINE

## 2019-09-10 PROCEDURE — 80061 LIPID PANEL: CPT | Performed by: INTERNAL MEDICINE

## 2019-09-10 PROCEDURE — 36415 COLL VENOUS BLD VENIPUNCTURE: CPT | Performed by: INTERNAL MEDICINE

## 2019-09-10 ASSESSMENT — MIFFLIN-ST. JEOR: SCORE: 1294.47

## 2019-09-10 NOTE — PROGRESS NOTES
Subjective     Sylvain Major is a 39 year old female who presents to clinic today for the following health issues:    HPI   Concern - left arm pain   Onset: 2 months     In  of this summer Julia was trimming the trees in her yard and noticed afterwards that her left arm was hurting. She is having pain just above the elbow. Pain with opening a jar or when she carries her son in her left arm. Or when she is carrying his car seat. Some weakness.     Julia went through IVF and now has a 9 month old (but 6 months corrected due to prematurity). He is having some weight gain issues and has been referred to a pediatric gastroenterologist for possible g-tube placement.    Diabetes Follow-up      How often are you checking your blood sugar? A few times a month    What time of day are you checking your blood sugars (select all that apply)?  Before meals    Have you had any blood sugars above 200?  No    Have you had any blood sugars below 70?  No    What symptoms do you notice when your blood sugar is low?  None    What concerns do you have today about your diabetes? None     Do you have any of these symptoms? (Select all that apply)  No numbness or tingling in feet.  No redness, sores or blisters on feet.  No complaints of excessive thirst.  No reports of blurry vision.  No significant changes to weight.     Have you had a diabetic eye exam in the last 12 months? No    BP Readings from Last 2 Encounters:   09/10/19 110/80   18 107/75     Hemoglobin A1C (%)   Date Value   2018 4.9   2018 6.1 (H)     LDL Cholesterol Calculated (mg/dL)   Date Value   12/15/2017 38   2017 174 (H)       Diabetes Management Resources    Patient Active Problem List   Diagnosis     CARDIOVASCULAR SCREENING; LDL GOAL LESS THAN 160      premature rupture of membranes (PPROM) delivered, current hospitalization     Insulin controlled gestational diabetes mellitus (GDM) in second trimester     Type 2 diabetes  "mellitus affecting pregnancy, antepartum     Dichorionic diamniotic twin gestation      premature rupture of membranes (PPROM) with unknown onset of labor     Oligohydramnios antepartum, second trimester, fetus 1     PROM (premature rupture of membranes)     Type 2 diabetes mellitus without complication, without long-term current use of insulin (H)     Hyperlipidemia LDL goal <70     Atypical chest pain     PCOS (polycystic ovarian syndrome)     Cervical insufficiency during pregnancy in second trimester, antepartum     Cervical insufficiency during pregnancy in first trimester, antepartum     Encounter for triage in pregnant patient     S/P  section     Past Surgical History:   Procedure Laterality Date      SECTION N/A 2018    Procedure:  SECTION - code c/s;  Surgeon: Clementine Siddiqi MD;  Location:  L+D       Social History     Tobacco Use     Smoking status: Never Smoker     Smokeless tobacco: Never Used   Substance Use Topics     Alcohol use: Yes     Alcohol/week: 0.0 oz     Comment: occ     Family History   Problem Relation Age of Onset     Heart Disease Mother      Diabetes Father      Diabetes Paternal Grandmother      Diabetes Paternal Grandfather            Reviewed and updated as needed this visit by Provider         Review of Systems   ROS COMP: Constitutional, HEENT, cardiovascular, pulmonary, gi and gu systems are negative, except as otherwise noted.      Objective    /80   Pulse 117   Temp 98.5  F (36.9  C) (Tympanic)   Ht 1.581 m (5' 2.25\")   Wt 66.2 kg (146 lb)   SpO2 99%   BMI 26.49 kg/m    Body mass index is 26.49 kg/m .  Physical Exam   GENERAL: healthy, alert and no distress  NECK: no adenopathy, no asymmetry, masses, or scars and thyroid normal to palpation  RESP: lungs clear to auscultation - no rales, rhonchi or wheezes  CV: regular rate and rhythm, normal S1 S2, no S3 or S4, no murmur, click or rub, no peripheral edema and " "peripheral pulses strong  MS: Left upper extremity normal in appearance. Tenderness just above the lateral epicondyle. Worse with forced flexion at the wrist and elbow.    Diagnostic Test Results:  Labs reviewed in Epic    HbA1c 6.8%    Assessment & Plan     1. Pain of left upper arm  Seems to be lateral epicondylitis although she is localizing her pain just above her elbow and I would usually expect this to be below the elbow. Since it has been present since June I am recommending referral to ortho for possible injection.   - ORTHO  REFERRAL    2. Type 2 diabetes mellitus without complication, without long-term current use of insulin (H)  A1c 6.8% which is at goal. Continue Metformin 2000 mg daily. Follow up in 6 months.  - Hemoglobin A1c  - Albumin Random Urine Quantitative with Creat Ratio  - Lipid panel reflex to direct LDL Fasting  - Basic metabolic panel     BMI:   Estimated body mass index is 26.49 kg/m  as calculated from the following:    Height as of this encounter: 1.581 m (5' 2.25\").    Weight as of this encounter: 66.2 kg (146 lb).         Return in about 6 months (around 3/10/2020).    Janine Barrett MD  [facility] [place]      "

## 2019-09-11 LAB
ANION GAP SERPL CALCULATED.3IONS-SCNC: 10 MMOL/L (ref 3–14)
BUN SERPL-MCNC: 9 MG/DL (ref 7–30)
CALCIUM SERPL-MCNC: 9.5 MG/DL (ref 8.5–10.1)
CHLORIDE SERPL-SCNC: 103 MMOL/L (ref 94–109)
CHOLEST SERPL-MCNC: 269 MG/DL
CO2 SERPL-SCNC: 22 MMOL/L (ref 20–32)
CREAT SERPL-MCNC: 0.38 MG/DL (ref 0.52–1.04)
CREAT UR-MCNC: 22 MG/DL
GFR SERPL CREATININE-BSD FRML MDRD: >90 ML/MIN/{1.73_M2}
GLUCOSE SERPL-MCNC: 201 MG/DL (ref 70–99)
HDLC SERPL-MCNC: 45 MG/DL
LDLC SERPL CALC-MCNC: 170 MG/DL
MICROALBUMIN UR-MCNC: <5 MG/L
MICROALBUMIN/CREAT UR: NORMAL MG/G CR (ref 0–25)
NONHDLC SERPL-MCNC: 224 MG/DL
POTASSIUM SERPL-SCNC: 4.2 MMOL/L (ref 3.4–5.3)
SODIUM SERPL-SCNC: 135 MMOL/L (ref 133–144)
TRIGL SERPL-MCNC: 270 MG/DL

## 2019-09-12 DIAGNOSIS — E78.5 HYPERLIPIDEMIA LDL GOAL <70: Primary | ICD-10-CM

## 2019-09-12 RX ORDER — ATORVASTATIN CALCIUM 20 MG/1
20 TABLET, FILM COATED ORAL DAILY
Qty: 90 TABLET | Refills: 1 | Status: SHIPPED | OUTPATIENT
Start: 2019-09-12 | End: 2020-03-05

## 2019-09-13 ENCOUNTER — OFFICE VISIT (OUTPATIENT)
Dept: ORTHOPEDICS | Facility: CLINIC | Age: 39
End: 2019-09-13
Payer: COMMERCIAL

## 2019-09-13 VITALS
WEIGHT: 146 LBS | SYSTOLIC BLOOD PRESSURE: 110 MMHG | BODY MASS INDEX: 26.87 KG/M2 | DIASTOLIC BLOOD PRESSURE: 70 MMHG | HEIGHT: 62 IN

## 2019-09-13 DIAGNOSIS — M77.12 LATERAL EPICONDYLITIS OF LEFT ELBOW: Primary | ICD-10-CM

## 2019-09-13 PROCEDURE — 20551 NJX 1 TENDON ORIGIN/INSJ: CPT | Mod: LT | Performed by: FAMILY MEDICINE

## 2019-09-13 PROCEDURE — 99203 OFFICE O/P NEW LOW 30 MIN: CPT | Mod: 25 | Performed by: FAMILY MEDICINE

## 2019-09-13 RX ORDER — LIDOCAINE HYDROCHLORIDE 10 MG/ML
1 INJECTION, SOLUTION INFILTRATION; PERINEURAL
Status: DISCONTINUED | OUTPATIENT
Start: 2019-09-13 | End: 2020-05-11 | Stop reason: ALTCHOICE

## 2019-09-13 RX ORDER — TRIAMCINOLONE ACETONIDE 40 MG/ML
20 INJECTION, SUSPENSION INTRA-ARTICULAR; INTRAMUSCULAR
Status: DISCONTINUED | OUTPATIENT
Start: 2019-09-13 | End: 2019-12-04

## 2019-09-13 RX ADMIN — TRIAMCINOLONE ACETONIDE 20 MG: 40 INJECTION, SUSPENSION INTRA-ARTICULAR; INTRAMUSCULAR at 09:27

## 2019-09-13 RX ADMIN — LIDOCAINE HYDROCHLORIDE 1 ML: 10 INJECTION, SOLUTION INFILTRATION; PERINEURAL at 09:27

## 2019-09-13 ASSESSMENT — MIFFLIN-ST. JEOR: SCORE: 1294.47

## 2019-09-13 NOTE — PROGRESS NOTES
Musculoskeletal Problem          [facility]   Clinic Visit s Sep 13, 2019    PCP: Janine Barrett      Sylvain is a 39 year old female who is seen as self referral for   Chief Complaint   Patient presents with     Left Elbow - Pain       Injury: Patient describes injury as using gardens scissors in .       Right hand dominant    Location of Pain: left elbow lateral, nonradiating   Duration of Pain: 3 month(s)  Rating of Pain at worst: 7/10  Rating of Pain Currently: 4/10  Pain is better with: activity avoidance   Pain is worse with: carrying (trash, groceries, laundry) and childcare  Treatment so far consists of: icyhot, heat, ice and ibuprofen  Associated symptoms: no distal numbness or tingling; denies swelling or warmth  Recent imaging completed: No recent imaging completed.  Prior History of related problems: none    Social History: is employed as a/an financial crimes investigator      Past Medical History:   Diagnosis Date     CARDIOVASCULAR SCREENING; LDL GOAL LESS THAN 160 2013       Patient Active Problem List    Diagnosis Date Noted     Encounter for triage in pregnant patient 2018     Priority: Medium     S/P  section 2018     Priority: Medium     Cervical insufficiency during pregnancy in first trimester, antepartum 2018     Priority: Medium     Cervical insufficiency during pregnancy in second trimester, antepartum 2018     Priority: Medium     PCOS (polycystic ovarian syndrome) 2018     Priority: Medium     Type 2 diabetes mellitus without complication, without long-term current use of insulin (H) 10/20/2017     Priority: Medium     Hyperlipidemia LDL goal <70 10/20/2017     Priority: Medium     Atypical chest pain 10/20/2017     Priority: Medium     PROM (premature rupture of membranes) 2016     Priority: Medium     Type 2 diabetes mellitus affecting pregnancy, antepartum 10/31/2016     Priority: Medium     Dichorionic  "diamniotic twin gestation 10/31/2016     Priority: Medium      premature rupture of membranes (PPROM) with unknown onset of labor 10/31/2016     Priority: Medium     PPROM fetus 1 10/16/16 (19+2 wks)       Oligohydramnios antepartum, second trimester, fetus 1 10/31/2016     Priority: Medium     Insulin controlled gestational diabetes mellitus (GDM) in second trimester 10/25/2016     Priority: Medium      premature rupture of membranes (PPROM) delivered, current hospitalization 10/17/2016     Priority: Medium     CARDIOVASCULAR SCREENING; LDL GOAL LESS THAN 160 2013     Priority: Medium       Family History   Problem Relation Age of Onset     Heart Disease Mother      Diabetes Father      Diabetes Paternal Grandmother      Diabetes Paternal Grandfather        Social History     Socioeconomic History     Marital status:      Spouse name: Not on file     Number of children: Not on file     Years of education: Not on file     Highest education level: Not on file   Occupational History     Not on file   Social Needs     Financial resource strain: Not on file     Food insecurity:     Worry: Not on file     Inability: Not on file     Transportation needs:     Medical: Not on file     Non-medical: Not on file   Tobacco Use     Smoking status: Never Smoker     Smokeless tobacco: Never Used   Substance and Sexual Activity     Alcohol use: Yes     Alcohol/week: 0.0 oz     Comment: occ     Drug use: No     Past Surgical History:   Procedure Laterality Date      SECTION N/A 2018    Procedure:  SECTION - code c/s;  Surgeon: Clementine Siddiqi MD;  Location:  L+D           Review of Systems   Musculoskeletal: Positive for joint pain.   All other systems reviewed and are negative.        Physical Exam  /70   Ht 1.581 m (5' 2.25\")   Wt 66.2 kg (146 lb)   BMI 26.49 kg/m    Constitutional:well-developed, well-nourished, and in no distress.   Cardiovascular: Intact " distal pulses.    Neurological: alert. Gait Normal:   Gait, station, stance, and balance appear normal for age  Skin: Skin is warm and dry.   Psychiatric: Mood and affect normal.   Respiratory: unlabored, speaks in full sentences  Lymph: no LAD, no lymphangitis      Left Elbow Exam     Tenderness   The patient is experiencing tenderness in the lateral epicondyle.     Range of Motion   Extension: normal   Flexion: normal   Pronation: normal   Supination: normal     Muscle Strength   Pronation:  5/5   Supination:  5/5     Tests   Varus: negative  Valgus: negative  Tinel's sign (cubital tunnel): negative    Other   Erythema: absent  Scars: absent  Sensation: normal  Pulse: present    Comments:  Posterior pain at full extension.  Pain with  and resisted wrist extension, full strength              ASSESSMENT/PLAN    ICD-10-CM    1. Lateral epicondylitis of left elbow M77.12      Nature of injury discussed noting degenerative nature of these injuries as opposed to inflammatory conditions.  Treatment options reviewed including continued rest, therapy either home program or formal hand therapy referral, and injection of cortisone or irritant therapy protocols.    Noted that PRP and autologous blood injections are considered investigational and not covered by health plans. NG patches can be helpful but may take several months to be effective. Cortisone injections are commonly done, and can be effective though are at risk for relapse, but pain control often valuable to many patients.     She opts to proceed with a cortisone injection today.  If recurrent, she should return for a recheck appointment and discuss further options but Is aware injections would not be given repeatedly.        Hand / Upper Extremity Injection/Arthrocentesis: L elbow  Date/Time: 9/13/2019 9:27 AM  Performed by: Kulwant Zeng MD  Authorized by: Kulwant Zeng MD     Indications:  Pain  Needle Size:  27 G  Guidance: landmark     Approach:  Lateral  Condition: epicondylitis, lateral      Site:  L elbow  Medications:  1 mL lidocaine 1 %; 20 mg triamcinolone 40 MG/ML  Outcome:  Tolerated well, no immediate complications  Procedure discussed: discussed risks, benefits, and alternatives    Consent Given by:  Patient  Timeout: timeout called immediately prior to procedure    Prep: patient was prepped and draped in usual sterile fashion

## 2019-09-13 NOTE — LETTER
2019         RE: Sylvain Major  [address] [place] [address]        Dear Colleague,    Thank you for referring your patient, Sylvain Major, to the [facility]. Please see a copy of my visit note below.    Musculoskeletal Problem          [facility]   Clinic Visit s Sep 13, 2019    PCP: Janine Barrett      Sylvain is a 39 year old female who is seen as self referral for   Chief Complaint   Patient presents with     Left Elbow - Pain       Injury: Patient describes injury as using gardens scissors in .       Right hand dominant    Location of Pain: left elbow lateral, nonradiating   Duration of Pain: 3 month(s)  Rating of Pain at worst: 7/10  Rating of Pain Currently: 4/10  Pain is better with: activity avoidance   Pain is worse with: carrying (trash, groceries, laundry) and childcare  Treatment so far consists of: icyhot, heat, ice and ibuprofen  Associated symptoms: no distal numbness or tingling; denies swelling or warmth  Recent imaging completed: No recent imaging completed.  Prior History of related problems: none    Social History: is employed as a/an financial crimes investigator      Past Medical History:   Diagnosis Date     CARDIOVASCULAR SCREENING; LDL GOAL LESS THAN 160 2013       Patient Active Problem List    Diagnosis Date Noted     Encounter for triage in pregnant patient 2018     Priority: Medium     S/P  section 2018     Priority: Medium     Cervical insufficiency during pregnancy in first trimester, antepartum 2018     Priority: Medium     Cervical insufficiency during pregnancy in second trimester, antepartum 2018     Priority: Medium     PCOS (polycystic ovarian syndrome) 2018     Priority: Medium     Type 2 diabetes mellitus without complication, without long-term current use of insulin (H) 10/20/2017     Priority: Medium     Hyperlipidemia  LDL goal <70 10/20/2017     Priority: Medium     Atypical chest pain 10/20/2017     Priority: Medium     PROM (premature rupture of membranes) 2016     Priority: Medium     Type 2 diabetes mellitus affecting pregnancy, antepartum 10/31/2016     Priority: Medium     Dichorionic diamniotic twin gestation 10/31/2016     Priority: Medium      premature rupture of membranes (PPROM) with unknown onset of labor 10/31/2016     Priority: Medium     PPROM fetus 1 10/16/16 (19+2 wks)       Oligohydramnios antepartum, second trimester, fetus 1 10/31/2016     Priority: Medium     Insulin controlled gestational diabetes mellitus (GDM) in second trimester 10/25/2016     Priority: Medium      premature rupture of membranes (PPROM) delivered, current hospitalization 10/17/2016     Priority: Medium     CARDIOVASCULAR SCREENING; LDL GOAL LESS THAN 160 2013     Priority: Medium       Family History   Problem Relation Age of Onset     Heart Disease Mother      Diabetes Father      Diabetes Paternal Grandmother      Diabetes Paternal Grandfather        Social History     Socioeconomic History     Marital status:      Spouse name: Not on file     Number of children: Not on file     Years of education: Not on file     Highest education level: Not on file   Occupational History     Not on file   Social Needs     Financial resource strain: Not on file     Food insecurity:     Worry: Not on file     Inability: Not on file     Transportation needs:     Medical: Not on file     Non-medical: Not on file   Tobacco Use     Smoking status: Never Smoker     Smokeless tobacco: Never Used   Substance and Sexual Activity     Alcohol use: Yes     Alcohol/week: 0.0 oz     Comment: occ     Drug use: No     Past Surgical History:   Procedure Laterality Date      SECTION N/A 2018    Procedure:  SECTION - code c/s;  Surgeon: Clementine Siddiqi MD;  Location:  L+D           Review of Systems  "  Musculoskeletal: Positive for joint pain.   All other systems reviewed and are negative.        Physical Exam  /70   Ht 1.581 m (5' 2.25\")   Wt 66.2 kg (146 lb)   BMI 26.49 kg/m     Constitutional:well-developed, well-nourished, and in no distress.   Cardiovascular: Intact distal pulses.    Neurological: alert. Gait Normal:   Gait, station, stance, and balance appear normal for age  Skin: Skin is warm and dry.   Psychiatric: Mood and affect normal.   Respiratory: unlabored, speaks in full sentences  Lymph: no LAD, no lymphangitis      Left Elbow Exam     Tenderness   The patient is experiencing tenderness in the lateral epicondyle.     Range of Motion   Extension: normal   Flexion: normal   Pronation: normal   Supination: normal     Muscle Strength   Pronation:  5/5   Supination:  5/5     Tests   Varus: negative  Valgus: negative  Tinel's sign (cubital tunnel): negative    Other   Erythema: absent  Scars: absent  Sensation: normal  Pulse: present    Comments:  Posterior pain at full extension.  Pain with  and resisted wrist extension, full strength              ASSESSMENT/PLAN    ICD-10-CM    1. Lateral epicondylitis of left elbow M77.12      Nature of injury discussed noting degenerative nature of these injuries as opposed to inflammatory conditions.  Treatment options reviewed including continued rest, therapy either home program or formal hand therapy referral, and injection of cortisone or irritant therapy protocols.    Noted that PRP and autologous blood injections are considered investigational and not covered by health plans. NG patches can be helpful but may take several months to be effective. Cortisone injections are commonly done, and can be effective though are at risk for relapse, but pain control often valuable to many patients.     She opts to proceed with a cortisone injection today.  If recurrent, she should return for a recheck appointment and discuss further options but Is aware " injections would not be given repeatedly.        Hand / Upper Extremity Injection/Arthrocentesis: L elbow  Date/Time: 9/13/2019 9:27 AM  Performed by: Kulwant Zeng MD  Authorized by: Kulwant Zeng MD     Indications:  Pain  Needle Size:  27 G  Guidance: landmark    Approach:  Lateral  Condition: epicondylitis, lateral      Site:  L elbow  Medications:  1 mL lidocaine 1 %; 20 mg triamcinolone 40 MG/ML  Outcome:  Tolerated well, no immediate complications  Procedure discussed: discussed risks, benefits, and alternatives    Consent Given by:  Patient  Timeout: timeout called immediately prior to procedure    Prep: patient was prepped and draped in usual sterile fashion              Again, thank you for allowing me to participate in the care of your patient.        Sincerely,        Kulwant Zeng MD

## 2019-11-18 ENCOUNTER — NURSE TRIAGE (OUTPATIENT)
Dept: FAMILY MEDICINE | Facility: CLINIC | Age: 39
End: 2019-11-18

## 2019-11-18 DIAGNOSIS — F43.21 ADJUSTMENT DISORDER WITH DEPRESSED MOOD: Primary | ICD-10-CM

## 2019-11-18 NOTE — TELEPHONE ENCOUNTER
Thank you for the message. I know Julia well and would be happy to start her on a medication for this right away and then have her follow up with me.     I am going to put her on Zoloft 50 mg. She can start taking this today and then follow up with me in 2-3 weeks. If she wants to make an appointment for Mac as well I could see them both back to back on Dec. 4th at 1:40 and 2:00 pm.

## 2019-11-18 NOTE — TELEPHONE ENCOUNTER
"Patient calling and is quite tearful. She has a 9 month old son, Mac who is failing to thrive. They have been told that he may need a feeding tube to get his weight up. She is very stressed and feels that she is struggling with depression. She spoke with a nurse through her insurance company who advised her that she should send a message to her PCP to discuss possibly starting on some medication. She also mentions that she would like to get Mac in to see Dr. Barrett at some point to get a second opinion as she really trusts Dr. Barrett.    I did advise patient that she will likely need a visit with Dr. Barrett to discuss further, and she was understanding of this, but wanted to get a message to her first as she knows Dr. Barrett's access for appointments is limited.     Patient can be reached at [phone] (detailed voicemail OK). Please advise. Thank you.    Additional Information    Negative: Patient attempted suicide    Negative: Patient is threatening suicide now    Negative: Violent behavior, or threatening to physically hurt or kill someone    Negative: Patient is very confused (disoriented, slurred speech) and no other adult (e.g., friend or family member) available    Negative: Difficult to awaken or acting very confused (disoriented, slurred speech) and new onset    Negative: Sounds like a life-threatening emergency to the triager    Negative: Alcohol use, abuse or dependence, question or problem related to    Negative: Drug abuse or dependence, question or problem related to    Negative: Depression and unable to do any of normal activities (e.g., self care, school, work; in comparison to baseline).    Negative: Very strange or confused behavior    Negative: Patient sounds very sick or weak to the triager    Negative: Fever > 101 F (38.3 C)    Negative: Sometimes has thoughts of suicide    Answer Assessment - Initial Assessment Questions  1. CONCERN: \"What happened that made you call today?\"      Feeling very " "stressed with situation with sons health. Failure to thrive, premature, not gaining weight. It is taking a significant toll on her and she feels she may need to start medication for depression  2. DEPRESSION SYMPTOM SCREENING: \"How are you feeling overall?\" (e.g., decreased energy, increased sleeping or difficulty sleeping, difficulty concentrating, feelings of sadness, guilt, hopelessness, or worthlessness)      Decreased energy, always tired, just really feeling down  3. RISK OF HARM - SUICIDAL IDEATION:  \"Do you ever have thoughts of hurting or killing yourself?\"  (e.g., yes, no, no but preoccupation with thoughts about death)    - INTENT:  \"Do you have thoughts of hurting or killing yourself right NOW?\" (e.g., yes, no, N/A)    - PLAN: \"Do you have a specific plan for how you would do this?\" (e.g., gun, knife, overdose, no plan, N/A)      no  4. RISK OF HARM - HOMICIDAL IDEATION:  \"Do you ever have thoughts of hurting or killing someone else?\"  (e.g., yes, no, no but preoccupation with thoughts about death)    - INTENT:  \"Do you have thoughts of hurting or killing someone right NOW?\" (e.g., yes, no, N/A)    - PLAN: \"Do you have a specific plan for how you would do this?\" (e.g., gun, knife, no plan, N/A)       no  5. FUNCTIONAL IMPAIRMENT: \"How have things been going for you overall in your life? Have you had any more difficulties than usual doing your normal daily activities?\"  (e.g., better, same, worse; self-care, school, work, interactions)      Things are very stressful - son's health is not good. Appointments for him have started to impact work life. Home life is stressfull  6. SUPPORT: \"Who is with you now?\" \"Who do you live with?\" \"Do you have family or friends nearby who you can talk to?\"       , but he is very stressed as well and cannot really support her emotionally. No real family around. Does have one close friend that she is able to speak with  7. THERAPIST: \"Do you have a counselor or " "therapist? Name?\"      No  8. STRESSORS: \"Has there been any new stress or recent changes in your life?\"      Son's health has been poor. He was premature - is now 9 months old and is still struggling to gain weight - may need a feeding tube placed  9. DRUG ABUSE/ALCOHOL: \"Do you drink alcohol or use any illegal drugs?\"       no  10. OTHER: \"Do you have any other health or medical symptoms right now?\" (e.g., fever)        no  11. PREGNANCY: \"Is there any chance you are pregnant?\" \"When was your last menstrual period?\"        no    Protocols used: DEPRESSION-A-OH  DINORA Lombardo, RN  [facility]    "

## 2019-11-18 NOTE — TELEPHONE ENCOUNTER
Patient given message from Dr. Barrett. Patient verbalizes understanding and agrees with the plan. Patient did schedule follow up appointments for herself and Mac on 12/4/19. Kaylynn Victoria RN

## 2019-11-20 NOTE — TELEPHONE ENCOUNTER
Patient calling, reports she started sertraline yesterday afternoon and reports feeling a little nauseated today. She is wondering if this is normal. I advised GI upset is a normal side effect and that they tend to subside within 2-4 weeks after starting medication. She states she will try unisom to see if that helps. I advised if symptoms get worse to call and notify us. Patient/ parent verbalized understanding and agrees with plan.    Orly Cho RN   [facility] - Triage

## 2019-12-04 ENCOUNTER — OFFICE VISIT (OUTPATIENT)
Dept: FAMILY MEDICINE | Facility: CLINIC | Age: 39
End: 2019-12-04
Payer: COMMERCIAL

## 2019-12-04 VITALS
WEIGHT: 140 LBS | OXYGEN SATURATION: 96 % | DIASTOLIC BLOOD PRESSURE: 80 MMHG | TEMPERATURE: 99.1 F | BODY MASS INDEX: 25.4 KG/M2 | HEART RATE: 96 BPM | SYSTOLIC BLOOD PRESSURE: 118 MMHG

## 2019-12-04 DIAGNOSIS — F32.1 CURRENT MODERATE EPISODE OF MAJOR DEPRESSIVE DISORDER WITHOUT PRIOR EPISODE (H): Primary | ICD-10-CM

## 2019-12-04 DIAGNOSIS — E11.9 TYPE 2 DIABETES MELLITUS WITHOUT COMPLICATION, WITHOUT LONG-TERM CURRENT USE OF INSULIN (H): ICD-10-CM

## 2019-12-04 DIAGNOSIS — H35.52 RETINITIS PIGMENTOSA: ICD-10-CM

## 2019-12-04 LAB — HBA1C MFR BLD: 6.7 % (ref 0–5.6)

## 2019-12-04 PROCEDURE — 99214 OFFICE O/P EST MOD 30 MIN: CPT | Performed by: INTERNAL MEDICINE

## 2019-12-04 PROCEDURE — 83036 HEMOGLOBIN GLYCOSYLATED A1C: CPT | Performed by: INTERNAL MEDICINE

## 2019-12-04 PROCEDURE — 36415 COLL VENOUS BLD VENIPUNCTURE: CPT | Performed by: INTERNAL MEDICINE

## 2019-12-04 ASSESSMENT — ANXIETY QUESTIONNAIRES
GAD7 TOTAL SCORE: 12
6. BECOMING EASILY ANNOYED OR IRRITABLE: SEVERAL DAYS
5. BEING SO RESTLESS THAT IT IS HARD TO SIT STILL: NOT AT ALL
3. WORRYING TOO MUCH ABOUT DIFFERENT THINGS: MORE THAN HALF THE DAYS
IF YOU CHECKED OFF ANY PROBLEMS ON THIS QUESTIONNAIRE, HOW DIFFICULT HAVE THESE PROBLEMS MADE IT FOR YOU TO DO YOUR WORK, TAKE CARE OF THINGS AT HOME, OR GET ALONG WITH OTHER PEOPLE: SOMEWHAT DIFFICULT
2. NOT BEING ABLE TO STOP OR CONTROL WORRYING: MORE THAN HALF THE DAYS
7. FEELING AFRAID AS IF SOMETHING AWFUL MIGHT HAPPEN: NEARLY EVERY DAY
1. FEELING NERVOUS, ANXIOUS, OR ON EDGE: NEARLY EVERY DAY

## 2019-12-04 ASSESSMENT — PATIENT HEALTH QUESTIONNAIRE - PHQ9
5. POOR APPETITE OR OVEREATING: SEVERAL DAYS
SUM OF ALL RESPONSES TO PHQ QUESTIONS 1-9: 17

## 2019-12-04 NOTE — PROGRESS NOTES
Subjective     Sylvain Major is a 39 year old female who presents to clinic today for the following health issues:    HPI   Depression and Anxiety Follow-Up    How are you doing with your depression since your last visit? No change    How are you doing with your anxiety since your last visit?  No change    Are you having other symptoms that might be associated with depression or anxiety? Yes     Have you had a significant life event? OTHER: Recent diagnosis of Retinitis Pigmentosa     Do you have any concerns with your use of alcohol or other drugs? No    Julia is a 40 y/o female with a 1 year old son who was born very prematurely and has been struggled with poor weight gain, chronic respiratory problems, and possible gross developmental delays.     Julia was also diagnosed with Retinitis pigmentosa. Saw a retinal specialist. How long can she take care of Mac? Doesn't know the progression and this is scary.     Julia called here a few weeks ago about all of this. I sent her a script for Zoloft 50 mg. She is tolerating this well.    Social History     Tobacco Use     Smoking status: Never Smoker     Smokeless tobacco: Never Used   Substance Use Topics     Alcohol use: Yes     Alcohol/week: 0.0 standard drinks     Comment: occ     Drug use: No     No flowsheet data found.  No flowsheet data found.  No flowsheet data found.  No flowsheet data found.      Suicide Assessment Five-step Evaluation and Treatment (SAFE-T)      How many servings of fruits and vegetables do you eat daily?      On average, how many sweetened beverages do you drink each day (Examples: soda, juice, sweet tea, etc.  Do NOT count diet or artificially sweetened beverages)?       How many days per week do you miss taking your medication? 0       Patient Active Problem List   Diagnosis     CARDIOVASCULAR SCREENING; LDL GOAL LESS THAN 160      premature rupture of membranes (PPROM) delivered, current hospitalization     Insulin  controlled gestational diabetes mellitus (GDM) in second trimester     Type 2 diabetes mellitus affecting pregnancy, antepartum     Dichorionic diamniotic twin gestation      premature rupture of membranes (PPROM) with unknown onset of labor     Oligohydramnios antepartum, second trimester, fetus 1     PROM (premature rupture of membranes)     Type 2 diabetes mellitus without complication, without long-term current use of insulin (H)     Hyperlipidemia LDL goal <70     Atypical chest pain     PCOS (polycystic ovarian syndrome)     Cervical insufficiency during pregnancy in second trimester, antepartum     Cervical insufficiency during pregnancy in first trimester, antepartum     Encounter for triage in pregnant patient     S/P  section     Past Surgical History:   Procedure Laterality Date      SECTION N/A 2018    Procedure:  SECTION - code c/s;  Surgeon: Clementine Siddiqi MD;  Location:  L+D       Social History     Tobacco Use     Smoking status: Never Smoker     Smokeless tobacco: Never Used   Substance Use Topics     Alcohol use: Yes     Alcohol/week: 0.0 standard drinks     Comment: occ     Family History   Problem Relation Age of Onset     Heart Disease Mother      Diabetes Father      Diabetes Paternal Grandmother      Diabetes Paternal Grandfather            Reviewed and updated as needed this visit by Provider         Review of Systems   ROS COMP: Constitutional, HEENT, cardiovascular, pulmonary, gi and gu systems are negative, except as otherwise noted.      Objective    /80   Pulse 96   Temp 99.1  F (37.3  C) (Oral)   Wt 63.5 kg (140 lb)   LMP 2019   SpO2 96%   BMI 25.40 kg/m    Body mass index is 25.4 kg/m .  Physical Exam   GENERAL: healthy, alert and no distress  RESP: lungs clear to auscultation - no rales, rhonchi or wheezes  CV: regular rate and rhythm, normal S1 S2, no S3 or S4, no murmur, click or rub, no peripheral edema and  "peripheral pulses strong  PSYCH: mentation appears normal, affect normal/bright and tearful    Diagnostic Test Results:  Labs reviewed in Epic  PHQ-9 = 17        Assessment & Plan     1. Current moderate episode of major depressive disorder without prior episode (H)  Julia was recently diagnosed with retinitis pigmentosa and also has a 1-year-old who has quite a lot of health problems.  Together these have created a lot of stress for her and she is been struggling depressed mood.  I started her on Zoloft 50 mg 2 weeks ago and she is been tolerating this well.  Planning to increase to 75 mg daily.  She will call back in about 4 to 6 weeks and let me know how she is doing.  May consider consider increasing to 100 mg at that time.  Discussed the importance of relying on her friends and family for support through these difficult times.  She needs to take time for herself as well.  Recommend counseling.  She will consider.  - sertraline (ZOLOFT) 50 MG tablet; Take 1.5 tablets (75 mg) by mouth daily  Dispense: 135 tablet; Refill: 1    2. Retinitis pigmentosa  Follow up with your retinal specialist.    3. Type 2 diabetes mellitus without complication, without long-term current use of insulin (H)  - Hemoglobin A1c today  - Continue Metformin 2000 mg daily     BMI:   Estimated body mass index is 25.4 kg/m  as calculated from the following:    Height as of 9/13/19: 1.581 m (5' 2.25\").    Weight as of this encounter: 63.5 kg (140 lb).       Return in about 4 weeks (around 1/1/2020) for Depression/Anxiety Check.    Janine Barrett MD  [facility]      "

## 2019-12-04 NOTE — PROGRESS NOTES
Subjective     Sylvain Major is a 39 year old female who presents to clinic today for the following health issues:    HPI        Reviewed and updated as needed this visit by Provider         Review of Systems         Objective    There were no vitals taken for this visit.  There is no height or weight on file to calculate BMI.  Physical Exam   {Ex      .soap

## 2019-12-05 ASSESSMENT — ANXIETY QUESTIONNAIRES: GAD7 TOTAL SCORE: 12

## 2020-01-14 ENCOUNTER — TELEPHONE (OUTPATIENT)
Dept: FAMILY MEDICINE | Facility: CLINIC | Age: 40
End: 2020-01-14

## 2020-01-14 NOTE — TELEPHONE ENCOUNTER
Needs of attention regarding:  -Diabetes    Health Maintenance Topics with due status: Overdue       Topic Date Due    PREVENTIVE CARE VISIT 1980    DIABETIC FOOT EXAM 1980    DEPRESSION ACTION PLAN 1980    PNEUMOCOCCAL IMMUNIZATION 19-64 MEDIUM RISK 09/05/1999    HPV 09/05/2001    EYE EXAM 12/30/2019       Communication:  Please abstract the following data from this visit with this patient into the appropriate field in Epic:    Other Tests found in the patient's chart through Chart Review/Care Everywhere:    Eye exam with ophthalmology on this date: 12/30/18 No Retinopathy - [facility] - Hard Copy in Media     Note to Abstraction: If this section is blank, no results were found via Chart Review/Care Everywhere.

## 2020-03-02 ENCOUNTER — HEALTH MAINTENANCE LETTER (OUTPATIENT)
Age: 40
End: 2020-03-02

## 2020-03-05 DIAGNOSIS — E78.5 HYPERLIPIDEMIA LDL GOAL <70: ICD-10-CM

## 2020-03-05 RX ORDER — ATORVASTATIN CALCIUM 20 MG/1
TABLET, FILM COATED ORAL
Qty: 90 TABLET | Refills: 1 | Status: SHIPPED | OUTPATIENT
Start: 2020-03-05 | End: 2020-10-19

## 2020-03-05 NOTE — TELEPHONE ENCOUNTER
"Requested Prescriptions   Pending Prescriptions Disp Refills     atorvastatin (LIPITOR) 20 MG tablet [Pharmacy Med Name: ATORVASTATIN 20MG TABLETS] 90 tablet 1     Sig: TAKE 1 TABLET(20 MG) BY MOUTH DAILY       Statins Protocol Passed - 3/5/2020  9:30 AM        Passed - LDL on file in past 12 months     Recent Labs   Lab Test 09/10/19  1047   *             Passed - No abnormal creatine kinase in past 12 months     No lab results found.             Passed - Recent (12 mo) or future (30 days) visit within the authorizing provider's specialty     Patient has had an office visit with the authorizing provider or a provider within the authorizing providers department within the previous 12 mos or has a future within next 30 days. See \"Patient Info\" tab in inbasket, or \"Choose Columns\" in Meds & Orders section of the refill encounter.              Passed - Medication is active on med list        Passed - Patient is age 18 or older        Passed - No active pregnancy on record        Passed - No positive pregnancy test in past 12 months        Last Written Prescription Date:  9/12/2019  Last Fill Quantity: 90,  # refills: 1   Last office visit: 12/4/2019 with prescribing provider:  12/4/2019   Future Office Visit:      "

## 2020-05-11 ENCOUNTER — OFFICE VISIT (OUTPATIENT)
Dept: ORTHOPEDICS | Facility: CLINIC | Age: 40
End: 2020-05-11
Payer: COMMERCIAL

## 2020-05-11 VITALS
HEIGHT: 62 IN | DIASTOLIC BLOOD PRESSURE: 82 MMHG | BODY MASS INDEX: 25.95 KG/M2 | SYSTOLIC BLOOD PRESSURE: 117 MMHG | WEIGHT: 141 LBS

## 2020-05-11 DIAGNOSIS — M77.12 LATERAL EPICONDYLITIS OF LEFT ELBOW: Primary | ICD-10-CM

## 2020-05-11 PROCEDURE — 20551 NJX 1 TENDON ORIGIN/INSJ: CPT | Mod: LT | Performed by: FAMILY MEDICINE

## 2020-05-11 PROCEDURE — 99213 OFFICE O/P EST LOW 20 MIN: CPT | Mod: 25 | Performed by: FAMILY MEDICINE

## 2020-05-11 RX ORDER — TRIAMCINOLONE ACETONIDE 40 MG/ML
20 INJECTION, SUSPENSION INTRA-ARTICULAR; INTRAMUSCULAR
Status: DISCONTINUED | OUTPATIENT
Start: 2020-05-11 | End: 2021-01-20

## 2020-05-11 RX ORDER — LIDOCAINE HYDROCHLORIDE 10 MG/ML
0.5 INJECTION, SOLUTION INFILTRATION; PERINEURAL
Status: DISCONTINUED | OUTPATIENT
Start: 2020-05-11 | End: 2021-01-20

## 2020-05-11 RX ADMIN — LIDOCAINE HYDROCHLORIDE 0.5 ML: 10 INJECTION, SOLUTION INFILTRATION; PERINEURAL at 11:00

## 2020-05-11 RX ADMIN — TRIAMCINOLONE ACETONIDE 20 MG: 40 INJECTION, SUSPENSION INTRA-ARTICULAR; INTRAMUSCULAR at 11:00

## 2020-05-11 ASSESSMENT — MIFFLIN-ST. JEOR: SCORE: 1271.79

## 2020-05-11 NOTE — PROGRESS NOTES
[facility]   Follow-up Visit s May 11, 2020    PCP: Janine Barrett      Subjective:  Sylvain is a 39 year old female who is seen in follow up for evaluation of   Chief Complaint   Patient presents with     Left Elbow - Follow Up, Pain     Her last visit was on 9/13/19.  Since that time, symptoms have been worse than before. Sylvain Major is accompanied today by self.      Patient had a left elbow:  Lateral Epicondylitis Cortisone injection on 9/13/19 that provided ~ 90% relief, lasting for 4 - 6 week(s).  Pain has gradually returned, she is noting progressing weakness in left arm/elbow to point where she is concerned about dropping her infant due to pain over last 4 weeks.  Pain is causing significant difficulty with sleeping as well.    Patient has no swelling  Pain is located left lateral elbow, contact, progressing.   Patient denies any new injuries.    Patient's past medical, surgical, social and family histories are reviewed today.    History from previous visit on 9/13/19         [facility]   Clinic Visit s Sep 13, 2019    PCP: Janine Barrett      Sylvain is a 39 year old female who is seen as self referral for   Chief Complaint   Patient presents with     Left Elbow - Follow Up, Pain       Injury: Patient describes injury as using gardens scissors in June.       Right hand dominant    Location of Pain: left elbow lateral, nonradiating   Duration of Pain: 3 month(s)  Rating of Pain at worst: 7/10  Rating of Pain Currently: 4/10  Pain is better with: activity avoidance   Pain is worse with: carrying (trash, groceries, laundry) and childcare  Treatment so far consists of: icyhot, heat, ice and ibuprofen  Associated symptoms: no distal numbness or tingling; denies swelling or warmth  Recent imaging completed: No recent imaging completed.  Prior History of related problems: none    Social History: is employed as a/an financial crimes  investigator      Past Medical History:   Diagnosis Date     CARDIOVASCULAR SCREENING; LDL GOAL LESS THAN 160 2013       Patient Active Problem List    Diagnosis Date Noted     Current moderate episode of major depressive disorder without prior episode (H) 2019     Priority: Medium     Retinitis pigmentosa 2019     Priority: Medium     Encounter for triage in pregnant patient 2018     Priority: Medium     S/P  section 2018     Priority: Medium     Cervical insufficiency during pregnancy in first trimester, antepartum 2018     Priority: Medium     Cervical insufficiency during pregnancy in second trimester, antepartum 2018     Priority: Medium     PCOS (polycystic ovarian syndrome) 2018     Priority: Medium     Type 2 diabetes mellitus without complication, without long-term current use of insulin (H) 10/20/2017     Priority: Medium     Hyperlipidemia LDL goal <70 10/20/2017     Priority: Medium     Atypical chest pain 10/20/2017     Priority: Medium     PROM (premature rupture of membranes) 2016     Priority: Medium     Type 2 diabetes mellitus affecting pregnancy, antepartum 10/31/2016     Priority: Medium     Dichorionic diamniotic twin gestation 10/31/2016     Priority: Medium      premature rupture of membranes (PPROM) with unknown onset of labor 10/31/2016     Priority: Medium     PPROM fetus 1 10/16/16 (19+2 wks)       Oligohydramnios antepartum, second trimester, fetus 1 10/31/2016     Priority: Medium     Insulin controlled gestational diabetes mellitus (GDM) in second trimester 10/25/2016     Priority: Medium      premature rupture of membranes (PPROM) delivered, current hospitalization 10/17/2016     Priority: Medium     CARDIOVASCULAR SCREENING; LDL GOAL LESS THAN 160 2013     Priority: Medium       Family History   Problem Relation Age of Onset     Heart Disease Mother      Diabetes Father      Diabetes Paternal Grandmother   "    Diabetes Paternal Grandfather        Social History     Socioeconomic History     Marital status:      Spouse name: Not on file     Number of children: Not on file     Years of education: Not on file     Highest education level: Not on file   Occupational History     Not on file   Social Needs     Financial resource strain: Not on file     Food insecurity:     Worry: Not on file     Inability: Not on file     Transportation needs:     Medical: Not on file     Non-medical: Not on file   Tobacco Use     Smoking status: Never Smoker     Smokeless tobacco: Never Used   Substance and Sexual Activity     Alcohol use: Yes     Alcohol/week: 0.0 oz     Comment: occ     Drug use: No     Past Surgical History:   Procedure Laterality Date      SECTION N/A 2018    Procedure:  SECTION - code c/s;  Surgeon: Clementine Siddiqi MD;  Location: [facility]           Review of Systems   Musculoskeletal: Positive for joint pain.   All other systems reviewed and are negative.        Physical Exam  /82   Ht 1.581 m (5' 2.25\")   Wt 64 kg (141 lb)   BMI 25.58 kg/m    Constitutional:well-developed, well-nourished, and in no distress.   Cardiovascular: Intact distal pulses.    Neurological: alert. Gait Normal:   Gait, station, stance, and balance appear normal for age  Skin: Skin is warm and dry.   Psychiatric: Mood and affect normal.   Respiratory: unlabored, speaks in full sentences  Lymph: no LAD, no lymphangitis      Left Elbow Exam     Tenderness   The patient is experiencing tenderness in the lateral epicondyle.     Range of Motion   Extension: normal   Flexion: normal   Pronation: normal   Supination: normal     Muscle Strength   Pronation:  5/5   Supination:  5/5     Tests   Varus: negative  Valgus: negative  Tinel's sign (cubital tunnel): negative    Other   Erythema: absent  Scars: absent  Sensation: normal  Pulse: present    Comments:  Posterior pain at full extension.  Pain with  " and resisted wrist extension, full strength              ASSESSMENT/PLAN    ICD-10-CM    1. Lateral epicondylitis of left elbow  M77.12 [facility]       Recurrence she was comfortable proceeding.  Of left lateral epicondylitis.  She did experience some atrophy following the first injection.  Reassurance, that this was a result of cortisone and not an allergic reaction or an autoimmune reaction.  We discussed the concerns over repeated cortisone injections into the common extensor tendon.  She noted significant difficulty with childcare and was sleeping.  For quality of life measures and function, will repeat injection today at her request, but also will move forward with eccentric strengthening via hand therapy as well.      Hand / Upper Extremity Injection/Arthrocentesis: L elbow    Date/Time: 5/11/2020 11:00 AM  Performed by: Kulwant Zeng MD  Authorized by: Kulwant Zeng MD     Indications:  Tendon swelling, pain and therapeutic  Needle Size:  25 G  Guidance: landmark    Approach:  Lateral  Condition: epicondylitis, lateral      Site:  L elbow  Medications:  0.5 mL lidocaine 1 %; 20 mg triamcinolone 40 MG/ML  Outcome:  Tolerated well, no immediate complications  Procedure discussed: discussed risks, benefits, and alternatives    Consent Given by:  Patient  Timeout: timeout called immediately prior to procedure    Prep: patient was prepped and draped in usual sterile fashion

## 2020-05-11 NOTE — LETTER
5/11/2020         RE: Sylvain Major  [address] [place] [address]        Dear Colleague,    Thank you for referring your patient, Sylvain Major, to the  [facility]. Please see a copy of my visit note below.    HPI   [facility]   Follow-up Visit s May 11, 2020    PCP: Janine Barrett      Subjective:  Sylvain is a 39 year old female who is seen in follow up for evaluation of   Chief Complaint   Patient presents with     Left Elbow - Follow Up, Pain     Her last visit was on 9/13/19.  Since that time, symptoms have been worse than before. Sylvain Major is accompanied today by self.      Patient had a left elbow:  Lateral Epicondylitis Cortisone injection on 9/13/19 that provided ~ 90% relief, lasting for 4 - 6 week(s).  Pain has gradually returned, she is noting progressing weakness in left arm/elbow to point where she is concerned about dropping her infant due to pain over last 4 weeks.  Pain is causing significant difficulty with sleeping as well.    Patient has no swelling  Pain is located left lateral elbow, contact, progressing.   Patient denies any new injuries.    Patient's past medical, surgical, social and family histories are reviewed today.    History from previous visit on 9/13/19         [facility]   Clinic Visit s Sep 13, 2019    PCP: Janine Barrett      Sylvain is a 39 year old female who is seen as self referral for   Chief Complaint   Patient presents with     Left Elbow - Follow Up, Pain       Injury: Patient describes injury as using gardens scissors in June.       Right hand dominant    Location of Pain: left elbow lateral, nonradiating   Duration of Pain: 3 month(s)  Rating of Pain at worst: 7/10  Rating of Pain Currently: 4/10  Pain is better with: activity avoidance   Pain is worse with: carrying (trash, groceries, laundry) and childcare  Treatment so far consists of: icyhot, heat,  ice and ibuprofen  Associated symptoms: no distal numbness or tingling; denies swelling or warmth  Recent imaging completed: No recent imaging completed.  Prior History of related problems: none    Social History: is employed as a/an financial crimes investigator      Past Medical History:   Diagnosis Date     CARDIOVASCULAR SCREENING; LDL GOAL LESS THAN 160 2013       Patient Active Problem List    Diagnosis Date Noted     Current moderate episode of major depressive disorder without prior episode (H) 2019     Priority: Medium     Retinitis pigmentosa 2019     Priority: Medium     Encounter for triage in pregnant patient 2018     Priority: Medium     S/P  section 2018     Priority: Medium     Cervical insufficiency during pregnancy in first trimester, antepartum 2018     Priority: Medium     Cervical insufficiency during pregnancy in second trimester, antepartum 2018     Priority: Medium     PCOS (polycystic ovarian syndrome) 2018     Priority: Medium     Type 2 diabetes mellitus without complication, without long-term current use of insulin (H) 10/20/2017     Priority: Medium     Hyperlipidemia LDL goal <70 10/20/2017     Priority: Medium     Atypical chest pain 10/20/2017     Priority: Medium     PROM (premature rupture of membranes) 2016     Priority: Medium     Type 2 diabetes mellitus affecting pregnancy, antepartum 10/31/2016     Priority: Medium     Dichorionic diamniotic twin gestation 10/31/2016     Priority: Medium      premature rupture of membranes (PPROM) with unknown onset of labor 10/31/2016     Priority: Medium     PPROM fetus 1 10/16/16 (19+2 wks)       Oligohydramnios antepartum, second trimester, fetus 1 10/31/2016     Priority: Medium     Insulin controlled gestational diabetes mellitus (GDM) in second trimester 10/25/2016     Priority: Medium      premature rupture of membranes (PPROM) delivered, current hospitalization  "10/17/2016     Priority: Medium     CARDIOVASCULAR SCREENING; LDL GOAL LESS THAN 160 2013     Priority: Medium       Family History   Problem Relation Age of Onset     Heart Disease Mother      Diabetes Father      Diabetes Paternal Grandmother      Diabetes Paternal Grandfather        Social History     Socioeconomic History     Marital status:      Spouse name: Not on file     Number of children: Not on file     Years of education: Not on file     Highest education level: Not on file   Occupational History     Not on file   Social Needs     Financial resource strain: Not on file     Food insecurity:     Worry: Not on file     Inability: Not on file     Transportation needs:     Medical: Not on file     Non-medical: Not on file   Tobacco Use     Smoking status: Never Smoker     Smokeless tobacco: Never Used   Substance and Sexual Activity     Alcohol use: Yes     Alcohol/week: 0.0 oz     Comment: occ     Drug use: No     Past Surgical History:   Procedure Laterality Date      SECTION N/A 2018    Procedure:  SECTION - code c/s;  Surgeon: Clmeentine Siddiqi MD;  [facility]           Review of Systems   Musculoskeletal: Positive for joint pain.   All other systems reviewed and are negative.        Physical Exam  /82   Ht 1.581 m (5' 2.25\")   Wt 64 kg (141 lb)   BMI 25.58 kg/m    Constitutional:well-developed, well-nourished, and in no distress.   Cardiovascular: Intact distal pulses.    Neurological: alert. Gait Normal:   Gait, station, stance, and balance appear normal for age  Skin: Skin is warm and dry.   Psychiatric: Mood and affect normal.   Respiratory: unlabored, speaks in full sentences  Lymph: no LAD, no lymphangitis      Left Elbow Exam     Tenderness   The patient is experiencing tenderness in the lateral epicondyle.     Range of Motion   Extension: normal   Flexion: normal   Pronation: normal   Supination: normal     Muscle Strength   Pronation:  " 5/5   Supination:  5/5     Tests   Varus: negative  Valgus: negative  Tinel's sign (cubital tunnel): negative    Other   Erythema: absent  Scars: absent  Sensation: normal  Pulse: present    Comments:  Posterior pain at full extension.  Pain with  and resisted wrist extension, full strength              ASSESSMENT/PLAN    ICD-10-CM    1. Lateral epicondylitis of left elbow  M77.12 [facility]       Recurrence she was comfortable proceeding.  Of left lateral epicondylitis.  She did experience some atrophy following the first injection.  Reassurance, that this was a result of cortisone and not an allergic reaction or an autoimmune reaction.  We discussed the concerns over repeated cortisone injections into the common extensor tendon.  She noted significant difficulty with childcare and was sleeping.  For quality of life measures and function, will repeat injection today at her request, but also will move forward with eccentric strengthening via hand therapy as well.      Hand / Upper Extremity Injection/Arthrocentesis: L elbow    Date/Time: 5/11/2020 11:00 AM  Performed by: Kulwant Zeng MD  Authorized by: Kulwant Zeng MD     Indications:  Tendon swelling, pain and therapeutic  Needle Size:  25 G  Guidance: landmark    Approach:  Lateral  Condition: epicondylitis, lateral      Site:  L elbow  Medications:  0.5 mL lidocaine 1 %; 20 mg triamcinolone 40 MG/ML  Outcome:  Tolerated well, no immediate complications  Procedure discussed: discussed risks, benefits, and alternatives    Consent Given by:  Patient  Timeout: timeout called immediately prior to procedure    Prep: patient was prepped and draped in usual sterile fashion          Again, thank you for allowing me to participate in the care of your patient.        Sincerely,        Kulwant Zneg MD

## 2020-05-23 ENCOUNTER — OFFICE VISIT (OUTPATIENT)
Dept: URGENT CARE | Facility: URGENT CARE | Age: 40
End: 2020-05-23
Payer: COMMERCIAL

## 2020-05-23 VITALS
DIASTOLIC BLOOD PRESSURE: 80 MMHG | SYSTOLIC BLOOD PRESSURE: 130 MMHG | RESPIRATION RATE: 20 BRPM | BODY MASS INDEX: 26.13 KG/M2 | HEART RATE: 92 BPM | WEIGHT: 144 LBS | TEMPERATURE: 98 F

## 2020-05-23 DIAGNOSIS — J01.90 ACUTE SINUSITIS WITH SYMPTOMS > 10 DAYS: Primary | ICD-10-CM

## 2020-05-23 PROCEDURE — 99213 OFFICE O/P EST LOW 20 MIN: CPT | Performed by: FAMILY MEDICINE

## 2020-05-23 NOTE — PROGRESS NOTES
Subjective     HPI   Hx of seasonal allergies.  Has had increased sinus pain and pressure in past 2 weeks first starting over RIGHT frontal area now over B frontal sinuses and eyes.  No fever or chills or respiratory symptoms.  Just started Zyrtec and Flonase yesterday with no relief.  Usually gets a sinus infection each spring.  Hx of Type 2 DM.      Patient Active Problem List   Diagnosis     CARDIOVASCULAR SCREENING; LDL GOAL LESS THAN 160      premature rupture of membranes (PPROM) delivered, current hospitalization     Insulin controlled gestational diabetes mellitus (GDM) in second trimester     Type 2 diabetes mellitus affecting pregnancy, antepartum     Dichorionic diamniotic twin gestation      premature rupture of membranes (PPROM) with unknown onset of labor     Oligohydramnios antepartum, second trimester, fetus 1     PROM (premature rupture of membranes)     Type 2 diabetes mellitus without complication, without long-term current use of insulin (H)     Hyperlipidemia LDL goal <70     Atypical chest pain     PCOS (polycystic ovarian syndrome)     Cervical insufficiency during pregnancy in second trimester, antepartum     Cervical insufficiency during pregnancy in first trimester, antepartum     Encounter for triage in pregnant patient     S/P  section     Current moderate episode of major depressive disorder without prior episode (H)     Retinitis pigmentosa     Past Surgical History:   Procedure Laterality Date      SECTION N/A 2018    Procedure:  SECTION - code c/s;  Surgeon: Clementine Siddiqi MD;  Location:  L+D       Social History     Tobacco Use     Smoking status: Never Smoker     Smokeless tobacco: Never Used   Substance Use Topics     Alcohol use: Yes     Alcohol/week: 0.0 standard drinks     Comment: occ     Family History   Problem Relation Age of Onset     Heart Disease Mother      Diabetes Father      Diabetes Paternal Grandmother       Diabetes Paternal Grandfather          Current Outpatient Medications   Medication Sig Dispense Refill     amoxicillin-clavulanate (AUGMENTIN) 875-125 MG tablet Take 1 tablet by mouth 2 times daily for 10 days 20 tablet 0     atorvastatin (LIPITOR) 20 MG tablet TAKE 1 TABLET(20 MG) BY MOUTH DAILY 90 tablet 1     metFORMIN (GLUCOPHAGE) 1000 MG tablet 2,000 mg       Omega-3 Fatty Acids (FISH OIL PO)        sertraline (ZOLOFT) 50 MG tablet Take 1.5 tablets (75 mg) by mouth daily 135 tablet 1     acetaminophen (TYLENOL) 325 MG tablet Take 1-2 tablets (325-650 mg) by mouth every 6 hours as needed for mild pain 60 tablet 0     calcium-vitamin D-vitamin K (VIACTIV) 500-500-40 MG-UNT-MCG CHEW Take 1 tablet by mouth 2 times daily       ibuprofen (ADVIL/MOTRIN) 600 MG tablet Take 1 tablet (600 mg) by mouth every 6 hours as needed for moderate pain 30 tablet 1     No Known Allergies  Recent Labs   Lab Test 12/04/19  1433 09/10/19  1047 11/12/18  1651 04/13/18  1001 12/15/17  0752 09/13/17   A1C 6.7* 6.8* 4.9 6.1* 6.9* 9.1*   LDL  --  170*  --   --  38 174*   HDL  --  45*  --   --  50 51   TRIG  --  270*  --   --  99 177*   ALT  --   --   --   --  24 59   CR  --  0.38*  --  0.47* 0.47* 0.57   GFRESTIMATED  --  >90  --  >90 >90 >60   GFRESTBLACK  --  >90  --  >90 >90 >60   POTASSIUM  --  4.2  --  3.8 4.0 4.0   TSH  --   --   --  2.08  --   --       BP Readings from Last 3 Encounters:   05/23/20 130/80   05/11/20 117/82   12/04/19 118/80    Wt Readings from Last 3 Encounters:   05/23/20 65.3 kg (144 lb)   05/11/20 64 kg (141 lb)   12/04/19 63.5 kg (140 lb)                    Reviewed and updated as needed this visit by Provider         Review of Systems   ROS COMP: Constitutional, HEENT, cardiovascular, pulmonary, GI, , musculoskeletal, neuro, skin, endocrine and psych systems are negative, except as otherwise noted.      Objective    /80 (Cuff Size: Adult Regular)   Pulse 92   Temp 98  F (36.7  C) (Oral)   Resp 20    Wt 65.3 kg (144 lb)   BMI 26.13 kg/m      Physical Exam   GENERAL: healthy, alert and no distress  EYES: Eyes grossly normal to inspection, PERRL and conjunctivae and sclerae normal  HENT: ear canals and TM's normal, nose and mouth without ulcers or lesions, increased sinus pain on palp over frontal and maxillary sinuses  NECK: no adenopathy, no asymmetry, masses, or scars  MS: no gross musculoskeletal defects noted, no edema  SKIN: no suspicious lesions or rashes  NEURO: Normal strength and tone, mentation intact and speech normal  PSYCH: mentation appears normal, affect normal/bright            Assessment & Plan     1. Acute sinusitis with symptoms > 10 days    - amoxicillin-clavulanate (AUGMENTIN) 875-125 MG tablet; Take 1 tablet by mouth 2 times daily for 10 days  Dispense: 20 tablet; Refill: 0     May continue with Zyrtec and Flonase as needed.  Increased fluids.  Follow-up if no change or worsening symptoms prn.    Janine Dee MD  [facility]

## 2020-06-09 DIAGNOSIS — F32.1 CURRENT MODERATE EPISODE OF MAJOR DEPRESSIVE DISORDER WITHOUT PRIOR EPISODE (H): ICD-10-CM

## 2020-06-10 NOTE — TELEPHONE ENCOUNTER
Spoke with patient and she will log in today to complete PHQ9 via [employer] Will follow up tomorrow if not complete.    Verónica Muir RN, BSN  [facility]

## 2020-06-12 NOTE — TELEPHONE ENCOUNTER
3rd attempt: Left a non-detailed message and call back number [phone].     Advised patient to check her [employer] message.     Verónica Muir RN, BSN  [facility]

## 2020-10-18 DIAGNOSIS — E78.5 HYPERLIPIDEMIA LDL GOAL <70: ICD-10-CM

## 2020-10-19 RX ORDER — ATORVASTATIN CALCIUM 20 MG/1
TABLET, FILM COATED ORAL
Qty: 90 TABLET | Refills: 1 | Status: SHIPPED | OUTPATIENT
Start: 2020-10-19 | End: 2021-01-20

## 2020-10-19 NOTE — TELEPHONE ENCOUNTER
Failed ldl    please route to  team if patient needs an appointment     Ny REEDRN BSN  [facility]  [phone]

## 2020-11-04 ENCOUNTER — TELEPHONE (OUTPATIENT)
Dept: ORTHOPEDICS | Facility: CLINIC | Age: 40
End: 2020-11-04

## 2020-11-04 NOTE — TELEPHONE ENCOUNTER
"Called and spoke with patient.  She notes worsening pain in left elbow.  States that steroid injection performed on 5/11/20 only provided 1 week of relief. She has tried wearing a wrist brace with minimal relief.  States that she is unable to attend hand therapy due to having an infant with special needs.     She expressed \"the pain is so bad that sometimes I feel like dying would be better.\"  I explained she would need to present to the ED immediately.  She then states \"I am not going to kill myself, the pain is just that bad.\" I advise she contact her PCP to follow up for treatment of depression.  She agreed and will reach out to their office.      She would like to discuss next steps for treating elbow pain and is interested in possible surgical referral. I explained that I would pass this message along to Dr. Zeng for his recommendations. Patient expressed understanding.      Thank you,  Meaghan Luke ATC   "

## 2020-11-04 NOTE — TELEPHONE ENCOUNTER
PT is having additional pain and can't do hand therapy, due to  has an infant, wants to know next step.

## 2020-11-04 NOTE — TELEPHONE ENCOUNTER
Agree with ER visit for intractable pain and possible suicidal ideation, otherwise recommend return visit for recheck. Last seen 6 months ago

## 2020-11-04 NOTE — TELEPHONE ENCOUNTER
Return call to patient.    Informed her of Dr. Zeng's recommendations. She was agreeable to come into clinic for a recheck and states that she would not do anything to hurt herself and does not feel like she needs to go to the ED at this time.    Patient requests appt in [place]. Appt scheduled for 11/11/20. Patient was agreeable to this plan and will call back if she has any other questions or concerns prior to follow up appt.    Oma Sprague, ATC

## 2020-11-11 ENCOUNTER — OFFICE VISIT (OUTPATIENT)
Dept: ORTHOPEDICS | Facility: CLINIC | Age: 40
End: 2020-11-11
Payer: COMMERCIAL

## 2020-11-11 VITALS
HEIGHT: 62 IN | SYSTOLIC BLOOD PRESSURE: 118 MMHG | WEIGHT: 140 LBS | BODY MASS INDEX: 25.76 KG/M2 | DIASTOLIC BLOOD PRESSURE: 82 MMHG

## 2020-11-11 DIAGNOSIS — M77.12 LATERAL EPICONDYLITIS OF LEFT ELBOW: Primary | ICD-10-CM

## 2020-11-11 PROCEDURE — 99213 OFFICE O/P EST LOW 20 MIN: CPT | Performed by: FAMILY MEDICINE

## 2020-11-11 ASSESSMENT — MIFFLIN-ST. JEOR: SCORE: 1262.26

## 2020-11-11 NOTE — PROGRESS NOTES
[facility]   Follow-up Visit s 2020    PCP: Janine Barrett      Subjective:  Sylvain is a 40 year old female who is seen in follow up for evaluation of   Chief Complaint   Patient presents with     Left Elbow - Follow Up     Her last visit was on 2020.  Since that time, symptoms have been worse than before. Sylvain Major is accompanied today by self.       Patient had a left elbow:  Lateral Epicondylitis Cortisone injection on 2019 and 2020 that provided partial relief, lasting for 2 week(s).   Is unable to go to hand therapy due to childcare.  Thought she would go to [place] [facility] for surgery but is unable to pandemic.  Patient reports no improvement since last visit.  Patient has noticed worsened symptoms with wrist bracing, elbow bracing, ice, heat, ES tylenol PRN, ibuprofen, icy hot, Biofreeze, voltaren gel treatment.    Patient has no swelling  Pain is located left lateral elbow, getting progressively worse. Patient also notes new pain radiating from left sided neck down left arm to elbow and distal ulnar aspect of left wrist.  Patient is using no aids.    Patient denies any new injuries.    Patient's past medical, surgical, social and family histories are reviewed today.    Social History: is employed as a/an financial crimes investigator      Past Medical History:   Diagnosis Date     CARDIOVASCULAR SCREENING; LDL GOAL LESS THAN 160 2013       Patient Active Problem List    Diagnosis Date Noted     Current moderate episode of major depressive disorder without prior episode (H) 2019     Priority: Medium     Retinitis pigmentosa 2019     Priority: Medium     Encounter for triage in pregnant patient 2018     Priority: Medium     S/P  section 2018     Priority: Medium     Cervical insufficiency during pregnancy in first trimester, antepartum 2018     Priority: Medium     Cervical  insufficiency during pregnancy in second trimester, antepartum 2018     Priority: Medium     PCOS (polycystic ovarian syndrome) 2018     Priority: Medium     Type 2 diabetes mellitus without complication, without long-term current use of insulin (H) 10/20/2017     Priority: Medium     Hyperlipidemia LDL goal <70 10/20/2017     Priority: Medium     Atypical chest pain 10/20/2017     Priority: Medium     PROM (premature rupture of membranes) 2016     Priority: Medium     Type 2 diabetes mellitus affecting pregnancy, antepartum 10/31/2016     Priority: Medium     Dichorionic diamniotic twin gestation 10/31/2016     Priority: Medium      premature rupture of membranes (PPROM) with unknown onset of labor 10/31/2016     Priority: Medium     PPROM fetus 1 10/16/16 (19+2 wks)       Oligohydramnios antepartum, second trimester, fetus 1 10/31/2016     Priority: Medium     Insulin controlled gestational diabetes mellitus (GDM) in second trimester 10/25/2016     Priority: Medium      premature rupture of membranes (PPROM) delivered, current hospitalization 10/17/2016     Priority: Medium     CARDIOVASCULAR SCREENING; LDL GOAL LESS THAN 160 2013     Priority: Medium       Family History   Problem Relation Age of Onset     Heart Disease Mother      Diabetes Father      Diabetes Paternal Grandmother      Diabetes Paternal Grandfather        Social History     Socioeconomic History     Marital status:      Spouse name: Not on file     Number of children: Not on file     Years of education: Not on file     Highest education level: Not on file   Occupational History     Not on file   Social Needs     Financial resource strain: Not on file     Food insecurity:     Worry: Not on file     Inability: Not on file     Transportation needs:     Medical: Not on file     Non-medical: Not on file   Tobacco Use     Smoking status: Never Smoker     Smokeless tobacco: Never Used   Substance and Sexual  "Activity     Alcohol use: Yes     Alcohol/week: 0.0 oz     Comment: occ     Drug use: No     Past Surgical History:   Procedure Laterality Date      SECTION N/A 2018    Procedure:  SECTION - code c/s;  Surgeon: Clementine Siddiqi MD;  Location:  L+D           Review of Systems   Musculoskeletal: Positive for joint pain.   All other systems reviewed and are negative.        Physical Exam  /82   Ht 1.581 m (5' 2.25\")   Wt 63.5 kg (140 lb)   BMI 25.40 kg/m    Constitutional:well-developed, well-nourished, and in no distress.   Cardiovascular: Intact distal pulses.    Neurological: alert. Gait Normal:   Gait, station, stance, and balance appear normal for age  Skin: Skin is warm and dry.   Psychiatric: Mood and affect normal.   Respiratory: unlabored, speaks in full sentences  Lymph: no LAD, no lymphangitis      Left Elbow Exam     Tenderness   The patient is experiencing tenderness in the lateral epicondyle.     Range of Motion   Extension: normal   Flexion: normal   Pronation: normal   Supination: normal     Muscle Strength   Pronation:  5/5   Supination:  5/5     Tests   Varus: negative  Valgus: negative  Tinel's sign (cubital tunnel): negative    Other   Erythema: absent  Scars: absent  Sensation: normal  Pulse: present    Comments:  Posterior pain at full extension.  Pain with  and resisted wrist extension, full strength              ASSESSMENT/PLAN    ICD-10-CM    1. Lateral epicondylitis of left elbow  M77.12 MR Elbow Left w/o Contrast     Orthopedic & Spine  Referral       Recalcitrant lateral epicondylitis.  MRI to confirm.  We will have her follow-up with surgery for possible Tenex or open procedure.  Warned that an MRI or surgical procedure may require a trial of hand therapy first.      Procedures  "

## 2020-11-11 NOTE — LETTER
11/11/2020         RE: Sylvain Major  [address] [place] [address]        Dear Colleague,    Thank you for referring your patient, Sylvain Major, to the [facility] [place]. Please see a copy of my visit note below.    [facility]   Follow-up Visit s Nov 11, 2020    PCP: Janine Barrett      Subjective:  Sylvain is a 40 year old female who is seen in follow up for evaluation of   Chief Complaint   Patient presents with     Left Elbow - Follow Up     Her last visit was on 5/11/2020.  Since that time, symptoms have been worse than before. Sylvain Major is accompanied today by self.       Patient had a left elbow:  Lateral Epicondylitis Cortisone injection on 9/13/2019 and 5/11/2020 that provided partial relief, lasting for 2 week(s).   Is unable to go to hand therapy due to childcare.  Thought she would go to her native [place] [facility] for surgery but is unable to pandemic.  Patient reports no improvement since last visit.  Patient has noticed worsened symptoms with wrist bracing, elbow bracing, ice, heat, ES tylenol PRN, ibuprofen, icy hot, Biofreeze, voltaren gel treatment.    Patient has no swelling  Pain is located left lateral elbow, getting progressively worse. Patient also notes new pain radiating from left sided neck down left arm to elbow and distal ulnar aspect of left wrist.  Patient is using no aids.    Patient denies any new injuries.    Patient's past medical, surgical, social and family histories are reviewed today.    Social History: is employed as a/an financial crimes investigator      Past Medical History:   Diagnosis Date     CARDIOVASCULAR SCREENING; LDL GOAL LESS THAN 160 1/2/2013       Patient Active Problem List    Diagnosis Date Noted     Current moderate episode of major depressive disorder without prior episode (H) 12/04/2019     Priority: Medium     Retinitis pigmentosa  2019     Priority: Medium     Encounter for triage in pregnant patient 2018     Priority: Medium     S/P  section 2018     Priority: Medium     Cervical insufficiency during pregnancy in first trimester, antepartum 2018     Priority: Medium     Cervical insufficiency during pregnancy in second trimester, antepartum 2018     Priority: Medium     PCOS (polycystic ovarian syndrome) 2018     Priority: Medium     Type 2 diabetes mellitus without complication, without long-term current use of insulin (H) 10/20/2017     Priority: Medium     Hyperlipidemia LDL goal <70 10/20/2017     Priority: Medium     Atypical chest pain 10/20/2017     Priority: Medium     PROM (premature rupture of membranes) 2016     Priority: Medium     Type 2 diabetes mellitus affecting pregnancy, antepartum 10/31/2016     Priority: Medium     Dichorionic diamniotic twin gestation 10/31/2016     Priority: Medium      premature rupture of membranes (PPROM) with unknown onset of labor 10/31/2016     Priority: Medium     PPROM fetus 1 10/16/16 (19+2 wks)       Oligohydramnios antepartum, second trimester, fetus 1 10/31/2016     Priority: Medium     Insulin controlled gestational diabetes mellitus (GDM) in second trimester 10/25/2016     Priority: Medium      premature rupture of membranes (PPROM) delivered, current hospitalization 10/17/2016     Priority: Medium     CARDIOVASCULAR SCREENING; LDL GOAL LESS THAN 160 2013     Priority: Medium       Family History   Problem Relation Age of Onset     Heart Disease Mother      Diabetes Father      Diabetes Paternal Grandmother      Diabetes Paternal Grandfather        Social History     Socioeconomic History     Marital status:      Spouse name: Not on file     Number of children: Not on file     Years of education: Not on file     Highest education level: Not on file   Occupational History     Not on file   Social Needs      "Financial resource strain: Not on file     Food insecurity:     Worry: Not on file     Inability: Not on file     Transportation needs:     Medical: Not on file     Non-medical: Not on file   Tobacco Use     Smoking status: Never Smoker     Smokeless tobacco: Never Used   Substance and Sexual Activity     Alcohol use: Yes     Alcohol/week: 0.0 oz     Comment: occ     Drug use: No     Past Surgical History:   Procedure Laterality Date      SECTION N/A 2018    Procedure:  SECTION - code c/s;  Surgeon: Clementine Siddiqi MD;  Location: [facility] L+D           Review of Systems   Musculoskeletal: Positive for joint pain.   All other systems reviewed and are negative.        Physical Exam  /82   Ht 1.581 m (5' 2.25\")   Wt 63.5 kg (140 lb)   BMI 25.40 kg/m    Constitutional:well-developed, well-nourished, and in no distress.   Cardiovascular: Intact distal pulses.    Neurological: alert. Gait Normal:   Gait, station, stance, and balance appear normal for age  Skin: Skin is warm and dry.   Psychiatric: Mood and affect normal.   Respiratory: unlabored, speaks in full sentences  Lymph: no LAD, no lymphangitis      Left Elbow Exam     Tenderness   The patient is experiencing tenderness in the lateral epicondyle.     Range of Motion   Extension: normal   Flexion: normal   Pronation: normal   Supination: normal     Muscle Strength   Pronation:  5/5   Supination:  5/5     Tests   Varus: negative  Valgus: negative  Tinel's sign (cubital tunnel): negative    Other   Erythema: absent  Scars: absent  Sensation: normal  Pulse: present    Comments:  Posterior pain at full extension.  Pain with  and resisted wrist extension, full strength              ASSESSMENT/PLAN    ICD-10-CM    1. Lateral epicondylitis of left elbow  M77.12 MR Elbow Left w/o Contrast     Orthopedic & Spine  Referral       Recalcitrant lateral epicondylitis.  MRI to confirm.  We will have her follow-up with surgery for " possible Tenex or open procedure.  Warned that an MRI or surgical procedure may require a trial of hand therapy first.      Procedures      Again, thank you for allowing me to participate in the care of your patient.        Sincerely,        Kulwant Zeng MD

## 2020-11-30 ENCOUNTER — HOSPITAL ENCOUNTER (OUTPATIENT)
Dept: MRI IMAGING | Facility: CLINIC | Age: 40
Discharge: HOME | End: 2020-11-30
Attending: FAMILY MEDICINE | Admitting: FAMILY MEDICINE
Payer: COMMERCIAL

## 2020-11-30 DIAGNOSIS — M77.12 LATERAL EPICONDYLITIS OF LEFT ELBOW: ICD-10-CM

## 2020-11-30 PROCEDURE — 73221 MRI JOINT UPR EXTREM W/O DYE: CPT | Mod: LT

## 2020-12-14 ENCOUNTER — HEALTH MAINTENANCE LETTER (OUTPATIENT)
Age: 40
End: 2020-12-14

## 2020-12-16 ENCOUNTER — OFFICE VISIT (OUTPATIENT)
Dept: ORTHOPEDICS | Facility: CLINIC | Age: 40
End: 2020-12-16
Payer: COMMERCIAL

## 2020-12-16 VITALS
DIASTOLIC BLOOD PRESSURE: 82 MMHG | WEIGHT: 140 LBS | BODY MASS INDEX: 25.76 KG/M2 | HEIGHT: 62 IN | SYSTOLIC BLOOD PRESSURE: 118 MMHG

## 2020-12-16 DIAGNOSIS — M77.12 LATERAL EPICONDYLITIS OF LEFT ELBOW: Primary | ICD-10-CM

## 2020-12-16 PROCEDURE — 99204 OFFICE O/P NEW MOD 45 MIN: CPT | Performed by: STUDENT IN AN ORGANIZED HEALTH CARE EDUCATION/TRAINING PROGRAM

## 2020-12-16 ASSESSMENT — MIFFLIN-ST. JEOR: SCORE: 1262.26

## 2020-12-16 NOTE — LETTER
12/16/2020         RE: Sylvain Major  [address] [place] [address]        Dear Colleague,    Thank you for referring your patient, Sylvain Major, to the [facility] [place]. Please see a copy of my visit note below.    Hand Surgery History & Physical    REFERRING PHYSICIAN: Kulwant Zeng   PRIMARY CARE PHYSICIAN: Janine Barrett           Chief Complaint:   Pain of the Left Elbow      History of Present Illness:  Symptom Profile Including: location of symptoms, onset, severity, exacerbating/alleviating factors, previous treatments:        Sylvain Major is a 40 year old female who presents for evaluation of left elbow pain.  She states that this elbow pain began in May 2019 when she was trimming shrubs outside her house.  2 to 3 days later she experienced significant pain at the lateral aspect of her elbow.  She tried treating this with icy hot and [place] but did not get significant relief.  In September 2019 she received a corticosteroid injection for lateral epicondylitis and states that this gave her 2 months of relief.  She had a subsequent steroid injection which gave very short-term relief.  She is in constant pain, in particular pain is worsened with doing kickboxing exercises which she tries to do for exercise.  She has tried tennis elbow braces/straps in the past.  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  Because of this responsibility she has been unable to attend hand therapy.  Pain is 9/10 in severity.  She denies numbness or tingling.  She denies neck pain.           Past Medical History:     Type 2 diabetes on Metformin.  Most recent hemoglobin A1c was 6.7.  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    Social History     Tobacco Use     Smoking status: Never Smoker     Smokeless tobacco: Never Used   Substance Use Topics     Alcohol use: Yes     Alcohol/week: 0.0 standard drinks     Comment: occ            Family History:     Family History   Problem Relation Age of Onset     Heart Disease Mother      Diabetes Father      Diabetes Paternal Grandmother      Diabetes Paternal Grandfather             Allergies:   No Known Allergies         Medications:     Current Outpatient Medications   Medication     acetaminophen (TYLENOL) 325 MG tablet     atorvastatin (LIPITOR) 20 MG tablet     calcium-vitamin D-vitamin K (VIACTIV) 500-500-40 MG-UNT-MCG CHEW     ibuprofen (ADVIL/MOTRIN) 600 MG tablet     metFORMIN (GLUCOPHAGE) 1000 MG tablet     Omega-3 Fatty Acids (FISH OIL PO)     sertraline (ZOLOFT) 50 MG tablet     Current Facility-Administered Medications   Medication     lidocaine 1 % injection 0.5 mL     triamcinolone (KENALOG-40) injection 20 mg             Review of Systems:     A 10 point ROS was performed and reviewed. Specific responses to these questions are noted at the end of the document.         Physical Exam:   Vitals: There were no vitals taken for this visit.    Physical Exam Adult:  General: Well-nourished, alert, cooperative with exam and in no acute distress  HEENT: Atraumatic, normocephalic, extraocular movements intact, moist mucous membranes, trachea midline  Pulmonary: Unlabored work of breathing, on room air  Cardiovascular: Warm and well-perfused extremities. 2+ radial pulses  Skin: Warm, intact without rashes to the upper  extremities, head or neck   Gait: Normal  Neuro/psych: Oriented to time, place and person. Affect is appropriate    Musculoskeletal: A focused physical examination of the left elbow reveals:   Inspection- no evidence of deformity, malalignment ecchymosis or edema  Palpation-exquisitely tender to palpation over the lateral epicondyle near the common extensor tendon origin  Neurovascular- intact light touch sensation and motor to distribution of the median, ulnar and radial nerves. Brisk capillary refill to the distal fingers. 2+ radial pulse.   Range of Motion: Full and symmetric. Able to make a full fist.  Stability: no dislocation, subluxation or laxity  Muscle strength and tone: No atrophy, spasticity or flaccidness. 5/5 strength EPL, FPL, APB, first dorsal interosseous.    Special Tests:    Lateral elbow pain with resisted long finger extension.  Significant pain with resisted wrist extension.  No elbow pain with forearm pronation or supination.    Negative Spurling's, negative Lhermitte's.                        Imaging:   MRI obtained on 11/30/2020 reveals moderate common extensor tendinosis which is consistent with lateral epicondylitis               Assessment and Plan:   Assessment:  40 year old female with left lateral epicondylitis which has been refractory to nonoperative treatment since May 2019.  She continues to have ongoing pain and due to her role as the primary caregiver for her special needs son she has been unable to attend hand therapy.  She has responded favorably to corticosteroid injections in the past.     Plan:  1. I had a long discussion with the patient regarding her diagnosis and treatment thus far.  I did again ask if she might be able to attend hand therapy but this really is not a possibility for her due to her responsibility for caring for her son.  2. In addition she has tried corticosteroid injections but is seeing diminishing returns.  3. I discussed with her that we could explore  the options of a more minimally invasive surgery for tennis elbow which is called Tenex.  Or if she would prefer we can proceed with an open versus arthroscopic lateral epicondyle debridement.  4. I counseled the patient that I would discuss with my partners if someone is available to do the Tenex procedure as I believe a more minimally invasive approach will allow her to return to caring for her son sooner and she will not have to protect any type of tendon advancement/repair.  5. The patient called following the visit and stated that she does not wish to proceed with Tenex and would like to proceed with open lateral epicondyle debridement.  6. The patient was counseled on the risks, benefits and alternatives to operative treatment. Risks of surgery include: bleeding, infection, damage to surrounding structures and incomplete resolution of symptoms. No guarantees were given. The patient wishes to proceed with left, open lateral condyle-itis debridement in the operating room.  I am requesting this be done in conjunction with Dr. Santi Muniz on a Thursday in [place].  7.       Hawa Benitez MD  Department of Orthopedic Surgery  Hand Surgery              Again, thank you for allowing me to participate in the care of your patient.        Sincerely,        Hawa Benitez MD

## 2020-12-16 NOTE — PROGRESS NOTES
Hand Surgery History & Physical    REFERRING PHYSICIAN: Kulwant Zeng   PRIMARY CARE PHYSICIAN: Janine Barrett           Chief Complaint:   Pain of the Left Elbow      History of Present Illness:  Symptom Profile Including: location of symptoms, onset, severity, exacerbating/alleviating factors, previous treatments:        Sylvain Major is a 40 year old female who presents for evaluation of left elbow pain.  She states that this elbow pain began in May 2019 when she was trimming shrubs outside her house.  2 to 3 days later she experienced significant pain at the lateral aspect of her elbow.  She tried treating this with icy hot and Ben[place] but did not get significant relief.  In 2019 she received a corticosteroid injection for lateral epicondylitis and states that this gave her 2 months of relief.  She had a subsequent steroid injection which gave very short-term relief.  She is in constant pain, in particular pain is worsened with doing kickboxing exercises which she tries to do for exercise.  She has tried tennis elbow braces/straps in the past.  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  Because of this responsibility she has been unable to attend hand therapy.  Pain is 9/10 in severity.  She denies numbness or tingling.  She denies neck pain.           Past Medical History:     Type 2 diabetes on Metformin.  Most recent hemoglobin A1c was 6.7.  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    Social History     Tobacco Use     Smoking status: Never Smoker     Smokeless tobacco: Never Used   Substance Use Topics     Alcohol use: Yes     Alcohol/week: 0.0 standard drinks     Comment: occ            Family History:     Family History   Problem Relation Age of Onset     Heart Disease Mother      Diabetes Father      Diabetes Paternal Grandmother      Diabetes Paternal Grandfather             Allergies:   No Known Allergies         Medications:     Current Outpatient Medications   Medication     acetaminophen (TYLENOL) 325 MG tablet     atorvastatin (LIPITOR) 20 MG tablet     calcium-vitamin D-vitamin K (VIACTIV) 500-500-40 MG-UNT-MCG CHEW     ibuprofen (ADVIL/MOTRIN) 600 MG tablet     metFORMIN (GLUCOPHAGE) 1000 MG tablet     Omega-3 Fatty Acids (FISH OIL PO)     sertraline (ZOLOFT) 50 MG tablet     Current Facility-Administered Medications   Medication     lidocaine 1 % injection 0.5 mL     triamcinolone (KENALOG-40) injection 20 mg             Review of Systems:     A 10 point ROS was performed and reviewed. Specific responses to these questions are noted at the end of the document.         Physical Exam:   Vitals: There were no vitals taken for this visit.    Physical Exam Adult:  General: Well-nourished, alert, cooperative with exam and in no acute distress  HEENT: Atraumatic, normocephalic, extraocular movements intact, moist mucous membranes, trachea midline  Pulmonary: Unlabored work of breathing, on room air  Cardiovascular: Warm and well-perfused extremities. 2+ radial pulses  Skin: Warm, intact without rashes to the upper extremities, head or neck   Gait: Normal  Neuro/psych: Oriented to time, place and person. Affect is appropriate    Musculoskeletal: A focused physical examination of the left elbow reveals:   Inspection- no evidence of deformity, malalignment ecchymosis or  edema  Palpation-exquisitely tender to palpation over the lateral epicondyle near the common extensor tendon origin  Neurovascular- intact light touch sensation and motor to distribution of the median, ulnar and radial nerves. Brisk capillary refill to the distal fingers. 2+ radial pulse.   Range of Motion: Full and symmetric. Able to make a full fist.  Stability: no dislocation, subluxation or laxity  Muscle strength and tone: No atrophy, spasticity or flaccidness. 5/5 strength EPL, FPL, APB, first dorsal interosseous.    Special Tests:    Lateral elbow pain with resisted long finger extension.  Significant pain with resisted wrist extension.  No elbow pain with forearm pronation or supination.    Negative Spurling's, negative Lhermitte's.                        Imaging:   MRI obtained on 11/30/2020 reveals moderate common extensor tendinosis which is consistent with lateral epicondylitis               Assessment and Plan:   Assessment:  40 year old female with left lateral epicondylitis which has been refractory to nonoperative treatment since May 2019.  She continues to have ongoing pain and due to her role as the primary caregiver for her special needs son she has been unable to attend hand therapy.  She has responded favorably to corticosteroid injections in the past.     Plan:  1. I had a long discussion with the patient regarding her diagnosis and treatment thus far.  I did again ask if she might be able to attend hand therapy but this really is not a possibility for her due to her responsibility for caring for her son.  2. In addition she has tried corticosteroid injections but is seeing diminishing returns.  3. I discussed with her that we could explore the options of a more minimally invasive surgery for tennis elbow which is called Tenex.  Or if she would prefer we can proceed with an open versus arthroscopic lateral epicondyle debridement.  4. I counseled the patient that I would discuss with my partners  if someone is available to do the Tenex procedure as I believe a more minimally invasive approach will allow her to return to caring for her son sooner and she will not have to protect any type of tendon advancement/repair.  5. The patient called following the visit and stated that she does not wish to proceed with Tenex and would like to proceed with open lateral epicondyle debridement.  6. The patient was counseled on the risks, benefits and alternatives to operative treatment. Risks of surgery include: bleeding, infection, damage to surrounding structures and incomplete resolution of symptoms. No guarantees were given. The patient wishes to proceed with left, open lateral condyle-itis debridement in the operating room.  I am requesting this be done in conjunction with Dr. Santi Muniz on a Thursday in [place].  7.       Hawa Benitez MD  Department of Orthopedic Surgery  Hand Surgery

## 2020-12-16 NOTE — PATIENT INSTRUCTIONS
1. Lateral epicondylitis of left elbow      Catalino Guzman: Surgery scheduler [phone]    Call my office with any questions or concerns, [phone].

## 2020-12-17 ENCOUNTER — TELEPHONE (OUTPATIENT)
Dept: ORTHOPEDICS | Facility: CLINIC | Age: 40
End: 2020-12-17

## 2020-12-17 DIAGNOSIS — Z47.89 ORTHOPEDIC AFTERCARE: ICD-10-CM

## 2020-12-17 DIAGNOSIS — Z98.890 STATUS POST SURGERY: Primary | ICD-10-CM

## 2020-12-17 DIAGNOSIS — Z11.59 ENCOUNTER FOR SCREENING FOR OTHER VIRAL DISEASES: ICD-10-CM

## 2020-12-17 PROBLEM — M77.12 LATERAL EPICONDYLITIS OF LEFT ELBOW: Status: ACTIVE | Noted: 2020-12-17

## 2020-12-17 NOTE — TELEPHONE ENCOUNTER
Patient called and would like to move forward with surgery instead of the tenex.  Please place an order.       Catalino Guzman, Surgery Scheduler

## 2021-01-06 NOTE — TELEPHONE ENCOUNTER
Surgery cosurgeon with Dr Muniz.  Date set for 2/4/21.     Attempted to reach patient x2 this week to confirm with patient that she still wants to move forward with surgery.     Left messages both times. Awaiting callback.     Catalino Guzman, Surgery Scheduler  [phone]

## 2021-01-07 NOTE — TELEPHONE ENCOUNTER
Scheduled surgery.     ATC/Triage: please place covid order and review/sign PT order.     Type of surgery: Left elbow debridement for lateral epicondylitis  Location of surgery: [facility]  Date and time of surgery: 2/4/21 @ 0715   Surgeon: Samantha  Pre-Op Appt Date: patient will schedule  Post-Op Appt Date: 2/17/21   Packet sent out: Yes  Pre-cert/Authorization completed:  emailed zeferino and team  Date: 1/7/21    Catalino Guzman, Surgery Scheduler  [phone]

## 2021-01-20 ENCOUNTER — OFFICE VISIT (OUTPATIENT)
Dept: FAMILY MEDICINE | Facility: CLINIC | Age: 41
End: 2021-01-20
Payer: COMMERCIAL

## 2021-01-20 ENCOUNTER — TELEPHONE (OUTPATIENT)
Dept: FAMILY MEDICINE | Facility: CLINIC | Age: 41
End: 2021-01-20

## 2021-01-20 VITALS
OXYGEN SATURATION: 98 % | TEMPERATURE: 97.5 F | BODY MASS INDEX: 25.95 KG/M2 | WEIGHT: 141 LBS | HEART RATE: 108 BPM | SYSTOLIC BLOOD PRESSURE: 110 MMHG | DIASTOLIC BLOOD PRESSURE: 62 MMHG | HEIGHT: 62 IN

## 2021-01-20 DIAGNOSIS — E78.5 HYPERLIPIDEMIA LDL GOAL <70: ICD-10-CM

## 2021-01-20 DIAGNOSIS — Z01.818 PRE-OPERATIVE GENERAL PHYSICAL EXAMINATION: Primary | ICD-10-CM

## 2021-01-20 DIAGNOSIS — O24.119 TYPE 2 DIABETES MELLITUS AFFECTING PREGNANCY, ANTEPARTUM: Primary | ICD-10-CM

## 2021-01-20 DIAGNOSIS — Z23 NEED FOR PROPHYLACTIC VACCINATION AND INOCULATION AGAINST INFLUENZA: ICD-10-CM

## 2021-01-20 DIAGNOSIS — M77.12 LATERAL EPICONDYLITIS OF LEFT ELBOW: ICD-10-CM

## 2021-01-20 DIAGNOSIS — F32.1 CURRENT MODERATE EPISODE OF MAJOR DEPRESSIVE DISORDER WITHOUT PRIOR EPISODE (H): ICD-10-CM

## 2021-01-20 DIAGNOSIS — E11.9 TYPE 2 DIABETES MELLITUS WITHOUT COMPLICATION, WITHOUT LONG-TERM CURRENT USE OF INSULIN (H): ICD-10-CM

## 2021-01-20 LAB
ERYTHROCYTE [DISTWIDTH] IN BLOOD BY AUTOMATED COUNT: 13.2 % (ref 10–15)
HBA1C MFR BLD: 9.9 % (ref 0–5.6)
HCT VFR BLD AUTO: 46 % (ref 35–47)
HGB BLD-MCNC: 15.7 G/DL (ref 11.7–15.7)
MCH RBC QN AUTO: 30 PG (ref 26.5–33)
MCHC RBC AUTO-ENTMCNC: 34.1 G/DL (ref 31.5–36.5)
MCV RBC AUTO: 88 FL (ref 78–100)
PLATELET # BLD AUTO: 254 10E9/L (ref 150–450)
RBC # BLD AUTO: 5.23 10E12/L (ref 3.8–5.2)
WBC # BLD AUTO: 9.4 10E9/L (ref 4–11)

## 2021-01-20 PROCEDURE — 83036 HEMOGLOBIN GLYCOSYLATED A1C: CPT | Performed by: NURSE PRACTITIONER

## 2021-01-20 PROCEDURE — 80048 BASIC METABOLIC PNL TOTAL CA: CPT | Performed by: NURSE PRACTITIONER

## 2021-01-20 PROCEDURE — 36415 COLL VENOUS BLD VENIPUNCTURE: CPT | Performed by: NURSE PRACTITIONER

## 2021-01-20 PROCEDURE — 85027 COMPLETE CBC AUTOMATED: CPT | Performed by: NURSE PRACTITIONER

## 2021-01-20 PROCEDURE — 99214 OFFICE O/P EST MOD 30 MIN: CPT | Mod: 25 | Performed by: NURSE PRACTITIONER

## 2021-01-20 PROCEDURE — 90686 IIV4 VACC NO PRSV 0.5 ML IM: CPT | Performed by: NURSE PRACTITIONER

## 2021-01-20 PROCEDURE — 90471 IMMUNIZATION ADMIN: CPT | Performed by: NURSE PRACTITIONER

## 2021-01-20 RX ORDER — BUPROPION HYDROCHLORIDE 150 MG/1
150 TABLET ORAL EVERY MORNING
Qty: 90 TABLET | Refills: 1 | Status: SHIPPED | OUTPATIENT
Start: 2021-01-20 | End: 2021-03-19

## 2021-01-20 RX ORDER — ATORVASTATIN CALCIUM 20 MG/1
20 TABLET, FILM COATED ORAL DAILY
Qty: 90 TABLET | Refills: 1 | Status: SHIPPED | OUTPATIENT
Start: 2021-01-20 | End: 2021-03-19

## 2021-01-20 ASSESSMENT — ANXIETY QUESTIONNAIRES
3. WORRYING TOO MUCH ABOUT DIFFERENT THINGS: MORE THAN HALF THE DAYS
IF YOU CHECKED OFF ANY PROBLEMS ON THIS QUESTIONNAIRE, HOW DIFFICULT HAVE THESE PROBLEMS MADE IT FOR YOU TO DO YOUR WORK, TAKE CARE OF THINGS AT HOME, OR GET ALONG WITH OTHER PEOPLE: SOMEWHAT DIFFICULT
2. NOT BEING ABLE TO STOP OR CONTROL WORRYING: MORE THAN HALF THE DAYS
1. FEELING NERVOUS, ANXIOUS, OR ON EDGE: NEARLY EVERY DAY
6. BECOMING EASILY ANNOYED OR IRRITABLE: MORE THAN HALF THE DAYS
GAD7 TOTAL SCORE: 13
5. BEING SO RESTLESS THAT IT IS HARD TO SIT STILL: SEVERAL DAYS
7. FEELING AFRAID AS IF SOMETHING AWFUL MIGHT HAPPEN: SEVERAL DAYS

## 2021-01-20 ASSESSMENT — MIFFLIN-ST. JEOR: SCORE: 1266.79

## 2021-01-20 ASSESSMENT — PATIENT HEALTH QUESTIONNAIRE - PHQ9
SUM OF ALL RESPONSES TO PHQ QUESTIONS 1-9: 9
5. POOR APPETITE OR OVEREATING: MORE THAN HALF THE DAYS

## 2021-01-20 NOTE — PROGRESS NOTES
[facility]  [address] [address]  Phone: [phone]  Primary Provider: Janine Barrett  Pre-op Performing Provider: MARY JANE CARRASQUILLO    PREOPERATIVE EVALUATION:  Today's date: 1/20/2021    Sylvain Major is a 40 year old female who presents for a preoperative evaluation.    Surgical Information:  Surgery/Procedure: Left elbow debridement for lateral epicondylitis  Surgery Location: [facility] [place]  Surgeon: Dr. Hawa Benitez  Surgery Date: 2/4/2021  Time of Surgery: 715 am  Where patient plans to recover: At home with family  Fax number for surgical facility: Note does not need to be faxed, will be available electronically in Epic.    Type of Anesthesia Anticipated: Combined MAC with Supraclavicular Block    Subjective     HPI related to upcoming procedure: Longstanding lateral epicondylitis (left). More conservative therapies have become less effective over the past couple years, prompting her to elect to undergo open debridement.      Preop Questions 1/20/2021   1. Have you ever had a heart attack or stroke? No   2. Have you ever had surgery on your heart or blood vessels, such as a stent placement, a coronary artery bypass, or surgery on an artery in your head, neck, heart, or legs? No   3. Do you have chest pain with activity? No   4. Do you have a history of  heart failure? No   5. Do you currently have a cold, bronchitis or symptoms of other infection? No   6. Do you have a cough, shortness of breath, or wheezing? No   7. Do you or anyone in your family have previous history of blood clots? No   8. Do you or does anyone in your family have a serious bleeding problem such as prolonged bleeding following surgeries or cuts? No   9. Have you ever had problems with anemia or been told to take iron pills? No   10. Have you had any abnormal blood loss such as black, tarry or bloody stools, or abnormal  vaginal bleeding? No   11. Have you ever had a blood transfusion? No   12. Are you willing to have a blood transfusion if it is medically needed before, during, or after your surgery? Yes     13. Have you or any of your relatives ever had problems with anesthesia? No   14. Do you have sleep apnea, excessive snoring or daytime drowsiness? No   15. Do you have any artifical heart valves or other implanted medical devices like a pacemaker, defibrillator, or continuous glucose monitor? No   16. Do you have artificial joints? No   17. Are you allergic to latex? No   18. Is there any chance that you may be pregnant? No     Health Care Directive:  Patient does not have a Health Care Directive or Living Will: Discussed advance care planning with patient; however, patient declined at this time.    Preoperative Review of :   reviewed - no record of controlled substances prescribed.      Status of Chronic Conditions:  See problem list for active medical problems.  Problems all longstanding and stable, except as noted/documented.  See ROS for pertinent symptoms related to these conditions.    DM Type 2: She has type 2 diabetes for which she is prescribed metformin. She admits to probably using metformin only 3 times a week or so (2000 mg).     Depression: Not well-controlled, and she is overeating as a result. She is interesting in switching from her current regimen (sertraline) to something that my work better for both her depression and overeating symptoms.       Review of Systems  CONSTITUTIONAL: NEGATIVE for fever, chills, change in weight  INTEGUMENTARY/SKIN: NEGATIVE for worrisome rashes, moles or lesions  EYES: NEGATIVE for vision changes or irritation  ENT/MOUTH: NEGATIVE for ear, mouth and throat problems  RESP: NEGATIVE for significant cough or SOB  BREAST: NEGATIVE for masses, tenderness or discharge  CV: NEGATIVE for chest pain, palpitations or peripheral edema  GI: NEGATIVE for nausea, abdominal pain,  heartburn, or change in bowel habits  : NEGATIVE for frequency, dysuria, or hematuria  MUSCULOSKELETAL: NEGATIVE for significant arthralgias or myalgia  NEURO: NEGATIVE for weakness, dizziness or paresthesias  ENDOCRINE: NEGATIVE for temperature intolerance, skin/hair changes  HEME: NEGATIVE for bleeding problems  PSYCHIATRIC: NEGATIVE for changes in mood or affect    Patient Active Problem List    Diagnosis Date Noted     Lateral epicondylitis of left elbow 2020     Priority: Medium     Added automatically from request for surgery [id number]       Current moderate episode of major depressive disorder without prior episode (H) 2019     Priority: Medium     Retinitis pigmentosa 2019     Priority: Medium     Encounter for triage in pregnant patient 2018     Priority: Medium     S/P  section 2018     Priority: Medium     Cervical insufficiency during pregnancy in first trimester, antepartum 2018     Priority: Medium     Cervical insufficiency during pregnancy in second trimester, antepartum 2018     Priority: Medium     PCOS (polycystic ovarian syndrome) 2018     Priority: Medium     Type 2 diabetes mellitus without complication, without long-term current use of insulin (H) 10/20/2017     Priority: Medium     Hyperlipidemia LDL goal <70 10/20/2017     Priority: Medium     Atypical chest pain 10/20/2017     Priority: Medium     PROM (premature rupture of membranes) 2016     Priority: Medium     Type 2 diabetes mellitus affecting pregnancy, antepartum 10/31/2016     Priority: Medium     Dichorionic diamniotic twin gestation 10/31/2016     Priority: Medium      premature rupture of membranes (PPROM) with unknown onset of labor 10/31/2016     Priority: Medium     PPROM fetus 1 10/16/16 (19+2 wks)       Oligohydramnios antepartum, second trimester, fetus 1 10/31/2016     Priority: Medium     Insulin controlled gestational diabetes mellitus (GDM) in second  "trimester 10/25/2016     Priority: Medium      premature rupture of membranes (PPROM) delivered, current hospitalization 10/17/2016     Priority: Medium     CARDIOVASCULAR SCREENING; LDL GOAL LESS THAN 160 2013     Priority: Medium      Past Medical History:   Diagnosis Date     CARDIOVASCULAR SCREENING; LDL GOAL LESS THAN 160 2013     Past Surgical History:   Procedure Laterality Date      SECTION N/A 2018    Procedure:  SECTION - code c/s;  Surgeon: Clementine Siddiqi MD;  Location:  [facility]     Current Outpatient Medications   Medication Sig Dispense Refill     atorvastatin (LIPITOR) 20 MG tablet Take 1 tablet (20 mg) by mouth daily 90 tablet 1     buPROPion (WELLBUTRIN XL) 150 MG 24 hr tablet Take 1 tablet (150 mg) by mouth every morning 90 tablet 1     canagliflozin (INVOKANA) 100 MG tablet Take 1 tablet (100 mg) by mouth every morning (before breakfast) 90 tablet 0     metFORMIN (GLUCOPHAGE) 1000 MG tablet Take 1 tablet (1,000 mg) by mouth 2 times daily (with meals) 180 tablet 1     sertraline (ZOLOFT) 50 MG tablet TAKE 1 AND 1/2 TABLETS(75 MG) BY MOUTH DAILY 135 tablet 1     calcium-vitamin D-vitamin K (VIACTIV) 500-500-40 MG-UNT-MCG CHEW Take 1 tablet by mouth 2 times daily       Omega-3 Fatty Acids (FISH OIL PO)          No Known Allergies     Social History     Tobacco Use     Smoking status: Never Smoker     Smokeless tobacco: Never Used   Substance Use Topics     Alcohol use: Yes     Alcohol/week: 0.0 standard drinks     Comment: occ     Family History   Problem Relation Age of Onset     Heart Disease Mother      Diabetes Father      Diabetes Paternal Grandmother      Diabetes Paternal Grandfather      History   Drug Use No         Objective     /62 (BP Location: Right arm, Cuff Size: Adult Regular)   Pulse 108   Temp 97.5  F (36.4  C) (Tympanic)   Ht 1.581 m (5' 2.25\")   Wt 64 kg (141 lb)   SpO2 98%   BMI 25.58 kg/m      Physical Exam    GENERAL " APPEARANCE: healthy, alert and no distress     EYES: EOMI, PERRL     HENT: ear canals and TM's normal and nose and mouth without ulcers or lesions     NECK: no adenopathy, no asymmetry, masses, or scars and thyroid normal to palpation     RESP: lungs clear to auscultation - no rales, rhonchi or wheezes     CV: regular rates and rhythm, normal S1 S2, no S3 or S4 and no murmur, click or rub     ABDOMEN:  soft, nontender, no HSM or masses and bowel sounds normal     MS: extremities normal- no gross deformities noted, no evidence of inflammation in joints, FROM in all extremities.     SKIN: no suspicious lesions or rashes     NEURO: Normal strength and tone, sensory exam grossly normal, mentation intact and speech normal     PSYCH: mentation appears normal. and affect normal/bright     LYMPHATICS: No cervical adenopathy      Diagnostics:  Recent Results (from the past 24 hour(s))   Hemoglobin A1c    Collection Time: 01/20/21 10:37 AM   Result Value Ref Range    Hemoglobin A1C 9.9 (H) 0 - 5.6 %   CBC with platelets    Collection Time: 01/20/21 10:37 AM   Result Value Ref Range    WBC 9.4 4.0 - 11.0 10e9/L    RBC Count 5.23 (H) 3.8 - 5.2 10e12/L    Hemoglobin 15.7 11.7 - 15.7 g/dL    Hematocrit 46.0 35.0 - 47.0 %    MCV 88 78 - 100 fl    MCH 30.0 26.5 - 33.0 pg    MCHC 34.1 31.5 - 36.5 g/dL    RDW 13.2 10.0 - 15.0 %    Platelet Count 254 150 - 450 10e9/L        No EKG required, no history of coronary heart disease, significant arrhythmia, peripheral arterial disease or other structural heart disease.    Revised Cardiac Risk Index (RCRI):  The patient has the following serious cardiovascular risks for perioperative complications:   - No serious cardiac risks = 0 points     RCRI Interpretation: 0 points: Class I (very low risk - 0.4% complication rate)       Assessment & Plan   The proposed surgical procedure is considered INTERMEDIATE risk.    Sylvain was seen today for pre-op exam and imm/inj.    Diagnoses and all  orders for this visit:    Pre-operative general physical examination  -     Hemoglobin A1c  -     CBC with platelets  -     Basic metabolic panel    Lateral epicondylitis of left elbow    Hyperlipidemia LDL goal <70  Comment: Refill statin today. She will return for fasting physical within the next couple months.   -     atorvastatin (LIPITOR) 20 MG tablet; Take 1 tablet (20 mg) by mouth daily    Type 2 diabetes mellitus without complication, without long-term current use of insulin (H)  Comment: Unfortunately, her A1c was outside of acceptable range for surgical candidacy, so she will have to have her procedure postponed until this can be brought below 9% (after discussion with her surgeon). DM medication regimen escalated with plan to recheck A1c in 8 weeks to see if she is a good candidate for that procedure at that time. SGLT-2 inhibitor added and she will begin   -     metFORMIN (GLUCOPHAGE) 1000 MG tablet; Take 1 tablet (1,000 mg) by mouth 2 times daily (with meals)    Current moderate episode of major depressive disorder without prior episode (H)  Comment: Will switch from sertraline to bupropion given reports that sertraline isn't especially helpful at present, as well as the report that she is overeating quite consistently. Will follow up at her upcoming physical.   -     buPROPion (WELLBUTRIN XL) 150 MG 24 hr tablet; Take 1 tablet (150 mg) by mouth every morning    Need for prophylactic vaccination and inoculation against influenza  -     INFLUENZA VACCINE IM > 6 MONTHS VALENT IIV4 [18156]           Risks and Recommendations:  The patient has the following additional risks and recommendations for perioperative complications:   - No identified additional risk factors other than previously addressed    Medication Instructions:   - metformin: HOLD day of surgery.    RECOMMENDATION:  APPROVAL on hold to proceed with proposed procedure. Discussed this with the surgeon, and the plan is to intensify her DM  regimen and recheck her A1c in 2 months, as it will need to be below 9% before she will be allowed to proceed with her procedure.     Signed Electronically by: Ayan Campoverde NP    Copy of this evaluation report is provided to requesting physician.    [facility] Guidelines    Revised Cardiac Risk Index

## 2021-01-20 NOTE — TELEPHONE ENCOUNTER
Prior Authorization Retail Medication Request    Medication/Dose: canagliflozin (INVOKANA) 100 MG tablet  ICD code (if different than what is on RX):    Previously Tried and Failed:    Rationale:      Insurance Name:    Insurance ID:  [id number]      Pharmacy Information (if different than what is on RX)  Name:  same  Phone:  [phone]

## 2021-01-21 LAB
ANION GAP SERPL CALCULATED.3IONS-SCNC: 9 MMOL/L (ref 3–14)
BUN SERPL-MCNC: 12 MG/DL (ref 7–30)
CALCIUM SERPL-MCNC: 8.8 MG/DL (ref 8.5–10.1)
CHLORIDE SERPL-SCNC: 103 MMOL/L (ref 94–109)
CO2 SERPL-SCNC: 22 MMOL/L (ref 20–32)
CREAT SERPL-MCNC: 0.5 MG/DL (ref 0.52–1.04)
GFR SERPL CREATININE-BSD FRML MDRD: >90 ML/MIN/{1.73_M2}
GLUCOSE SERPL-MCNC: 319 MG/DL (ref 70–99)
POTASSIUM SERPL-SCNC: 4.2 MMOL/L (ref 3.4–5.3)
SODIUM SERPL-SCNC: 134 MMOL/L (ref 133–144)

## 2021-01-21 ASSESSMENT — ANXIETY QUESTIONNAIRES: GAD7 TOTAL SCORE: 13

## 2021-01-22 NOTE — TELEPHONE ENCOUNTER
PA Initiation    Medication: canagliflozin (INVOKANA) 100 MG tablet   Insurance Company: [employer] ([facility]) - Phone [phone] Fax [phone]  Pharmacy Filling the Rx: [id number] - [place], [place] - [address]  Filling Pharmacy Phone: [phone]  Filling Pharmacy Fax: [phone]  Start Date: 1/22/2021

## 2021-01-23 NOTE — TELEPHONE ENCOUNTER
PRIOR AUTHORIZATION DENIED    Medication: canagliflozin (INVOKANA) 100 MG tablet--DENIED    Denial Date: 1/22/2021    Denial Rational: Patient needs to try and fail Jardiance.     Appeal Information:

## 2021-01-25 ENCOUNTER — TELEPHONE (OUTPATIENT)
Dept: ORTHOPEDICS | Facility: CLINIC | Age: 41
End: 2021-01-25

## 2021-01-25 NOTE — TELEPHONE ENCOUNTER
Attempted to reach patient to notify her that surgery is cancelled.  Patient's A1C is too high.     Left message last week and again today.  Have not heard from patient.  I did cancel surgery on 2/4/21.       Catalino Guzman, Surgery Scheduler

## 2021-01-26 NOTE — TELEPHONE ENCOUNTER
Patient wanting to know if Jardiance causes weight?  Patient also asking if using a [facility], which is much cheaper, is a good idea?  Kaylynn Victoria RN

## 2021-01-26 NOTE — TELEPHONE ENCOUNTER
Please let Julia know that her formulary must have switched and she needs to try Jardiance instead of Invokana. Jardiance is a very good medication and her sugars should respond in the same way as they have to Invokana. If she is agreeable to this I will send in a new prescription.

## 2021-01-26 NOTE — TELEPHONE ENCOUNTER
Patient calling back after doing research on Jardiance. She is willing to try Jardiance for the next 3-4 months. Patient scheduled in clinic visit for diabetes and hair loss on 3/16/21 with Dr. Barrett. Kaylynn Victoria RN

## 2021-01-27 NOTE — TELEPHONE ENCOUNTER
Detailed message left with info from provider and return number to call with any questions or concerns.    Sabrina WARE RN  EP Triage

## 2021-01-27 NOTE — TELEPHONE ENCOUNTER
Left vmail for patient to return call.  Cancelled surgery for 2/4/21 and all associated clinic appointments.     Catalino

## 2021-01-29 NOTE — TELEPHONE ENCOUNTER
Patient finally called back.  I explained that her A1C was too high.  Patient thanked me for the information and stated that she will get a second opinion.        Catalino Guzman, Surgery Scheduler

## 2021-03-19 ENCOUNTER — OFFICE VISIT (OUTPATIENT)
Dept: FAMILY MEDICINE | Facility: CLINIC | Age: 41
End: 2021-03-19
Payer: COMMERCIAL

## 2021-03-19 VITALS
BODY MASS INDEX: 24.98 KG/M2 | RESPIRATION RATE: 14 BRPM | OXYGEN SATURATION: 100 % | HEIGHT: 63 IN | TEMPERATURE: 96.6 F | DIASTOLIC BLOOD PRESSURE: 62 MMHG | WEIGHT: 141 LBS | SYSTOLIC BLOOD PRESSURE: 120 MMHG | HEART RATE: 104 BPM

## 2021-03-19 DIAGNOSIS — M79.622 PAIN OF LEFT UPPER ARM: ICD-10-CM

## 2021-03-19 DIAGNOSIS — M77.12 LATERAL EPICONDYLITIS OF LEFT ELBOW: ICD-10-CM

## 2021-03-19 DIAGNOSIS — F32.1 CURRENT MODERATE EPISODE OF MAJOR DEPRESSIVE DISORDER WITHOUT PRIOR EPISODE (H): ICD-10-CM

## 2021-03-19 DIAGNOSIS — E78.5 HYPERLIPIDEMIA LDL GOAL <70: ICD-10-CM

## 2021-03-19 DIAGNOSIS — E11.9 TYPE 2 DIABETES MELLITUS WITHOUT COMPLICATION, WITHOUT LONG-TERM CURRENT USE OF INSULIN (H): Primary | ICD-10-CM

## 2021-03-19 LAB — HBA1C MFR BLD: 6.9 % (ref 0–5.6)

## 2021-03-19 PROCEDURE — 99214 OFFICE O/P EST MOD 30 MIN: CPT | Performed by: NURSE PRACTITIONER

## 2021-03-19 PROCEDURE — 36415 COLL VENOUS BLD VENIPUNCTURE: CPT | Performed by: NURSE PRACTITIONER

## 2021-03-19 PROCEDURE — 83036 HEMOGLOBIN GLYCOSYLATED A1C: CPT | Performed by: NURSE PRACTITIONER

## 2021-03-19 RX ORDER — BUPROPION HYDROCHLORIDE 150 MG/1
150 TABLET ORAL EVERY MORNING
Qty: 90 TABLET | Refills: 1 | Status: SHIPPED | OUTPATIENT
Start: 2021-03-19

## 2021-03-19 RX ORDER — ATORVASTATIN CALCIUM 20 MG/1
20 TABLET, FILM COATED ORAL DAILY
Qty: 90 TABLET | Refills: 1 | Status: SHIPPED | OUTPATIENT
Start: 2021-03-19 | End: 2021-11-24

## 2021-03-19 ASSESSMENT — ANXIETY QUESTIONNAIRES
2. NOT BEING ABLE TO STOP OR CONTROL WORRYING: NEARLY EVERY DAY
5. BEING SO RESTLESS THAT IT IS HARD TO SIT STILL: SEVERAL DAYS
IF YOU CHECKED OFF ANY PROBLEMS ON THIS QUESTIONNAIRE, HOW DIFFICULT HAVE THESE PROBLEMS MADE IT FOR YOU TO DO YOUR WORK, TAKE CARE OF THINGS AT HOME, OR GET ALONG WITH OTHER PEOPLE: VERY DIFFICULT
1. FEELING NERVOUS, ANXIOUS, OR ON EDGE: NEARLY EVERY DAY
7. FEELING AFRAID AS IF SOMETHING AWFUL MIGHT HAPPEN: MORE THAN HALF THE DAYS
3. WORRYING TOO MUCH ABOUT DIFFERENT THINGS: MORE THAN HALF THE DAYS
GAD7 TOTAL SCORE: 16
6. BECOMING EASILY ANNOYED OR IRRITABLE: NEARLY EVERY DAY

## 2021-03-19 ASSESSMENT — PATIENT HEALTH QUESTIONNAIRE - PHQ9
SUM OF ALL RESPONSES TO PHQ QUESTIONS 1-9: 17
5. POOR APPETITE OR OVEREATING: MORE THAN HALF THE DAYS

## 2021-03-19 ASSESSMENT — MIFFLIN-ST. JEOR: SCORE: 1270.76

## 2021-03-19 NOTE — PROGRESS NOTES
"    Assessment & Plan     Type 2 diabetes mellitus without complication, without long-term current use of insulin (H)  Comment: A1c has dropped to 6.9% today! Congratulated her on her efforts. Will reorder this same regimen and recheck A1c and lipids in 3 months.   - **A1C FUTURE 1yr  - empagliflozin (JARDIANCE) 10 MG TABS tablet  Dispense: 90 tablet; Refill: 1  - metFORMIN (GLUCOPHAGE) 1000 MG tablet  Dispense: 180 tablet; Refill: 1    Lateral epicondylitis of left elbow  Comment: Surgeon has left our health system, so she is requesting referral to [facility].   - [facility]  Referral    Pain of left upper arm  Comment: Surgeon has left our health system, so she is requesting referral to [facility].   - [facility]  Referral    Hyperlipidemia LDL goal <70  - atorvastatin (LIPITOR) 20 MG tablet  Dispense: 90 tablet; Refill: 1    Current moderate episode of major depressive disorder without prior episode (H)  Comment: When questioned about PHQ, she denies suicidality. She is struggling but states she knows why. She would like to continue on Wellbutrin, but she self-stopped Zoloft in recent months. Declines further changes / additions to her regimen, and declines referral to therapy at this point. Follow closely.   - buPROPion (WELLBUTRIN XL) 150 MG 24 hr tablet  Dispense: 90 tablet; Refill: 1        BMI:   Estimated body mass index is 25.38 kg/m  as calculated from the following:    Height as of this encounter: 1.588 m (5' 2.5\").    Weight as of this encounter: 64 kg (141 lb).       Depression Screening Follow Up    PHQ 3/19/2021   PHQ-9 Total Score 17   Q9: Thoughts of better off dead/self-harm past 2 weeks Several days       Follow Up      Follow Up Actions Taken  Patient declined referral.    Discussed the following ways the patient can remain in a safe environment:  be around others  See Patient Instructions    Return in about 3 months (around 6/19/2021) for Routine Visit, Lab Work.    Ayan" ISAC Campoverde NP  [facility] DILIA Dumont is a 40 year old who presents for the following health issues       Diabetes Follow-up      How often are you checking your blood sugar? Not at all    What concerns do you have today about your diabetes? None and Other: concerned  About getting her surgery done and is concerned  About the blood sugar readings     Do you have any of these symptoms? No symptoms she  Feels that she should be worried about    Have you had a diabetic eye exam in the last 12 months? No        BP Readings from Last 2 Encounters:   03/19/21 120/62   01/20/21 110/62     Hemoglobin A1C (%)   Date Value   03/19/2021 6.9 (H)   01/20/2021 9.9 (H)     LDL Cholesterol Calculated (mg/dL)   Date Value   09/10/2019 170 (H)   12/15/2017 38         How many servings of fruits and vegetables do you eat daily?  2-3    On average, how many sweetened beverages do you drink each day (Examples: soda, juice, sweet tea, etc.  Do NOT count diet or artificially sweetened beverages)?   0    How many days per week do you exercise enough to make your heart beat faster? 6    How many minutes a day do you exercise enough to make your heart beat faster? 30 - 60    How many days per week do you miss taking your medication? 0    HPI: Julia presents today for diabetes follow up. She was seen here in January for a preoperative exam (procedure planned for lateral epicondylitis), but her A1c was found to be 9.9%, so her procedure was postponed. She hadn't been taking her metformin consistently and hadn't been working in diet or exercise measures to control her diabetes at that time. She was restarted on metformin and Jardiance was added after that visit (with the plan to recheck things in 2-3 months, so she could go ahead with her surgery). Unfortunately, the surgeon who was supposed to operate on her has left the health system. So, she is asking for referral to [facility] today.      Has been walking /  "working out daily.     Eating healthy.    Taking 2000 mg daily metformin.    Taking Jardiance 10 mg daily.     No side effects.     Review of Systems   Constitutional, cardiovascular, musculoskeletal, neuro, endocrine systems are negative, except as otherwise noted.      Objective    /62 (BP Location: Left arm, Patient Position: Sitting, Cuff Size: Adult Regular)   Pulse 104   Temp 96.6  F (35.9  C) (Tympanic)   Resp 14   Ht 1.588 m (5' 2.5\")   Wt 64 kg (141 lb)   SpO2 100%   BMI 25.38 kg/m    Body mass index is 25.38 kg/m .  Physical Exam   GENERAL: healthy, alert and no distress  RESP: lungs clear to auscultation - no rales, rhonchi or wheezes  CV: regular rate and rhythm, normal S1 S2, no S3 or S4, no murmur, click or rub, no peripheral edema and peripheral pulses strong  NEURO: Normal strength and tone, mentation intact and speech normal  PSYCH: mentation appears normal, affect normal/bright    Results for orders placed or performed in visit on 03/19/21 (from the past 24 hour(s))   **A1C FUTURE 1yr   Result Value Ref Range    Hemoglobin A1C 6.9 (H) 0 - 5.6 %               "

## 2021-03-20 ASSESSMENT — ANXIETY QUESTIONNAIRES: GAD7 TOTAL SCORE: 16

## 2021-03-29 ENCOUNTER — TRANSFERRED RECORDS (OUTPATIENT)
Dept: HEALTH INFORMATION MANAGEMENT | Facility: CLINIC | Age: 41
End: 2021-03-29

## 2021-03-31 ENCOUNTER — TRANSFERRED RECORDS (OUTPATIENT)
Dept: HEALTH INFORMATION MANAGEMENT | Facility: CLINIC | Age: 41
End: 2021-03-31

## 2021-04-10 ENCOUNTER — TRANSFERRED RECORDS (OUTPATIENT)
Dept: HEALTH INFORMATION MANAGEMENT | Facility: CLINIC | Age: 41
End: 2021-04-10

## 2021-04-13 ENCOUNTER — TELEPHONE (OUTPATIENT)
Dept: FAMILY MEDICINE | Facility: CLINIC | Age: 41
End: 2021-04-13

## 2021-04-13 ENCOUNTER — OFFICE VISIT (OUTPATIENT)
Dept: FAMILY MEDICINE | Facility: CLINIC | Age: 41
End: 2021-04-13
Payer: COMMERCIAL

## 2021-04-13 VITALS
DIASTOLIC BLOOD PRESSURE: 70 MMHG | HEART RATE: 107 BPM | RESPIRATION RATE: 16 BRPM | OXYGEN SATURATION: 99 % | SYSTOLIC BLOOD PRESSURE: 100 MMHG | WEIGHT: 139 LBS | TEMPERATURE: 97.1 F | BODY MASS INDEX: 25.02 KG/M2

## 2021-04-13 DIAGNOSIS — Z01.818 PREOP GENERAL PHYSICAL EXAM: Primary | ICD-10-CM

## 2021-04-13 DIAGNOSIS — M77.12 LATERAL EPICONDYLITIS OF LEFT ELBOW: ICD-10-CM

## 2021-04-13 PROCEDURE — 99214 OFFICE O/P EST MOD 30 MIN: CPT | Performed by: NURSE PRACTITIONER

## 2021-04-13 NOTE — PROGRESS NOTES
[address] [address]  Phone: [phone]  Primary Provider: Janine Barrett  Pre-op Performing Provider: MARY JANE CARRASQUILLO      PREOPERATIVE EVALUATION:  Today's date: 4/13/2021    Sylvain Major is a 40 year old female who presents for a preoperative evaluation.    Surgical Information:  Surgery/Procedure: Left Elbow debridement  Surgery Location: [facility]  Surgeon: Dr. Barrientos  Surgery Date: 4/20/2021  Time of Surgery:   Where patient plans to recover: At home with family  Fax number for surgical facility: Will call us with fax number on 4/14    Type of Anesthesia Anticipated: to be determined    Assessment & Plan     The proposed surgical procedure is considered INTERMEDIATE risk.    Preop general physical exam      Lateral epicondylitis of left elbow         Risks and Recommendations:  The patient has the following additional risks and recommendations for perioperative complications:   - No identified additional risk factors other than previously addressed    Medication Instructions:   - metformin: HOLD day of surgery.   - SGLT2 Inhibitor (canagliflozin, dapagliflozin, or empagliflozin): HOLD 3 days before surgery.     RECOMMENDATION:  APPROVAL GIVEN to proceed with proposed procedure, without further diagnostic evaluation.      Subjective     HPI related to upcoming procedure: Longstanding lateral epicondylitis (left). More conservative therapies have become less effective over the past couple years, prompting her to elect to undergo open debridement.    Preop Questions 4/13/2021   1. Have you ever had a heart attack or stroke? No   2. Have you ever had surgery on your heart or blood vessels, such as a stent placement, a coronary artery bypass, or surgery on an artery in your head, neck, heart, or legs? No   3. Do you have chest pain with activity? No   4. Do you have a history of  heart failure? No   5. Do you currently have a  cold, bronchitis or symptoms of other infection? No   6. Do you have a cough, shortness of breath, or wheezing? No   7. Do you or anyone in your family have previous history of blood clots? No   8. Do you or does anyone in your family have a serious bleeding problem such as prolonged bleeding following surgeries or cuts? No   9. Have you ever had problems with anemia or been told to take iron pills? No   10. Have you had any abnormal blood loss such as black, tarry or bloody stools, or abnormal vaginal bleeding? No   11. Have you ever had a blood transfusion? No   12. Are you willing to have a blood transfusion if it is medically needed before, during, or after your surgery? Yes   13. Have you or any of your relatives ever had problems with anesthesia? No   14. Do you have sleep apnea, excessive snoring or daytime drowsiness? No   15. Do you have any artifical heart valves or other implanted medical devices like a pacemaker, defibrillator, or continuous glucose monitor? No   16. Do you have artificial joints? No   17. Are you allergic to latex? No   18. Is there any chance that you may be pregnant? No       Health Care Directive:  Patient does not have a Health Care Directive or Living Will: Discussed advance care planning with patient; information given to patient to review.    Preoperative Review of :   reviewed - no record of controlled substances prescribed.      Status of Chronic Conditions:  See problem list for active medical problems.  Problems all longstanding and stable, except as noted/documented.  See ROS for pertinent symptoms related to these conditions.      Review of Systems  CONSTITUTIONAL: NEGATIVE for fever, chills, change in weight  INTEGUMENTARY/SKIN: NEGATIVE for worrisome rashes, moles or lesions  EYES: NEGATIVE for vision changes or irritation  ENT/MOUTH: NEGATIVE for ear, mouth and throat problems  RESP: NEGATIVE for significant cough or SOB  BREAST: NEGATIVE for masses, tenderness or  discharge  CV: NEGATIVE for chest pain, palpitations or peripheral edema  GI: NEGATIVE for nausea, abdominal pain, heartburn, or change in bowel habits  : NEGATIVE for frequency, dysuria, or hematuria  MUSCULOSKELETAL: NEGATIVE for significant arthralgias or myalgia  NEURO: NEGATIVE for weakness, dizziness or paresthesias  ENDOCRINE: NEGATIVE for temperature intolerance, skin/hair changes  HEME: NEGATIVE for bleeding problems  PSYCHIATRIC: NEGATIVE for changes in mood or affect    Patient Active Problem List    Diagnosis Date Noted     Lateral epicondylitis of left elbow 2020     Priority: Medium     Added automatically from request for surgery [id number]       Current moderate episode of major depressive disorder without prior episode (H) 2019     Priority: Medium     Retinitis pigmentosa 2019     Priority: Medium     Encounter for triage in pregnant patient 2018     Priority: Medium     S/P  section 2018     Priority: Medium     Cervical insufficiency during pregnancy in first trimester, antepartum 2018     Priority: Medium     Cervical insufficiency during pregnancy in second trimester, antepartum 2018     Priority: Medium     PCOS (polycystic ovarian syndrome) 2018     Priority: Medium     Type 2 diabetes mellitus without complication, without long-term current use of insulin (H) 10/20/2017     Priority: Medium     Hyperlipidemia LDL goal <70 10/20/2017     Priority: Medium     Atypical chest pain 10/20/2017     Priority: Medium     PROM (premature rupture of membranes) 2016     Priority: Medium     Type 2 diabetes mellitus affecting pregnancy, antepartum 10/31/2016     Priority: Medium     Dichorionic diamniotic twin gestation 10/31/2016     Priority: Medium      premature rupture of membranes (PPROM) with unknown onset of labor 10/31/2016     Priority: Medium     PPROM fetus 1 10/16/16 (19+2 wks)       Oligohydramnios antepartum, second  trimester, fetus 1 10/31/2016     Priority: Medium     Insulin controlled gestational diabetes mellitus (GDM) in second trimester 10/25/2016     Priority: Medium      premature rupture of membranes (PPROM) delivered, current hospitalization 10/17/2016     Priority: Medium     CARDIOVASCULAR SCREENING; LDL GOAL LESS THAN 160 2013     Priority: Medium      Past Medical History:   Diagnosis Date     CARDIOVASCULAR SCREENING; LDL GOAL LESS THAN 160 2013     Past Surgical History:   Procedure Laterality Date      SECTION N/A 2018    Procedure:  SECTION - code c/s;  Surgeon: Clementine Siddiqi MD;  Location:  [facility]     Current Outpatient Medications   Medication Sig Dispense Refill     atorvastatin (LIPITOR) 20 MG tablet Take 1 tablet (20 mg) by mouth daily 90 tablet 1     buPROPion (WELLBUTRIN XL) 150 MG 24 hr tablet Take 1 tablet (150 mg) by mouth every morning 90 tablet 1     calcium-vitamin D-vitamin K (VIACTIV) 500-500-40 MG-UNT-MCG CHEW Take 1 tablet by mouth 2 times daily       empagliflozin (JARDIANCE) 10 MG TABS tablet Take 1 tablet (10 mg) by mouth daily 90 tablet 1     metFORMIN (GLUCOPHAGE) 1000 MG tablet Take 1 tablet (1,000 mg) by mouth 2 times daily (with meals) 180 tablet 1     Omega-3 Fatty Acids (FISH OIL PO)        UNABLE TO FIND MEDICATION NAME: Garlic Pill per patient         No Known Allergies     Social History     Tobacco Use     Smoking status: Never Smoker     Smokeless tobacco: Never Used   Substance Use Topics     Alcohol use: Yes     Alcohol/week: 0.0 standard drinks     Comment: occ     Family History   Problem Relation Age of Onset     Heart Disease Mother      Diabetes Father      Diabetes Paternal Grandmother      Diabetes Paternal Grandfather      History   Drug Use No         Objective     /70   Pulse 107   Temp 97.1  F (36.2  C)   Resp 16   Wt 63 kg (139 lb)   SpO2 99%   BMI 25.02 kg/m      Physical Exam    GENERAL APPEARANCE:  healthy, alert and no distress     EYES: EOMI, PERRL     HENT: ear canals and TM's normal and nose and mouth without ulcers or lesions     NECK: no adenopathy, no asymmetry, masses, or scars and thyroid normal to palpation     RESP: lungs clear to auscultation - no rales, rhonchi or wheezes     CV: regular rates and rhythm, normal S1 S2, no S3 or S4 and no murmur, click or rub     ABDOMEN:  soft, nontender, no HSM or masses and bowel sounds normal     MS: extremities normal- no gross deformities noted, no evidence of inflammation in joints, FROM in all extremities.     SKIN: no suspicious lesions or rashes     NEURO: Normal strength and tone, sensory exam grossly normal, mentation intact and speech normal     PSYCH: mentation appears normal. and affect normal/bright     LYMPHATICS: No cervical adenopathy    Recent Labs   Lab Test 03/19/21  1145 01/20/21  1037 09/10/19  1047 09/10/19  1047   HGB  --  15.7  --   --    PLT  --  254  --   --    NA  --  134  --  135   POTASSIUM  --  4.2  --  4.2   CR  --  0.50*  --  0.38*   A1C 6.9* 9.9*   < > 6.8*    < > = values in this interval not displayed.        Diagnostics:    No labs were ordered during this visit. See above for recent labs. A1c 6.9% on 3/19/21.    No EKG required, no history of coronary heart disease, significant arrhythmia, peripheral arterial disease or other structural heart disease.    Revised Cardiac Risk Index (RCRI):  The patient has the following serious cardiovascular risks for perioperative complications:   - No serious cardiac risks = 0 points     RCRI Interpretation: 0 points: Class I (very low risk - 0.4% complication rate)           Signed Electronically by: Ayan Campoverde NP  Copy of this evaluation report is provided to requesting physician.

## 2021-04-13 NOTE — PATIENT INSTRUCTIONS

## 2021-04-13 NOTE — TELEPHONE ENCOUNTER
Will be calling on 4/14 to provide fax number of surgical facility. Please enter this into note from 4/13 and fax to intended recipient.     Thanks,    Jeromy

## 2021-04-18 ENCOUNTER — HEALTH MAINTENANCE LETTER (OUTPATIENT)
Age: 41
End: 2021-04-18

## 2021-05-10 ENCOUNTER — TRANSFERRED RECORDS (OUTPATIENT)
Dept: HEALTH INFORMATION MANAGEMENT | Facility: CLINIC | Age: 41
End: 2021-05-10

## 2021-07-09 ENCOUNTER — TRANSFERRED RECORDS (OUTPATIENT)
Dept: HEALTH INFORMATION MANAGEMENT | Facility: CLINIC | Age: 41
End: 2021-07-09

## 2021-07-14 ENCOUNTER — TRANSFERRED RECORDS (OUTPATIENT)
Dept: HEALTH INFORMATION MANAGEMENT | Facility: CLINIC | Age: 41
End: 2021-07-14

## 2021-07-18 ENCOUNTER — ANCILLARY PROCEDURE (OUTPATIENT)
Dept: GENERAL RADIOLOGY | Facility: CLINIC | Age: 41
End: 2021-07-18
Attending: PHYSICIAN ASSISTANT
Payer: COMMERCIAL

## 2021-07-18 ENCOUNTER — OFFICE VISIT (OUTPATIENT)
Dept: URGENT CARE | Facility: URGENT CARE | Age: 41
End: 2021-07-18
Payer: COMMERCIAL

## 2021-07-18 VITALS
HEART RATE: 97 BPM | DIASTOLIC BLOOD PRESSURE: 77 MMHG | RESPIRATION RATE: 18 BRPM | TEMPERATURE: 99.3 F | OXYGEN SATURATION: 98 % | SYSTOLIC BLOOD PRESSURE: 115 MMHG

## 2021-07-18 DIAGNOSIS — Z20.822 SUSPECTED COVID-19 VIRUS INFECTION: ICD-10-CM

## 2021-07-18 DIAGNOSIS — R50.9 FEVER AND CHILLS: ICD-10-CM

## 2021-07-18 DIAGNOSIS — J01.90 ACUTE SINUSITIS, RECURRENCE NOT SPECIFIED, UNSPECIFIED LOCATION: Primary | ICD-10-CM

## 2021-07-18 PROCEDURE — U0003 INFECTIOUS AGENT DETECTION BY NUCLEIC ACID (DNA OR RNA); SEVERE ACUTE RESPIRATORY SYNDROME CORONAVIRUS 2 (SARS-COV-2) (CORONAVIRUS DISEASE [COVID-19]), AMPLIFIED PROBE TECHNIQUE, MAKING USE OF HIGH THROUGHPUT TECHNOLOGIES AS DESCRIBED BY CMS-2020-01-R: HCPCS | Performed by: PHYSICIAN ASSISTANT

## 2021-07-18 PROCEDURE — 99214 OFFICE O/P EST MOD 30 MIN: CPT | Performed by: PHYSICIAN ASSISTANT

## 2021-07-18 PROCEDURE — U0005 INFEC AGEN DETEC AMPLI PROBE: HCPCS | Performed by: PHYSICIAN ASSISTANT

## 2021-07-18 PROCEDURE — 71046 X-RAY EXAM CHEST 2 VIEWS: CPT | Performed by: RADIOLOGY

## 2021-07-18 RX ORDER — AZITHROMYCIN 250 MG/1
TABLET, FILM COATED ORAL
Qty: 6 TABLET | Refills: 0 | Status: SHIPPED | OUTPATIENT
Start: 2021-07-18 | End: 2021-09-27

## 2021-07-18 NOTE — PATIENT INSTRUCTIONS
"  (J01.90) Acute sinusitis, recurrence not specified, unspecified location  (primary encounter diagnosis)  Comment:   Plan: azithromycin (ZITHROMAX Z-PARTH) 250 MG tablet        Continue Flonase.  Add saline nasal spray    (Z20.822) Suspected COVID-19 virus infection  Comment:   Plan: Asymptomatic COVID-19 Virus (Coronavirus) by         PCR Nasopharyngeal            (R50.9) Fever and chills  Comment:   Plan: XR Chest 2 Views            Follow covid isolation guidelines.        Patient Education   After Your COVID-19 (Coronavirus) Test  You have been tested for COVID-19 (coronavirus).   If you'll have surgery in the next few days, we'll let you know ahead of time if you have the virus. Please call your surgeon's office with any questions.  For all other patients: Results are usually available in [employer] within 2 to 3 days.   If you do not have a [employer] account, you'll get a letter in the mail in about 7 to 10 days.   [employer] is often the fastest way to get test results. Please sign up if you do not already have a [employer] account. See the handout Getting COVID-19 Test Results in [employer] for help.  What if my test result is positive?  If your test is positive and you have not viewed your result in [employer], you'll get a phone call with your result. (A positive test means that you have the virus.)     Follow the tips under \"How do I self-isolate?\" below for 10 days (20 days if you have a weak immune system).    You don't need to be retested for COVID-19 before going back to school or work. As long as you're fever-free and feeling better, you can go back to school, work and other activities after waiting the 10 or 20 days.  What if I have questions after I get my results?  If you have questions about your results, please visit our testing website at [email or web address].   After 7 to 10 days, if you have not gotten your results:     Call [phone] ([phone]) and ask to speak with " "our COVID-19 results team.    If you're being treated at an infusion center: Call your infusion center directly.  What are the symptoms of COVID-19?  Cough, fever and trouble breathing are the most common signs of COVID-19.  Other symptoms can include new headaches, new muscle or body aches, new and unexplained fatigue (feeling very tired), chills, sore throat, congestion (stuffy or runny nose), diarrhea (loose poop), loss of taste or smell, belly pain, and nausea or vomiting (feeling sick to your stomach or throwing up).  You may already have symptoms of COVID-19, or they may show up later.  What should I do if I have symptoms?  If you're having surgery: Call your surgeon's office.  For all other patients: Stay home and away from others (self-isolate) until ...    You've had no fever--and no medicine that reduces fever--for 1 full day (24 hours), AND    Other symptoms have gotten better. For example, your cough or breathing has improved, AND    At least 10 days have passed since your symptoms first started.  How do I self-isolate?    Stay in your own room, even for meals. Use your own bathroom if you can.    Stay away from others in your home. No hugging, kissing or shaking hands. No visitors.    Don't go to work, school or anywhere else.    Clean \"high touch\" surfaces often (doorknobs, counters, handles). Use household cleaning spray or wipes. You'll find a full list of  on the EPA website: www.epa.gov/pesticide-registration/list-n-disinfectants-use-against-sars-cov-2.    Cover your mouth and nose with a mask or other face covering to avoid spreading germs.    Wash your hands and face often. Use soap and water.    Caregivers in these groups are at risk for severe illness due to COVID-19:  ? People 65 years and older  ? People who live in a nursing home or long-term care facility  ? People with chronic disease (lung, heart, cancer, diabetes, kidney, liver, immunologic)  ? People who have a weakened immune " system, including those who:    Are in cancer treatment    Take medicine that weakens the immune system, such as corticosteroids    Had a bone marrow or organ transplant    Have an immune deficiency    Have poorly controlled HIV or AIDS    Are obese (body mass index of 40 or higher)    Smoke regularly    Caregivers should wear gloves while washing dishes, handling laundry and cleaning bedrooms and bathrooms.    Use caution when washing and drying laundry: Don't shake dirty laundry and use the warmest water setting that you can.    For more tips on managing your health at home, go to www.cdc.gov/coronavirus/2019-ncov/downloads/10Things.pdf.  How can I take care of myself at home?  1. Get lots of rest. Drink extra fluids (unless a doctor has told you not to).  2. Take Tylenol (acetaminophen) for fever or pain. If you have liver or kidney problems, ask your family doctor if it's OK to take Tylenol.   Adults can take either:  ? 650 mg (two 325 mg pills) every 4 to 6 hours, or   ? 1,000 mg (two 500 mg pills) every 8 hours as needed.  ? Note: Don't take more than 3,000 mg in one day. Acetaminophen is found in many medicines (both prescribed and over-the-counter medicines). Read all labels to be sure you don't take too much.   For children, check the Tylenol bottle for the right dose. The dose is based on the child's age or weight.  3. If you have other health problems (like cancer, heart failure, an organ transplant or severe kidney disease): Call your specialty clinic if you don't feel better in the next 2 days.  4. Know when to call 911. Emergency warning signs include:  ? Trouble breathing or shortness of breath  ? Chest pain or pressure that doesn't go away  ? Feeling confused like you haven't felt before, or not being able to wake up  ? Bluish-colored lips or face  5. If your doctor prescribed a blood thinner medicine: Follow their instructions.  Where can I get more information?    [facility] - About  COVID-19:   [email or web address]    CDC - If You're Sick: cdc.gov/coronavirus/2019-ncov/about/steps-when-sick.html    CDC - Ending Home Isolation: www.cdc.gov/coronavirus/2019-ncov/hcp/disposition-in-home-patients.html    CDC - Caring for Someone: www.cdc.gov/coronavirus/2019-ncov/if-you-are-sick/care-for-someone.html    [facility] - Interim Guidance for Hospital Discharge to Home: www.[email or web address]./[email or web address]/[email or web address]/hcp/hospdischarge.pdf    [facility] clinical trials (COVID-19 research studies): [email or web address]-clinical-trials    Below are the COVID-19 hotlines at the [place] Department of Health ([facility]). Interpreters are available.  ? For health questions: Call 198-481-9025 or 1-298.696.8694 (7 a.m. to 7 p.m.)  ? For questions about schools and childcare: Call 953-980-3108 or 1-489.503.9886 (7 a.m. to 7 p.m.)    For informational purposes only. Not to replace the advice of your health care provider. Clinically reviewed by Infection Prevention and the [employer] COVID-19 Clinical Team. Copyright   2020 [facility]. All rights reserved. [employer] 350566 - Rev 11/11/20.       Patient Education     Understanding Acute Rhinosinusitis  Acute rhinosinusitis is when the lining of the inside of the nose and the sinuses becomes irritated and swollen. It is also called sinusitis, or a sinus infection.  Sinuses are air-filled spaces in the skull behind the face. They are kept moist and clean by a lining of mucosa. Things such as pollen, smoke, and chemical fumes can irritate the mucosa. It can then swell up. As a response to irritation, the mucosa makes more mucus and other fluids. Tiny hairlike cilia cover the mucosa. Cilia help carry mucus toward the opening of the sinus. Too much mucus may cause the cilia to stop working. This blocks the sinus opening. A buildup of fluid in the sinuses then causes pain and pressure. It can also cause bacteria to grow in the  sinuses.    What causes acute rhinosinusitis?  A sinus infection is most often caused by a virus. You are more likely to get one after having a cold or the flu. In some cases, a sinus infection can be caused by bacteria.  You are at higher risk for a sinus infection if you:    Are older in age    Have structural problems with your sinuses    Smoke or are exposed to secondhand smoke    Are exposed to changes in pressure, such as from flying a lot or deep sea diving    Have asthma or allergies    Have a weak immune system    Have dental disease     Symptoms of acute rhinosinusitis  Symptoms of acute rhinosinusitis often last around 7 to 10 days. If you have a bacterial infection, they may last longer. They may also get better but then worsen. You may have:    Face pain or pressure under the eyes and around the nose    Headache    Fluid draining in the back of the throat (postnasal drip)    Congestion    Drainage that is thick and colored (often green), instead of clear    Cough    Problems with your sense of smell    Ear pain or hearing problems    Fever    Tooth pain    Fatigue  Diagnosing acute rhinosinusitis  Your healthcare provider will ask about your symptoms and past health. He or she will look at your ears, nose, throat, and sinuses. Imaging tests, such as X-rays, are often not needed.  It can be hard to figure out if a sinus infection is caused by a virus or bacterium. A bacterial infection tends to last longer. Symptoms may also get better but then worsen. Your healthcare provider may take a sample of mucus from your nose to check for bacteria.  Treating acute rhinosinusitis  Most sinus infections will go away within 10 days. Your body will fight off the virus. If your symptoms seem to get better but then worsen, you may have a bacterial infection instead. Your healthcare provider will then give you antibiotics. Take this medicine until it is gone, even if you feel better.  To help ease your symptoms, your  healthcare provider may advise:    Over-the-counter pain relievers. Medicines such as acetaminophen or ibuprofen can ease sinus pain. They may also lower a fever.    Nasal washes. Washing your nasal passages with salt water may ease pain and pressure. It can rinse out mucous and other irritants from your sinuses. Your healthcare provider can show you how to do it.    Nasal steroid spray. This prescription medicine can reduce inflammation in your sinuses.    Other medicines. Decongestants, antihistamines, and other nasal sprays may give short-term relief. They may help with congestion. Talk with your healthcare provider before taking these medicines.     Preventing acute rhinosinusitis  You can help prevent a sinus infection with these steps:    Wash your hands well and often.    Stay away from people who have a cold or upper respiratory infection.    Don't smoke. And stay away from secondhand smoke.    Use a humidifier at home.    Make sure you are up-to-date on your vaccines, such as the flu shot.     When to call your healthcare provider  Call your healthcare provider right away if you have any of these:    Fever of 100.4 F (38 C) or higher, or as directed by your healthcare provider    Pain that gets worse    Symptoms that don t get better, or get worse    New symptoms  Jena last reviewed this educational content on 6/1/2019 2000-2021 [employer]. All rights reserved. This information is not intended as a substitute for professional medical care. Always follow your healthcare professional's instructions.

## 2021-07-18 NOTE — PROGRESS NOTES
Patient presents with:  Urgent Care: cough, chest congestion and running stuffy nose - negative covid test result on 07/14/2021      (J01.90) Acute sinusitis, recurrence not specified, unspecified location  (primary encounter diagnosis)  Comment:   Plan: azithromycin (ZITHROMAX Z-PARTH) 250 MG tablet        Continue Flonase.  Add saline nasal spray    (Z20.822) Suspected COVID-19 virus infection  Comment:   Plan: Asymptomatic COVID-19 Virus (Coronavirus) by         PCR Nasopharyngeal            (R50.9) Fever and chills  Comment:   Plan: XR Chest 2 Views            Follow covid isolation guidelines.          SUBJECTIVE:   Sylvain Major is a 40 year old female who presents today with sinus congestion and runny nose, cough and chest discomfort with cough.    Covid vaccination complete 5/11/21          Past Medical History:   Diagnosis Date     CARDIOVASCULAR SCREENING; LDL GOAL LESS THAN 160 1/2/2013         Current Outpatient Medications   Medication Sig Dispense Refill     Multiple Vitamins-Iron (DAILY-SHANNON/IRON/BETA-CAROTENE) TABS TAKE 1 TABLET BY MOUTH DAILY. (Patient not taking: Reported on 10/19/2020) 30 tablet 7     Social History     Tobacco Use     Smoking status: Never Smoker     Smokeless tobacco: Never Used   Substance Use Topics     Alcohol use: Not on file     Family History   Problem Relation Age of Onset     Diabetes Mother      Diabetes Father          ROS:    10 point ROS of systems including Constitutional, Eyes, Respiratory, Cardiovascular, Gastroenterology, Genitourinary, Integumentary, Muscularskeletal, Psychiatric ,neurological were all negative except for pertinent positives noted in my HPI       OBJECTIVE:  /77   Pulse 97   Temp 99.3  F (37.4  C) (Tympanic)   Resp 18   SpO2 98%   Physical Exam:  GENERAL APPEARANCE: healthy, alert and no distress  EYES: EOMI,  PERRL, conjunctiva clear  HENT: ear canals and TM's normal.  Nose and mouth without ulcers, erythema or lesions  HENT:  nasal turbinates erythematous, swollen and rhinorrhea yellow  NECK: supple, nontender, no lymphadenopathy  RESP: lungs clear to auscultation - no rales, rhonchi or wheezes  CV: regular rates and rhythm, normal S1 S2, no murmur noted  ABDOMEN:  soft, nontender, no HSM or masses and bowel sounds normal  NEURO: Normal strength and tone, sensory exam grossly normal,  normal speech and mentation  SKIN: no suspicious lesions or rashes      CXR: no infiltrates or masses as per my interpretation during clinic visit

## 2021-07-19 LAB — SARS-COV-2 RNA RESP QL NAA+PROBE: NEGATIVE

## 2021-09-27 ENCOUNTER — OFFICE VISIT (OUTPATIENT)
Dept: FAMILY MEDICINE | Facility: CLINIC | Age: 41
End: 2021-09-27
Payer: COMMERCIAL

## 2021-09-27 VITALS
HEART RATE: 103 BPM | SYSTOLIC BLOOD PRESSURE: 104 MMHG | TEMPERATURE: 99 F | DIASTOLIC BLOOD PRESSURE: 76 MMHG | RESPIRATION RATE: 16 BRPM | BODY MASS INDEX: 23.04 KG/M2 | WEIGHT: 128 LBS | OXYGEN SATURATION: 99 %

## 2021-09-27 DIAGNOSIS — H66.001 ACUTE SUPPURATIVE OTITIS MEDIA OF RIGHT EAR WITHOUT SPONTANEOUS RUPTURE OF TYMPANIC MEMBRANE, RECURRENCE NOT SPECIFIED: Primary | ICD-10-CM

## 2021-09-27 PROCEDURE — 99213 OFFICE O/P EST LOW 20 MIN: CPT | Performed by: NURSE PRACTITIONER

## 2021-09-27 RX ORDER — AMOXICILLIN 875 MG
875 TABLET ORAL 2 TIMES DAILY
COMMUNITY
Start: 2021-09-20 | End: 2021-09-27

## 2021-09-27 ASSESSMENT — ANXIETY QUESTIONNAIRES
6. BECOMING EASILY ANNOYED OR IRRITABLE: SEVERAL DAYS
7. FEELING AFRAID AS IF SOMETHING AWFUL MIGHT HAPPEN: NOT AT ALL
GAD7 TOTAL SCORE: 6
7. FEELING AFRAID AS IF SOMETHING AWFUL MIGHT HAPPEN: NOT AT ALL
4. TROUBLE RELAXING: SEVERAL DAYS
2. NOT BEING ABLE TO STOP OR CONTROL WORRYING: SEVERAL DAYS
8. IF YOU CHECKED OFF ANY PROBLEMS, HOW DIFFICULT HAVE THESE MADE IT FOR YOU TO DO YOUR WORK, TAKE CARE OF THINGS AT HOME, OR GET ALONG WITH OTHER PEOPLE?: SOMEWHAT DIFFICULT
5. BEING SO RESTLESS THAT IT IS HARD TO SIT STILL: NOT AT ALL
1. FEELING NERVOUS, ANXIOUS, OR ON EDGE: MORE THAN HALF THE DAYS
GAD7 TOTAL SCORE: 6
GAD7 TOTAL SCORE: 6
3. WORRYING TOO MUCH ABOUT DIFFERENT THINGS: SEVERAL DAYS

## 2021-09-27 ASSESSMENT — PATIENT HEALTH QUESTIONNAIRE - PHQ9
SUM OF ALL RESPONSES TO PHQ QUESTIONS 1-9: 8
10. IF YOU CHECKED OFF ANY PROBLEMS, HOW DIFFICULT HAVE THESE PROBLEMS MADE IT FOR YOU TO DO YOUR WORK, TAKE CARE OF THINGS AT HOME, OR GET ALONG WITH OTHER PEOPLE: VERY DIFFICULT
SUM OF ALL RESPONSES TO PHQ QUESTIONS 1-9: 8

## 2021-09-27 ASSESSMENT — PAIN SCALES - GENERAL: PAINLEVEL: MODERATE PAIN (4)

## 2021-09-27 NOTE — PROGRESS NOTES
"    Assessment & Plan     Acute suppurative otitis media of right ear without spontaneous rupture of tympanic membrane, recurrence not specified  Comment: History and exam suggest failure of amoxicillin alone to treat suppurative AOM. Will order Augmentin for 10 days and add heat, Flonase, Sudafed, and Zyrtec. Symptomatic cares (see patient instructions) and follow up precautions discussed in detail. Discussed risks, benefits, alternatives, potential side effects, and proper administration of new medication / treatment. Agrees with plan of care. All questions answered.   - amoxicillin-clavulanate (AUGMENTIN) 875-125 MG tablet  Dispense: 20 tablet; Refill: 0        BMI:   Estimated body mass index is 23.04 kg/m  as calculated from the following:    Height as of 3/19/21: 1.588 m (5' 2.5\").    Weight as of this encounter: 58.1 kg (128 lb).       See Patient Instructions    Return in about 1 week (around 10/4/2021) for persistent or worsening symptoms.    yAan Campoverde NP  [facility]   Julia is a 41 year old who presents for the following health issues     HPI     Acute Illness  Acute illness concerns: ear pain  Onset/Duration: about 3 weeks  Symptoms:  Fever: no  Chills/Sweats: no  Headache (location?): yes  Sinus Pressure: yes right side  Conjunctivitis:  no  Ear Pain: YES: right  Rhinorrhea: no  Congestion: no  Sore Throat: no  Cough: no  Wheeze: no  Decreased Appetite: no  Nausea: no  Vomiting: no  Diarrhea: no  Dysuria/Freq.: no  Dysuria or Hematuria: no  Fatigue/Achiness: no  Sick/Strep Exposure: no  Therapies tried and outcome: pt was given an antibiotic but it is not helping    HPI: Julia presents today with the complaint of ongoing right ear pain / fullness / stuffiness and headache. One week ago, she was given amoxicillin by a tele-physician. She notes today that she is still having symptoms.     Denies fever, chills. No cough, wheeze, SOB.     Still has sinus " symptoms (headache, eye issues, etc.).     Has tried Sudafed, Flonase.     Review of Systems   Constitutional, HEENT, pulmonary systems are negative, except as otherwise noted.      Objective    /76   Pulse 103   Temp 99  F (37.2  C)   Resp 16   Wt 58.1 kg (128 lb)   SpO2 99%   BMI 23.04 kg/m    Body mass index is 23.04 kg/m .  Physical Exam   GENERAL: healthy, alert and no distress  HENT: Right ear canal normal; Right TM with erythema, bulge, and purulent fluid noted behind. Left ear canal and TM normal.   RESP: lungs clear to auscultation - no rales, rhonchi or wheezes  CV: regular rate and rhythm, normal S1 S2, no S3 or S4, no murmur, click or rub, no peripheral edema and peripheral pulses strong  NEURO: Normal strength and tone, mentation intact and speech normal  PSYCH: mentation appears normal, affect normal/bright    No results found for this or any previous visit (from the past 24 hour(s)).

## 2021-09-27 NOTE — PATIENT INSTRUCTIONS
1. Heat  2. Flonase (fluticasone)  3. Allegra or Zyrtec  4. Sudafed 12-hour  5. Augmentin twice a day for 10 day

## 2021-09-28 ASSESSMENT — ANXIETY QUESTIONNAIRES: GAD7 TOTAL SCORE: 6

## 2021-10-02 ENCOUNTER — HEALTH MAINTENANCE LETTER (OUTPATIENT)
Age: 41
End: 2021-10-02

## 2021-11-23 DIAGNOSIS — E78.5 HYPERLIPIDEMIA LDL GOAL <70: ICD-10-CM

## 2021-11-23 NOTE — LETTER
December 3, 2021      Sylvain Major  [address] [place] [address]        Dear Sylvain,       Our records indicates that it is time for you to be seen for an office visit to establish care with a new provider.    Please call our office at [phone] to schedule an appointment for establishing care.     Please disregard this notice if you have already made an appointment.    If you are no longer a [facility] patient; Please contact us and let us know that as well. You will also need to the let the pharmacy know the name of your current Provider so that they can send future request to them.       Sincerely,    [facility] Staff

## 2021-11-24 RX ORDER — ATORVASTATIN CALCIUM 20 MG/1
TABLET, FILM COATED ORAL
Qty: 90 TABLET | Refills: 0 | Status: SHIPPED | OUTPATIENT
Start: 2021-11-24 | End: 2022-03-28

## 2021-11-24 NOTE — TELEPHONE ENCOUNTER
Due for office visit, only #90 supply submitted.  No additional refills until seen in clinic.    Please call and inform pt to schedule a/an an annual exam. Former [facility] patient, needs to establish care with new PCP.

## 2021-11-24 NOTE — TELEPHONE ENCOUNTER
Routing refill request to provider for review/approval because:  Labs not current:  ANGELO WARE RN  EP Triage

## 2021-12-01 NOTE — TELEPHONE ENCOUNTER
Pt has not read [email or web address] message, Left message on voicemail for patient to call back. Dl WOLFF CMA

## 2022-03-25 DIAGNOSIS — E78.5 HYPERLIPIDEMIA LDL GOAL <70: ICD-10-CM

## 2022-03-28 RX ORDER — ATORVASTATIN CALCIUM 20 MG/1
TABLET, FILM COATED ORAL
Qty: 30 TABLET | Refills: 0 | Status: SHIPPED | OUTPATIENT
Start: 2022-03-28

## 2022-03-28 NOTE — PROVIDER NOTIFICATION
11/13/18 1825   Provider Notification   Provider Name/Title Dr. Martinez   Method of Notification In Department   Request Evaluate - Remote   Notification Reason Status Update  (Strip review)   Pt had 1x VD and 1 intermittent decel at 1740 not associate with a contraction. Per Dr. Martinez continue to monitor until 1840. If no further decel pt ok to discharge.     Diagnostic wire inserted.

## 2022-03-28 NOTE — TELEPHONE ENCOUNTER
Routing refill request to provider for review/approval because:  Labs out of range and out of date:    LDL Cholesterol Calculated   Date Value Ref Range Status   09/10/2019 170 (H) <100 mg/dL Final     Comment:     Above desirable:  100-129 mg/dl  Borderline High:  130-159 mg/dL  High:             160-189 mg/dL  Very high:       >189 mg/dl         Chaya Patino RN

## 2022-03-28 NOTE — TELEPHONE ENCOUNTER
Due for office visit, only 1 month supply submitted.     Please call and inform pt to schedule a/an an annual exam with fasting labs. [facility] patient, needs to establish care with new PCP.

## 2022-03-31 NOTE — TELEPHONE ENCOUNTER
Spoke to pt, is no longer [facility] pt, does not need a refill of this time. Has changed to [facility].Dl WOLFF CMA

## 2022-05-14 ENCOUNTER — HEALTH MAINTENANCE LETTER (OUTPATIENT)
Age: 42
End: 2022-05-14

## 2022-09-04 ENCOUNTER — HEALTH MAINTENANCE LETTER (OUTPATIENT)
Age: 42
End: 2022-09-04

## 2023-01-15 ENCOUNTER — HEALTH MAINTENANCE LETTER (OUTPATIENT)
Age: 43
End: 2023-01-15

## 2023-06-03 ENCOUNTER — HEALTH MAINTENANCE LETTER (OUTPATIENT)
Age: 43
End: 2023-06-03

## 2023-07-22 ENCOUNTER — HEALTH MAINTENANCE LETTER (OUTPATIENT)
Age: 43
End: 2023-07-22

## 2024-02-17 ENCOUNTER — HEALTH MAINTENANCE LETTER (OUTPATIENT)
Age: 44
End: 2024-02-17

## 2024-07-06 ENCOUNTER — HEALTH MAINTENANCE LETTER (OUTPATIENT)
Age: 44
End: 2024-07-06

## 2024-09-14 ENCOUNTER — HEALTH MAINTENANCE LETTER (OUTPATIENT)
Age: 44
End: 2024-09-14

## (undated) DEVICE — DRSG STERI STRIP 1/4X3" R1541

## (undated) DEVICE — BARRIER SEPRAFILM 5X6" SINGLE SHEET 4301-02

## (undated) DEVICE — CATH TRAY FOLEY 16FR SILICONE 907416

## (undated) DEVICE — SUCTION CANISTER MEDIVAC LINER 1500ML W/LID 65651-515

## (undated) DEVICE — SU VICRYL 0 CT-1 36" J346H

## (undated) DEVICE — BNDG ABDOMINAL BINDER 10X26-50" 08140145

## (undated) DEVICE — STOCKING SLEEVE COMPRESSION CALF LG

## (undated) DEVICE — SU VICRYL 3-0 CTX 36" UND J980H

## (undated) DEVICE — STRAP KNEE/BODY 31143004

## (undated) DEVICE — DRAPE SETUP C-SECTION INVISISHIELD 54X90" DYNJE5600

## (undated) DEVICE — PREP CHLORAPREP 26ML TINTED ORANGE  260815

## (undated) DEVICE — SOL ADH LIQUID BENZOIN SWAB 0.6ML C1544

## (undated) DEVICE — SOL NACL 0.9% IRRIG 1000ML BOTTLE 07138-09

## (undated) DEVICE — BASIN SET MAJOR

## (undated) DEVICE — GLOVE PROTEXIS BLUE W/NEU-THERA 7.0  2D73EB70

## (undated) DEVICE — SOL WATER IRRIG 1000ML BOTTLE 07139-09

## (undated) DEVICE — ESU GROUND PAD UNIVERSAL W/O CORD

## (undated) DEVICE — GLOVE ESTEEM POWDER FREE SMT 6.5  2D72PT65

## (undated) DEVICE — PACK C-SECTION LF PL15OTA83B

## (undated) DEVICE — SU MONOCRYL 4-0 PS-2 18" UND Y496G

## (undated) RX ORDER — ONDANSETRON 2 MG/ML
INJECTION INTRAMUSCULAR; INTRAVENOUS
Status: DISPENSED
Start: 2018-12-02

## (undated) RX ORDER — FENTANYL CITRATE 50 UG/ML
INJECTION, SOLUTION INTRAMUSCULAR; INTRAVENOUS
Status: DISPENSED
Start: 2018-12-02

## (undated) RX ORDER — HYDROMORPHONE HCL/0.9% NACL/PF 0.2MG/0.2
SYRINGE (ML) INTRAVENOUS
Status: DISPENSED
Start: 2018-12-02

## (undated) RX ORDER — KETOROLAC TROMETHAMINE 30 MG/ML
INJECTION, SOLUTION INTRAMUSCULAR; INTRAVENOUS
Status: DISPENSED
Start: 2018-12-02

## (undated) RX ORDER — PROPOFOL 10 MG/ML
INJECTION, EMULSION INTRAVENOUS
Status: DISPENSED
Start: 2018-12-02

## (undated) RX ORDER — HYDROMORPHONE HYDROCHLORIDE 1 MG/ML
INJECTION, SOLUTION INTRAMUSCULAR; INTRAVENOUS; SUBCUTANEOUS
Status: DISPENSED
Start: 2018-12-02